# Patient Record
Sex: FEMALE | Race: BLACK OR AFRICAN AMERICAN | NOT HISPANIC OR LATINO | URBAN - METROPOLITAN AREA
[De-identification: names, ages, dates, MRNs, and addresses within clinical notes are randomized per-mention and may not be internally consistent; named-entity substitution may affect disease eponyms.]

---

## 2022-09-22 ENCOUNTER — HOSPITAL ENCOUNTER (INPATIENT)
Facility: HOSPITAL | Age: 37
LOS: 8 days | Discharge: HOME | DRG: 813 | End: 2022-09-30
Attending: EMERGENCY MEDICINE | Admitting: STUDENT IN AN ORGANIZED HEALTH CARE EDUCATION/TRAINING PROGRAM
Payer: MEDICAID

## 2022-09-22 DIAGNOSIS — J18.1 MULTIFOCAL LUNG CONSOLIDATION: ICD-10-CM

## 2022-09-22 DIAGNOSIS — R06.02 SOB (SHORTNESS OF BREATH): ICD-10-CM

## 2022-09-22 DIAGNOSIS — M79.604 LEG PAIN, RIGHT: ICD-10-CM

## 2022-09-22 DIAGNOSIS — M79.604 RIGHT LEG PAIN: ICD-10-CM

## 2022-09-22 DIAGNOSIS — I50.23 ACUTE ON CHRONIC SYSTOLIC CONGESTIVE HEART FAILURE: ICD-10-CM

## 2022-09-22 DIAGNOSIS — R04.2 HEMOPTYSIS: Primary | ICD-10-CM

## 2022-09-22 DIAGNOSIS — Z91.148 NON COMPLIANCE W MEDICATION REGIMEN: ICD-10-CM

## 2022-09-22 DIAGNOSIS — E87.1 HYPONATREMIA: ICD-10-CM

## 2022-09-22 DIAGNOSIS — I50.9 CONGESTIVE HEART FAILURE, UNSPECIFIED HF CHRONICITY, UNSPECIFIED HEART FAILURE TYPE: ICD-10-CM

## 2022-09-22 DIAGNOSIS — F14.10 COCAINE ABUSE: ICD-10-CM

## 2022-09-22 DIAGNOSIS — I50.9 CHF (CONGESTIVE HEART FAILURE): ICD-10-CM

## 2022-09-22 DIAGNOSIS — Z72.0 TOBACCO USE: ICD-10-CM

## 2022-09-22 DIAGNOSIS — D64.9 ANEMIA, UNSPECIFIED TYPE: ICD-10-CM

## 2022-09-22 DIAGNOSIS — R07.9 CHEST PAIN: ICD-10-CM

## 2022-09-22 DIAGNOSIS — I82.4Y1 DEEP VEIN THROMBOSIS (DVT) OF PROXIMAL VEIN OF RIGHT LOWER EXTREMITY, UNSPECIFIED CHRONICITY: ICD-10-CM

## 2022-09-22 PROCEDURE — 99285 EMERGENCY DEPT VISIT HI MDM: CPT

## 2022-09-22 PROCEDURE — 11000001 HC ACUTE MED/SURG PRIVATE ROOM

## 2022-09-22 PROCEDURE — 96375 TX/PRO/DX INJ NEW DRUG ADDON: CPT

## 2022-09-22 PROCEDURE — 96365 THER/PROPH/DIAG IV INF INIT: CPT

## 2022-09-22 PROCEDURE — 99291 CRITICAL CARE FIRST HOUR: CPT

## 2022-09-23 ENCOUNTER — DOCUMENTATION ONLY (OUTPATIENT)
Dept: CARDIOLOGY | Facility: CLINIC | Age: 37
End: 2022-09-23
Payer: MEDICAID

## 2022-09-23 PROBLEM — F14.10 COCAINE ABUSE: Status: ACTIVE | Noted: 2022-09-23

## 2022-09-23 PROBLEM — Z91.148 NON COMPLIANCE W MEDICATION REGIMEN: Status: ACTIVE | Noted: 2022-09-23

## 2022-09-23 PROBLEM — R04.2 HEMOPTYSIS: Status: ACTIVE | Noted: 2022-09-23

## 2022-09-23 PROBLEM — D64.9 ANEMIA: Status: ACTIVE | Noted: 2022-09-23

## 2022-09-23 PROBLEM — Z72.0 TOBACCO USE: Status: ACTIVE | Noted: 2022-09-23

## 2022-09-23 PROBLEM — I82.501 CHRONIC DEEP VEIN THROMBOSIS (DVT) OF RIGHT LOWER EXTREMITY: Status: ACTIVE | Noted: 2022-09-23

## 2022-09-23 PROBLEM — R06.02 SOB (SHORTNESS OF BREATH): Status: ACTIVE | Noted: 2022-09-23

## 2022-09-23 PROBLEM — I50.23 ACUTE ON CHRONIC SYSTOLIC CONGESTIVE HEART FAILURE: Status: ACTIVE | Noted: 2022-09-23

## 2022-09-23 LAB
ABO + RH BLD: NORMAL
ALBUMIN SERPL BCP-MCNC: 2.8 G/DL (ref 3.5–5.2)
ALP SERPL-CCNC: 75 U/L (ref 55–135)
ALT SERPL W/O P-5'-P-CCNC: 17 U/L (ref 10–44)
AMPHET+METHAMPHET UR QL: NEGATIVE
ANION GAP SERPL CALC-SCNC: 13 MMOL/L (ref 8–16)
ANISOCYTOSIS BLD QL SMEAR: SLIGHT
APTT BLDCRRT: 28.3 SEC (ref 21–32)
ASCENDING AORTA: 2.54 CM
AST SERPL-CCNC: 22 U/L (ref 10–40)
AV INDEX (PROSTH): 0.35
AV MEAN GRADIENT: 1 MMHG
AV PEAK GRADIENT: 2 MMHG
AV VALVE AREA: 1.05 CM2
AV VELOCITY RATIO: 0.41
BACTERIA #/AREA URNS HPF: ABNORMAL /HPF
BARBITURATES UR QL SCN>200 NG/ML: NEGATIVE
BASOPHILS # BLD AUTO: 0.04 K/UL (ref 0–0.2)
BASOPHILS NFR BLD: 0.7 % (ref 0–1.9)
BENZODIAZ UR QL SCN>200 NG/ML: NEGATIVE
BILIRUB SERPL-MCNC: 3 MG/DL (ref 0.1–1)
BILIRUB UR QL STRIP: NEGATIVE
BLD GP AB SCN CELLS X3 SERPL QL: NORMAL
BNP SERPL-MCNC: 3610 PG/ML (ref 0–99)
BSA FOR ECHO PROCEDURE: 1.6 M2
BUN SERPL-MCNC: 16 MG/DL (ref 6–20)
BZE UR QL SCN: NEGATIVE
CALCIUM SERPL-MCNC: 8.6 MG/DL (ref 8.7–10.5)
CANNABINOIDS UR QL SCN: NEGATIVE
CHLORIDE SERPL-SCNC: 107 MMOL/L (ref 95–110)
CLARITY UR: CLEAR
CO2 SERPL-SCNC: 18 MMOL/L (ref 23–29)
COLOR UR: YELLOW
CREAT SERPL-MCNC: 0.9 MG/DL (ref 0.5–1.4)
CREAT UR-MCNC: 55.2 MG/DL (ref 15–325)
CV ECHO LV RWT: 0.45 CM
D DIMER PPP IA.FEU-MCNC: 2.65 MG/L FEU
DACRYOCYTES BLD QL SMEAR: ABNORMAL
DIFFERENTIAL METHOD: ABNORMAL
DOP CALC AO PEAK VEL: 0.78 M/S
DOP CALC AO VTI: 10.2 CM
DOP CALC LVOT AREA: 3 CM2
DOP CALC LVOT DIAMETER: 1.95 CM
DOP CALC LVOT PEAK VEL: 0.32 M/S
DOP CALC LVOT STROKE VOLUME: 10.75 CM3
DOP CALCLVOT PEAK VEL VTI: 3.6 CM
E WAVE DECELERATION TIME: 173.52 MSEC
E/A RATIO: 3.52
E/E' RATIO: 18.55 M/S
ECHO LV POSTERIOR WALL: 1.43 CM (ref 0.6–1.1)
EJECTION FRACTION: 15 %
EOSINOPHIL # BLD AUTO: 0 K/UL (ref 0–0.5)
EOSINOPHIL NFR BLD: 0.7 % (ref 0–8)
ERYTHROCYTE [DISTWIDTH] IN BLOOD BY AUTOMATED COUNT: 25.8 % (ref 11.5–14.5)
EST. GFR  (NO RACE VARIABLE): >60 ML/MIN/1.73 M^2
FERRITIN SERPL-MCNC: 95 NG/ML (ref 20–300)
FRACTIONAL SHORTENING: 6 % (ref 28–44)
GLUCOSE SERPL-MCNC: 74 MG/DL (ref 70–110)
GLUCOSE UR QL STRIP: NEGATIVE
HCG INTACT+B SERPL-ACNC: <1.2 MIU/ML
HCT VFR BLD AUTO: 25.8 % (ref 37–48.5)
HGB BLD-MCNC: 7.3 G/DL (ref 12–16)
HGB UR QL STRIP: NEGATIVE
HYALINE CASTS #/AREA URNS LPF: 26 /LPF
HYPOCHROMIA BLD QL SMEAR: ABNORMAL
IMM GRANULOCYTES # BLD AUTO: 0.03 K/UL (ref 0–0.04)
IMM GRANULOCYTES NFR BLD AUTO: 0.5 % (ref 0–0.5)
INR PPP: 1.4 (ref 0.8–1.2)
INTERVENTRICULAR SEPTUM: 1.34 CM (ref 0.6–1.1)
IRON SERPL-MCNC: 34 UG/DL (ref 30–160)
IVC DIAMETER: 2.77 CM
IVRT: 51.38 MSEC
KETONES UR QL STRIP: NEGATIVE
LA MAJOR: 6.36 CM
LA MINOR: 6.44 CM
LA WIDTH: 4.8 CM
LEFT ATRIUM SIZE: 4.46 CM
LEFT ATRIUM VOLUME INDEX: 72.8 ML/M2
LEFT ATRIUM VOLUME: 116.45 CM3
LEFT INTERNAL DIMENSION IN SYSTOLE: 6.05 CM (ref 2.1–4)
LEFT VENTRICLE DIASTOLIC VOLUME INDEX: 131.01 ML/M2
LEFT VENTRICLE DIASTOLIC VOLUME: 209.62 ML
LEFT VENTRICLE MASS INDEX: 266 G/M2
LEFT VENTRICLE SYSTOLIC VOLUME INDEX: 114.6 ML/M2
LEFT VENTRICLE SYSTOLIC VOLUME: 183.41 ML
LEFT VENTRICULAR INTERNAL DIMENSION IN DIASTOLE: 6.41 CM (ref 3.5–6)
LEFT VENTRICULAR MASS: 425.12 G
LEUKOCYTE ESTERASE UR QL STRIP: ABNORMAL
LV LATERAL E/E' RATIO: 14.57 M/S
LV SEPTAL E/E' RATIO: 25.5 M/S
LVOT MG: 0.25 MMHG
LVOT MV: 0.24 CM/S
LYMPHOCYTES # BLD AUTO: 2 K/UL (ref 1–4.8)
LYMPHOCYTES NFR BLD: 34.6 % (ref 18–48)
MAGNESIUM SERPL-MCNC: 1.8 MG/DL (ref 1.6–2.6)
MCH RBC QN AUTO: 23 PG (ref 27–31)
MCHC RBC AUTO-ENTMCNC: 28.3 G/DL (ref 32–36)
MCV RBC AUTO: 81 FL (ref 82–98)
METHADONE UR QL SCN>300 NG/ML: NEGATIVE
MICROSCOPIC COMMENT: ABNORMAL
MONOCYTES # BLD AUTO: 0.4 K/UL (ref 0.3–1)
MONOCYTES NFR BLD: 7.5 % (ref 4–15)
MV PEAK A VEL: 0.29 M/S
MV PEAK E VEL: 1.02 M/S
MV STENOSIS PRESSURE HALF TIME: 50.32 MS
MV VALVE AREA P 1/2 METHOD: 4.37 CM2
NEUTROPHILS # BLD AUTO: 3.3 K/UL (ref 1.8–7.7)
NEUTROPHILS NFR BLD: 56 % (ref 38–73)
NITRITE UR QL STRIP: NEGATIVE
NRBC BLD-RTO: 1 /100 WBC
OPIATES UR QL SCN: NEGATIVE
OVALOCYTES BLD QL SMEAR: ABNORMAL
PCP UR QL SCN>25 NG/ML: NEGATIVE
PH UR STRIP: 6 [PH] (ref 5–8)
PHOSPHATE SERPL-MCNC: 3.5 MG/DL (ref 2.7–4.5)
PISA MRMAX VEL: 4.8 M/S
PISA TR MAX VEL: 4.77 M/S
PLATELET # BLD AUTO: 227 K/UL (ref 150–450)
PLATELET BLD QL SMEAR: ABNORMAL
PMV BLD AUTO: 9.5 FL (ref 9.2–12.9)
POIKILOCYTOSIS BLD QL SMEAR: ABNORMAL
POLYCHROMASIA BLD QL SMEAR: ABNORMAL
POTASSIUM SERPL-SCNC: 4.2 MMOL/L (ref 3.5–5.1)
PROT SERPL-MCNC: 6.6 G/DL (ref 6–8.4)
PROT UR QL STRIP: NEGATIVE
PROTHROMBIN TIME: 15.1 SEC (ref 9–12.5)
RA MAJOR: 6.43 CM
RA PRESSURE: 15 MMHG
RA WIDTH: 5.22 CM
RBC # BLD AUTO: 3.17 M/UL (ref 4–5.4)
RBC #/AREA URNS HPF: 23 /HPF (ref 0–4)
SARS-COV-2 RDRP RESP QL NAA+PROBE: NEGATIVE
SATURATED IRON: 8 % (ref 20–50)
SCHISTOCYTES BLD QL SMEAR: PRESENT
SINUS: 2.66 CM
SODIUM SERPL-SCNC: 138 MMOL/L (ref 136–145)
SP GR UR STRIP: 1.01 (ref 1–1.03)
SQUAMOUS #/AREA URNS HPF: 13 /HPF
STJ: 2.47 CM
TARGETS BLD QL SMEAR: ABNORMAL
TDI LATERAL: 0.07 M/S
TDI SEPTAL: 0.04 M/S
TDI: 0.06 M/S
TOTAL IRON BINDING CAPACITY: 448 UG/DL (ref 250–450)
TOXICOLOGY INFORMATION: NORMAL
TR MAX PG: 91 MMHG
TRANSFERRIN SERPL-MCNC: 303 MG/DL (ref 200–375)
TRICUSPID ANNULAR PLANE SYSTOLIC EXCURSION: 1.1 CM
TROPONIN I SERPL DL<=0.01 NG/ML-MCNC: 0.02 NG/ML (ref 0–0.03)
TV REST PULMONARY ARTERY PRESSURE: 106 MMHG
URN SPEC COLLECT METH UR: ABNORMAL
UROBILINOGEN UR STRIP-ACNC: ABNORMAL EU/DL
WBC # BLD AUTO: 5.87 K/UL (ref 3.9–12.7)
WBC #/AREA URNS HPF: 68 /HPF (ref 0–5)

## 2022-09-23 PROCEDURE — 84702 CHORIONIC GONADOTROPIN TEST: CPT | Performed by: EMERGENCY MEDICINE

## 2022-09-23 PROCEDURE — 85379 FIBRIN DEGRADATION QUANT: CPT | Performed by: EMERGENCY MEDICINE

## 2022-09-23 PROCEDURE — 87088 URINE BACTERIA CULTURE: CPT | Performed by: EMERGENCY MEDICINE

## 2022-09-23 PROCEDURE — 36415 COLL VENOUS BLD VENIPUNCTURE: CPT | Performed by: FAMILY MEDICINE

## 2022-09-23 PROCEDURE — 25000003 PHARM REV CODE 250: Performed by: EMERGENCY MEDICINE

## 2022-09-23 PROCEDURE — 36415 COLL VENOUS BLD VENIPUNCTURE: CPT | Performed by: EMERGENCY MEDICINE

## 2022-09-23 PROCEDURE — 83036 HEMOGLOBIN GLYCOSYLATED A1C: CPT | Performed by: EMERGENCY MEDICINE

## 2022-09-23 PROCEDURE — G0378 HOSPITAL OBSERVATION PER HR: HCPCS

## 2022-09-23 PROCEDURE — 99219 PR INITIAL OBSERVATION CARE,LEVL II: ICD-10-PCS | Mod: ,,, | Performed by: PHYSICIAN ASSISTANT

## 2022-09-23 PROCEDURE — 99219 PR INITIAL OBSERVATION CARE,LEVL II: CPT | Mod: ,,, | Performed by: PHYSICIAN ASSISTANT

## 2022-09-23 PROCEDURE — 63600175 PHARM REV CODE 636 W HCPCS: Performed by: INTERNAL MEDICINE

## 2022-09-23 PROCEDURE — 83735 ASSAY OF MAGNESIUM: CPT | Performed by: EMERGENCY MEDICINE

## 2022-09-23 PROCEDURE — 11000001 HC ACUTE MED/SURG PRIVATE ROOM

## 2022-09-23 PROCEDURE — 93010 ELECTROCARDIOGRAM REPORT: CPT | Mod: ,,, | Performed by: STUDENT IN AN ORGANIZED HEALTH CARE EDUCATION/TRAINING PROGRAM

## 2022-09-23 PROCEDURE — 87086 URINE CULTURE/COLONY COUNT: CPT | Performed by: EMERGENCY MEDICINE

## 2022-09-23 PROCEDURE — 80053 COMPREHEN METABOLIC PANEL: CPT | Performed by: EMERGENCY MEDICINE

## 2022-09-23 PROCEDURE — 85025 COMPLETE CBC W/AUTO DIFF WBC: CPT | Performed by: EMERGENCY MEDICINE

## 2022-09-23 PROCEDURE — 25000003 PHARM REV CODE 250: Performed by: NURSE PRACTITIONER

## 2022-09-23 PROCEDURE — 93010 EKG 12-LEAD: ICD-10-PCS | Mod: ,,, | Performed by: STUDENT IN AN ORGANIZED HEALTH CARE EDUCATION/TRAINING PROGRAM

## 2022-09-23 PROCEDURE — 63600175 PHARM REV CODE 636 W HCPCS: Performed by: EMERGENCY MEDICINE

## 2022-09-23 PROCEDURE — 84484 ASSAY OF TROPONIN QUANT: CPT | Performed by: EMERGENCY MEDICINE

## 2022-09-23 PROCEDURE — 84466 ASSAY OF TRANSFERRIN: CPT | Performed by: FAMILY MEDICINE

## 2022-09-23 PROCEDURE — 85730 THROMBOPLASTIN TIME PARTIAL: CPT | Performed by: EMERGENCY MEDICINE

## 2022-09-23 PROCEDURE — 85610 PROTHROMBIN TIME: CPT | Performed by: EMERGENCY MEDICINE

## 2022-09-23 PROCEDURE — 86850 RBC ANTIBODY SCREEN: CPT | Performed by: EMERGENCY MEDICINE

## 2022-09-23 PROCEDURE — 25500020 PHARM REV CODE 255: Performed by: EMERGENCY MEDICINE

## 2022-09-23 PROCEDURE — U0002 COVID-19 LAB TEST NON-CDC: HCPCS | Performed by: EMERGENCY MEDICINE

## 2022-09-23 PROCEDURE — 80307 DRUG TEST PRSMV CHEM ANLYZR: CPT | Performed by: EMERGENCY MEDICINE

## 2022-09-23 PROCEDURE — 83880 ASSAY OF NATRIURETIC PEPTIDE: CPT | Performed by: EMERGENCY MEDICINE

## 2022-09-23 PROCEDURE — 81000 URINALYSIS NONAUTO W/SCOPE: CPT | Mod: 59 | Performed by: EMERGENCY MEDICINE

## 2022-09-23 PROCEDURE — 82728 ASSAY OF FERRITIN: CPT | Performed by: FAMILY MEDICINE

## 2022-09-23 PROCEDURE — 93005 ELECTROCARDIOGRAM TRACING: CPT

## 2022-09-23 PROCEDURE — 84100 ASSAY OF PHOSPHORUS: CPT | Performed by: EMERGENCY MEDICINE

## 2022-09-23 PROCEDURE — 25000003 PHARM REV CODE 250: Performed by: INTERNAL MEDICINE

## 2022-09-23 RX ORDER — IPRATROPIUM BROMIDE AND ALBUTEROL SULFATE 2.5; .5 MG/3ML; MG/3ML
3 SOLUTION RESPIRATORY (INHALATION) EVERY 4 HOURS PRN
Status: DISCONTINUED | OUTPATIENT
Start: 2022-09-23 | End: 2022-09-30 | Stop reason: HOSPADM

## 2022-09-23 RX ORDER — ASPIRIN 81 MG/1
81 TABLET ORAL DAILY
Status: ON HOLD | COMMUNITY
Start: 2022-08-12 | End: 2022-09-29 | Stop reason: HOSPADM

## 2022-09-23 RX ORDER — BENZONATATE 200 MG/1
CAPSULE ORAL
COMMUNITY
Start: 2022-09-15

## 2022-09-23 RX ORDER — METOPROLOL SUCCINATE 25 MG/1
25 TABLET, EXTENDED RELEASE ORAL DAILY
Status: DISCONTINUED | OUTPATIENT
Start: 2022-09-23 | End: 2022-09-25

## 2022-09-23 RX ORDER — POTASSIUM CHLORIDE 750 MG/1
1 TABLET, EXTENDED RELEASE ORAL DAILY
COMMUNITY
Start: 2022-08-12 | End: 2023-08-12

## 2022-09-23 RX ORDER — METOPROLOL SUCCINATE 25 MG/1
1 TABLET, EXTENDED RELEASE ORAL DAILY
Status: ON HOLD | COMMUNITY
Start: 2022-08-12 | End: 2022-09-29 | Stop reason: HOSPADM

## 2022-09-23 RX ORDER — HYDROCODONE BITARTRATE AND ACETAMINOPHEN 5; 325 MG/1; MG/1
1 TABLET ORAL
Status: COMPLETED | OUTPATIENT
Start: 2022-09-23 | End: 2022-09-23

## 2022-09-23 RX ORDER — ONDANSETRON 2 MG/ML
4 INJECTION INTRAMUSCULAR; INTRAVENOUS EVERY 8 HOURS PRN
Status: DISCONTINUED | OUTPATIENT
Start: 2022-09-23 | End: 2022-09-30 | Stop reason: HOSPADM

## 2022-09-23 RX ORDER — ATORVASTATIN CALCIUM 40 MG/1
40 TABLET, FILM COATED ORAL DAILY
COMMUNITY
Start: 2022-08-02

## 2022-09-23 RX ORDER — GUAIFENESIN 100 MG/5ML
200 SOLUTION ORAL EVERY 4 HOURS PRN
Status: DISCONTINUED | OUTPATIENT
Start: 2022-09-23 | End: 2022-09-30 | Stop reason: HOSPADM

## 2022-09-23 RX ORDER — FUROSEMIDE 10 MG/ML
40 INJECTION INTRAMUSCULAR; INTRAVENOUS
Status: COMPLETED | OUTPATIENT
Start: 2022-09-23 | End: 2022-09-23

## 2022-09-23 RX ORDER — SODIUM CHLORIDE 0.9 % (FLUSH) 0.9 %
10 SYRINGE (ML) INJECTION
Status: DISCONTINUED | OUTPATIENT
Start: 2022-09-23 | End: 2022-09-30 | Stop reason: HOSPADM

## 2022-09-23 RX ORDER — FERROUS SULFATE 324(65)MG
TABLET, DELAYED RELEASE (ENTERIC COATED) ORAL DAILY
Status: ON HOLD | COMMUNITY
Start: 2022-09-15 | End: 2022-09-30 | Stop reason: SDUPTHER

## 2022-09-23 RX ORDER — ACETAMINOPHEN 325 MG/1
650 TABLET ORAL EVERY 6 HOURS PRN
Status: DISCONTINUED | OUTPATIENT
Start: 2022-09-23 | End: 2022-09-30 | Stop reason: HOSPADM

## 2022-09-23 RX ORDER — QUETIAPINE FUMARATE 100 MG/1
100 TABLET, FILM COATED ORAL NIGHTLY
Status: ON HOLD | COMMUNITY
Start: 2022-05-18 | End: 2022-09-29 | Stop reason: SDUPTHER

## 2022-09-23 RX ORDER — MAG HYDROX/ALUMINUM HYD/SIMETH 200-200-20
30 SUSPENSION, ORAL (FINAL DOSE FORM) ORAL EVERY 6 HOURS PRN
Status: DISCONTINUED | OUTPATIENT
Start: 2022-09-23 | End: 2022-09-30 | Stop reason: HOSPADM

## 2022-09-23 RX ORDER — EMPAGLIFLOZIN 10 MG/1
10 TABLET, FILM COATED ORAL DAILY
COMMUNITY
Start: 2022-06-25

## 2022-09-23 RX ORDER — FUROSEMIDE 40 MG/1
40 TABLET ORAL DAILY
COMMUNITY
Start: 2022-08-12

## 2022-09-23 RX ORDER — CARVEDILOL 3.12 MG/1
6.25 TABLET ORAL 2 TIMES DAILY
Status: DISCONTINUED | OUTPATIENT
Start: 2022-09-23 | End: 2022-09-23

## 2022-09-23 RX ORDER — CARVEDILOL 6.25 MG/1
6.25 TABLET ORAL 2 TIMES DAILY
Status: ON HOLD | COMMUNITY
Start: 2022-06-07 | End: 2022-09-29 | Stop reason: HOSPADM

## 2022-09-23 RX ORDER — ATORVASTATIN CALCIUM 40 MG/1
40 TABLET, FILM COATED ORAL DAILY
Status: DISCONTINUED | OUTPATIENT
Start: 2022-09-23 | End: 2022-09-30 | Stop reason: HOSPADM

## 2022-09-23 RX ADMIN — ATORVASTATIN CALCIUM 40 MG: 40 TABLET, FILM COATED ORAL at 08:09

## 2022-09-23 RX ADMIN — FUROSEMIDE 40 MG: 10 INJECTION, SOLUTION INTRAMUSCULAR; INTRAVENOUS at 08:09

## 2022-09-23 RX ADMIN — FUROSEMIDE 40 MG: 10 INJECTION, SOLUTION INTRAMUSCULAR; INTRAVENOUS at 01:09

## 2022-09-23 RX ADMIN — ACETAMINOPHEN 650 MG: 325 TABLET ORAL at 11:09

## 2022-09-23 RX ADMIN — CEFTRIAXONE 1 G: 1 INJECTION, SOLUTION INTRAVENOUS at 05:09

## 2022-09-23 RX ADMIN — METOPROLOL SUCCINATE 25 MG: 25 TABLET, EXTENDED RELEASE ORAL at 11:09

## 2022-09-23 RX ADMIN — ACETAMINOPHEN 650 MG: 325 TABLET ORAL at 10:09

## 2022-09-23 RX ADMIN — HYDROCODONE BITARTRATE AND ACETAMINOPHEN 1 TABLET: 5; 325 TABLET ORAL at 02:09

## 2022-09-23 RX ADMIN — IOHEXOL 100 ML: 350 INJECTION, SOLUTION INTRAVENOUS at 03:09

## 2022-09-23 RX ADMIN — FUROSEMIDE 40 MG: 10 INJECTION, SOLUTION INTRAMUSCULAR; INTRAVENOUS at 10:09

## 2022-09-23 NOTE — HPI
"Ms. Echols is a 37 year old female patient whose current medical conditions include chronic systolic CHF (25% per echo 6/22 in Care Everywhere), history of cocaine abuse, RLE DVT s/p thrombectomy in 6/22, CHALINO, and non-compliance who presented to [facility] ED yesterday with a chief complaint of worsening SOB over the past few days. Associated symptoms included hemoptysis, BLE edema, abdominal bloating, and orthopnea. She denied any associated fever, chills, palpitations, near syncope, or syncope. Initial workup in ED revealed anemia (H/H 7.3/25.8), D-dimer +, and BNP of 3610. CTA negative for PE but did show findings consistent with vascular congestion and right heart strain and patient was subsequently admitted for further evaluation and treatment. Cardiology consulted to assist with management. Patient seen and examined today, resting in bed. Remains SOB. Denies osvaldo chest pain but does admit to some left upper chest "tingling". States she has been having issues with SOB/fluid overload ever since her thrombectomy/hospital admission in June. She claims she has been compliant with her medications, but has been using Lasix prn and taking Eliquis only once daily. Admits to eating 2 bags of potato chips prior to admission. Chart reviewed. Initial troponin negative. Repeat echo pending.    "

## 2022-09-23 NOTE — ASSESSMENT & PLAN NOTE
-Patient with known CHF, EF 25% by last echo in Care Everywhere  -Grossly overloaded on exam  -Continue IV diuresis  -Resume BB if UDS negative  -Will attempt to add ARB vs Entresto pending BP trend  -Repeat Echo pending  -Patient with history of medication non-compliance  -Counseled on dietary and fluid restrictions

## 2022-09-23 NOTE — PLAN OF CARE
Plan of care discussed with pt. Pt verbalized understanding. Free from injury. No s/s of distress noted. Vitals stable. IV SL. Meds as ordered. Pain controlled. Diet tolerated. Tele monitor in place. Q2 hour rounding. No complaints. Will continue to monitor pt. 12 hour chart review.

## 2022-09-23 NOTE — CARE UPDATE
37-year-old female admitted for hemoptysis, upon further investigation patient has blood-tinged sputum.  Iron studies pending.  Since stopping Eliquis this hospitalization she has not had any blood-tinged sputum production.  BB resumed and will further diurese.  Monitor H/ H

## 2022-09-23 NOTE — ASSESSMENT & PLAN NOTE
-s/p thrombectomy few months ago at [facility]  -Has been on Xarelto  -hold due to hemoptysis

## 2022-09-23 NOTE — PROGRESS NOTES
"[facility]) nurse navigator notified of HFTCC candidate in need of education and introduction to 4-6 week program.      PT aao x 3 while lying in bed. Introduced self to pt as HFTCC nurse navigator.     Patient given "Home Care Guide for Heart Failure Patients" , "Heart Failure [facility]" flyer and "Daily weight and symptom tracker".  Encouraged pt to review information.      Reviewed the following key points of HFTCC program with pt and family:   1.) Take your medications as directed.    2.) Weight yourself daily   3.) Follow low salt and limited fluid diet.    4.) Encouraged smoking and drug cessation   5.) Go to your appointments and call your team.      Pt reminded to follow Symptom tracker and to call at the onset of symptoms according to tracker.     Reviewed plan for follow up once discharged to include phone calls, in person and virtual visits to assist pt optimizing their heart failure medication regimen and encouraging healthy lifestyle modifications.  Reminded pt that program will assist them over the next 4-6 weeks and then patient will be transferred to long term care provider .  Reminded pt how to contact HFTCC navigator via phone and or via [employer].     Pt instructed appointment will be printed on hospital discharge paperwork for follow up appt with HENRRY Huff.    Pt also reminded HF nurse will call 48-72 hours after discharge to check on them.      Encouraged pt to read over information often and contact team with any questions or concerns.      "

## 2022-09-23 NOTE — ASSESSMENT & PLAN NOTE
-per Care Everywhere, has been snorting cocaine since age 18  -UDS pending, hold BB until resulted

## 2022-09-23 NOTE — CONSULTS
"[facility]  Cardiology  Consult Note    Patient Name: Teresa Echols  MRN: [id number]  Admission Date: 9/22/2022  Hospital Length of Stay: 0 days  Code Status: Full Code   Attending Provider: Srinivas Beard MD   Consulting Provider: Zoe Kim PA-C  Primary Care Physician: Primary Doctor No  Principal Problem:Hemoptysis    Patient information was obtained from patient, past medical records and ER records.     Inpatient consult to Cardiology  Consult performed by: Zoe Kim PA-C  Consult ordered by: Abel Franklin MD        Subjective:     Chief Complaint:  SOB    HPI:   Ms. Echols is a 37 year old female patient whose current medical conditions include chronic systolic CHF (25% per echo 6/22 in Care Everywhere), history of cocaine abuse, RLE DVT s/p thrombectomy in 6/22, CHALINO, and non-compliance who presented to [facility] ED yesterday with a chief complaint of worsening SOB over the past few days. Associated symptoms included hemoptysis, BLE edema, abdominal bloating, and orthopnea. She denied any associated fever, chills, palpitations, near syncope, or syncope. Initial workup in ED revealed anemia (H/H 7.3/25.8), D-dimer +, and BNP of 3610. CTA negative for PE but did show findings consistent with vascular congestion and right heart strain and patient was subsequently admitted for further evaluation and treatment. Cardiology consulted to assist with management. Patient seen and examined today, resting in bed. Remains SOB. Denies osvaldo chest pain but does admit to some left upper chest "tingling". States she has been having issues with SOB/fluid overload ever since her thrombectomy/hospital admission in June. She claims she has been compliant with her medications, but has been using Lasix prn and taking Eliquis only once daily. Admits to eating 2 bags of potato chips prior to admission. Chart reviewed. Initial troponin negative. Repeat echo pending.        Past Medical History:   Diagnosis Date    Bipolar " disorder, unspecified     CHF (congestive heart failure)     Hypertension        History reviewed. No pertinent surgical history.    Review of patient's allergies indicates:  No Known Allergies    No current facility-administered medications on file prior to encounter.     Current Outpatient Medications on File Prior to Encounter   Medication Sig    metoprolol succinate (TOPROL-XL) 25 MG 24 hr tablet Take 1 tablet by mouth once daily.    potassium chloride SA (K-DUR,KLOR-CON M) 10 MEQ tablet Take 1 tablet by mouth once daily.    apixaban (ELIQUIS) 5 mg Tab Take 5 mg by mouth.    aspirin (ECOTRIN) 81 MG EC tablet Take 81 mg by mouth once daily.    atorvastatin (LIPITOR) 40 MG tablet Take 40 mg by mouth once daily.    benzonatate (TESSALON) 200 MG capsule Take by mouth.    carvediloL (COREG) 6.25 MG tablet Take 6.25 mg by mouth 2 (two) times daily.    ferrous sulfate 324 mg (65 mg iron) TbEC Take by mouth once daily.    furosemide (LASIX) 40 MG tablet Take 40 mg by mouth once daily.    JARDIANCE 10 mg tablet Take 10 mg by mouth once daily.    QUEtiapine (SEROQUEL) 100 MG Tab Take 100 mg by mouth every evening.     Family History       Problem Relation (Age of Onset)    Hypertension Mother, Father          Tobacco Use    Smoking status: Every Day     Types: Cigarettes    Smokeless tobacco: Never   Substance and Sexual Activity    Alcohol use: Yes    Drug use: Yes     Types: Cocaine    Sexual activity: Not on file     Review of Systems   Constitutional: Positive for malaise/fatigue.   HENT: Negative.     Eyes: Negative.    Cardiovascular:  Positive for dyspnea on exertion, leg swelling and orthopnea.   Respiratory:  Positive for shortness of breath.    Endocrine: Negative.    Hematologic/Lymphatic: Negative.    Skin: Negative.    Musculoskeletal: Negative.    Gastrointestinal: Negative.    Genitourinary: Negative.    Neurological: Negative.    Psychiatric/Behavioral: Negative.     Allergic/Immunologic:  Negative.    Objective:     Vital Signs (Most Recent):  Temp: 97.6 °F (36.4 °C) (09/23/22 0748)  Pulse: 107 (09/23/22 0930)  Resp: (!) 22 (09/23/22 0748)  BP: 105/72 (09/23/22 0748)  SpO2: 100 % (09/23/22 0748)   Vital Signs (24h Range):  Temp:  [97.6 °F (36.4 °C)-98.8 °F (37.1 °C)] 97.6 °F (36.4 °C)  Pulse:  [] 107  Resp:  [18-22] 22  SpO2:  [100 %] 100 %  BP: (105-129)/(68-87) 105/72     Weight: 56.7 kg (125 lb)  Body mass index is 21.46 kg/m².    SpO2: 100 %  O2 Device (Oxygen Therapy): nasal cannula      Intake/Output Summary (Last 24 hours) at 9/23/2022 1044  Last data filed at 9/23/2022 0633  Gross per 24 hour   Intake 50 ml   Output --   Net 50 ml       Lines/Drains/Airways       Peripheral Intravenous Line  Duration                  Peripheral IV - Single Lumen 09/23/22 0050 20 G Right Antecubital <1 day                    Physical Exam  Vitals and nursing note reviewed.   Constitutional:       General: She is not in acute distress.     Appearance: Normal appearance. She is well-developed. She is not diaphoretic.   HENT:      Head: Normocephalic and atraumatic.   Eyes:      General:         Right eye: No discharge.         Left eye: No discharge.      Pupils: Pupils are equal, round, and reactive to light.   Neck:      Thyroid: No thyromegaly.      Vascular: No JVD.      Trachea: No tracheal deviation.      Comments: +JVD    Cardiovascular:      Rate and Rhythm: Normal rate and regular rhythm.      Heart sounds: Normal heart sounds, S1 normal and S2 normal. No murmur heard.  Pulmonary:      Effort: Pulmonary effort is normal. No respiratory distress.      Breath sounds: Rales present. No wheezing.   Abdominal:      General: There is distension.      Tenderness: There is no rebound.   Musculoskeletal:      Cervical back: Neck supple.      Right lower leg: Edema present.      Left lower leg: Edema present.   Skin:     General: Skin is warm and dry.      Findings: No erythema.   Neurological:       General: No focal deficit present.      Mental Status: She is alert and oriented to person, place, and time.   Psychiatric:         Mood and Affect: Mood normal.         Behavior: Behavior normal.         Thought Content: Thought content normal.       Significant Labs: CMP   Recent Labs   Lab 09/23/22 0053      K 4.2      CO2 18*   GLU 74   BUN 16   CREATININE 0.9   CALCIUM 8.6*   PROT 6.6   ALBUMIN 2.8*   BILITOT 3.0*   ALKPHOS 75   AST 22   ALT 17   ANIONGAP 13   , CBC   Recent Labs   Lab 09/23/22 0053   WBC 5.87   HGB 7.3*   HCT 25.8*      , INR   Recent Labs   Lab 09/23/22 0053   INR 1.4*   , Troponin   Recent Labs   Lab 09/23/22 0053   TROPONINI 0.024   , and All pertinent lab results from the last 24 hours have been reviewed.    Significant Imaging: Echocardiogram: Transthoracic echo (TTE) complete (Cupid Only):   Results for orders placed or performed during the hospital encounter of 09/22/22   Echo   Result Value Ref Range    BSA 1.6 m2    TDI SEPTAL 0.04 m/s    LV LATERAL E/E' RATIO 14.57 m/s    LV SEPTAL E/E' RATIO 25.50 m/s    LA WIDTH 4.80 cm    IVC diameter 2.77 cm    Left Ventricular Outflow Tract Mean Velocity 0.24 cm/s    Left Ventricular Outflow Tract Mean Gradient 0.25 mmHg    TDI LATERAL 0.07 m/s    LVIDd 6.41 (A) 3.5 - 6.0 cm    IVS 1.34 (A) 0.6 - 1.1 cm    Posterior Wall 1.43 (A) 0.6 - 1.1 cm    LVIDs 6.05 (A) 2.1 - 4.0 cm    FS 6 28 - 44 %    LA volume 116.45 cm3    Sinus 2.66 cm    STJ 2.47 cm    Ascending aorta 2.54 cm    LV mass 425.12 g    LA size 4.46 cm    TAPSE 1.10 cm    Left Ventricle Relative Wall Thickness 0.45 cm    AV mean gradient 1 mmHg    AV valve area 1.05 cm2    AV Velocity Ratio 0.41     AV index (prosthetic) 0.35     MV valve area p 1/2 method 4.37 cm2    E/A ratio 3.52     Mean e' 0.06 m/s    E wave deceleration time 173.52 msec    IVRT 51.38 msec    LVOT diameter 1.95 cm    LVOT area 3.0 cm2    LVOT peak rolly 0.32 m/s    LVOT peak VTI 3.60 cm    Ao  peak marcus 0.78 m/s    Ao VTI 10.2 cm    Mr max marcus 4.80 m/s    LVOT stroke volume 10.75 cm3    AV peak gradient 2 mmHg    E/E' ratio 18.55 m/s    MV Peak E Marcus 1.02 m/s    TR Max Marcus 4.77 m/s    MV stenosis pressure 1/2 time 50.32 ms    MV Peak A Marcus 0.29 m/s    LV Systolic Volume 183.41 mL    LV Systolic Volume Index 114.6 mL/m2    LV Diastolic Volume 209.62 mL    LV Diastolic Volume Index 131.01 mL/m2    LA Volume Index 72.8 mL/m2    LV Mass Index 266 g/m2    RA Major Axis 6.43 cm    Left Atrium Minor Axis 6.44 cm    Left Atrium Major Axis 6.36 cm    Triscuspid Valve Regurgitation Peak Gradient 91 mmHg    RA Width 5.22 cm   , EKG: Reviewed, and X-Ray: CXR: X-Ray Chest 1 View (CXR): No results found for this visit on 09/22/22. and X-Ray Chest PA and Lateral (CXR): No results found for this visit on 09/22/22.    Assessment and Plan:   Patient who presents with decompensated CHF/anemia/hemoptysis. Grossly overloaded on exam, continue IV diuresis. Will optimize medications as tolerated. Repeat echo pending. History of medication non-compliance, she was counseled.     * Hemoptysis  -Likely in setting of fluid overload  -Resume AC as tolerated (patient has Eliquis on home med list, however H and P mentions Xarelto, need clarification)    Anemia  -H/H 7.3/25.8  -Transfuse prn, as per hospital medicine  -Monitor closely when AC resumed    Chronic deep vein thrombosis (DVT) of right lower extremity  -s/p prior thrombectomy  -Resume AC as tolerated    Tobacco use  -Denies recent usage    Non compliance w medication regimen  -Counseled on compliance    Cocaine abuse  -Denies recent usage  -UDS pending    Acute on chronic systolic congestive heart failure  -Patient with known CHF, EF 25% by last echo in Care Everywhere  -Grossly overloaded on exam  -Continue IV diuresis  -Resume BB if UDS negative  -Will attempt to add ARB vs Entresto pending BP trend  -Repeat Echo pending  -Patient with history of medication  non-compliance  -Counseled on dietary and fluid restrictions        VTE Risk Mitigation (From admission, onward)         Ordered     Place sequential compression device  Until discontinued         09/23/22 0539     IP VTE HIGH RISK PATIENT  Once         09/23/22 0539                Thank you for your consult. I will follow-up with patient. Please contact us if you have any additional questions.    Zoe Kim PA-C  Cardiology   [facility]

## 2022-09-23 NOTE — SUBJECTIVE & OBJECTIVE
Past Medical History:   Diagnosis Date    Bipolar disorder, unspecified     CHF (congestive heart failure)     Hypertension        History reviewed. No pertinent surgical history.    Review of patient's allergies indicates:  No Known Allergies    No current facility-administered medications on file prior to encounter.     Current Outpatient Medications on File Prior to Encounter   Medication Sig    metoprolol succinate (TOPROL-XL) 25 MG 24 hr tablet Take 1 tablet by mouth once daily.    potassium chloride SA (K-DUR,KLOR-CON M) 10 MEQ tablet Take 1 tablet by mouth once daily.    apixaban (ELIQUIS) 5 mg Tab Take 5 mg by mouth.    aspirin (ECOTRIN) 81 MG EC tablet Take 81 mg by mouth once daily.    atorvastatin (LIPITOR) 40 MG tablet Take 40 mg by mouth once daily.    benzonatate (TESSALON) 200 MG capsule Take by mouth.    carvediloL (COREG) 6.25 MG tablet Take 6.25 mg by mouth 2 (two) times daily.    ferrous sulfate 324 mg (65 mg iron) TbEC Take by mouth once daily.    furosemide (LASIX) 40 MG tablet Take 40 mg by mouth once daily.    JARDIANCE 10 mg tablet Take 10 mg by mouth once daily.    QUEtiapine (SEROQUEL) 100 MG Tab Take 100 mg by mouth every evening.     Family History       Problem Relation (Age of Onset)    Hypertension Mother, Father          Tobacco Use    Smoking status: Every Day     Types: Cigarettes    Smokeless tobacco: Never   Substance and Sexual Activity    Alcohol use: Yes    Drug use: Yes     Types: Cocaine    Sexual activity: Not on file     Review of Systems   Constitutional: Positive for malaise/fatigue.   HENT: Negative.     Eyes: Negative.    Cardiovascular:  Positive for dyspnea on exertion, leg swelling and orthopnea.   Respiratory:  Positive for shortness of breath.    Endocrine: Negative.    Hematologic/Lymphatic: Negative.    Skin: Negative.    Musculoskeletal: Negative.    Gastrointestinal: Negative.    Genitourinary: Negative.    Neurological: Negative.    Psychiatric/Behavioral:  Negative.     Allergic/Immunologic: Negative.    Objective:     Vital Signs (Most Recent):  Temp: 97.6 °F (36.4 °C) (09/23/22 0748)  Pulse: 107 (09/23/22 0930)  Resp: (!) 22 (09/23/22 0748)  BP: 105/72 (09/23/22 0748)  SpO2: 100 % (09/23/22 0748)   Vital Signs (24h Range):  Temp:  [97.6 °F (36.4 °C)-98.8 °F (37.1 °C)] 97.6 °F (36.4 °C)  Pulse:  [] 107  Resp:  [18-22] 22  SpO2:  [100 %] 100 %  BP: (105-129)/(68-87) 105/72     Weight: 56.7 kg (125 lb)  Body mass index is 21.46 kg/m².    SpO2: 100 %  O2 Device (Oxygen Therapy): nasal cannula      Intake/Output Summary (Last 24 hours) at 9/23/2022 1044  Last data filed at 9/23/2022 0633  Gross per 24 hour   Intake 50 ml   Output --   Net 50 ml       Lines/Drains/Airways       Peripheral Intravenous Line  Duration                  Peripheral IV - Single Lumen 09/23/22 0050 20 G Right Antecubital <1 day                    Physical Exam  Vitals and nursing note reviewed.   Constitutional:       General: She is not in acute distress.     Appearance: Normal appearance. She is well-developed. She is not diaphoretic.   HENT:      Head: Normocephalic and atraumatic.   Eyes:      General:         Right eye: No discharge.         Left eye: No discharge.      Pupils: Pupils are equal, round, and reactive to light.   Neck:      Thyroid: No thyromegaly.      Vascular: No JVD.      Trachea: No tracheal deviation.      Comments: +JVD    Cardiovascular:      Rate and Rhythm: Normal rate and regular rhythm.      Heart sounds: Normal heart sounds, S1 normal and S2 normal. No murmur heard.  Pulmonary:      Effort: Pulmonary effort is normal. No respiratory distress.      Breath sounds: Rales present. No wheezing.   Abdominal:      General: There is distension.      Tenderness: There is no rebound.   Musculoskeletal:      Cervical back: Neck supple.      Right lower leg: Edema present.      Left lower leg: Edema present.   Skin:     General: Skin is warm and dry.      Findings: No  erythema.   Neurological:      General: No focal deficit present.      Mental Status: She is alert and oriented to person, place, and time.   Psychiatric:         Mood and Affect: Mood normal.         Behavior: Behavior normal.         Thought Content: Thought content normal.       Significant Labs: CMP   Recent Labs   Lab 09/23/22 0053      K 4.2      CO2 18*   GLU 74   BUN 16   CREATININE 0.9   CALCIUM 8.6*   PROT 6.6   ALBUMIN 2.8*   BILITOT 3.0*   ALKPHOS 75   AST 22   ALT 17   ANIONGAP 13   , CBC   Recent Labs   Lab 09/23/22  0053   WBC 5.87   HGB 7.3*   HCT 25.8*      , INR   Recent Labs   Lab 09/23/22 0053   INR 1.4*   , Troponin   Recent Labs   Lab 09/23/22 0053   TROPONINI 0.024   , and All pertinent lab results from the last 24 hours have been reviewed.    Significant Imaging: Echocardiogram: Transthoracic echo (TTE) complete (Cupid Only):   Results for orders placed or performed during the hospital encounter of 09/22/22   Echo   Result Value Ref Range    BSA 1.6 m2    TDI SEPTAL 0.04 m/s    LV LATERAL E/E' RATIO 14.57 m/s    LV SEPTAL E/E' RATIO 25.50 m/s    LA WIDTH 4.80 cm    IVC diameter 2.77 cm    Left Ventricular Outflow Tract Mean Velocity 0.24 cm/s    Left Ventricular Outflow Tract Mean Gradient 0.25 mmHg    TDI LATERAL 0.07 m/s    LVIDd 6.41 (A) 3.5 - 6.0 cm    IVS 1.34 (A) 0.6 - 1.1 cm    Posterior Wall 1.43 (A) 0.6 - 1.1 cm    LVIDs 6.05 (A) 2.1 - 4.0 cm    FS 6 28 - 44 %    LA volume 116.45 cm3    Sinus 2.66 cm    STJ 2.47 cm    Ascending aorta 2.54 cm    LV mass 425.12 g    LA size 4.46 cm    TAPSE 1.10 cm    Left Ventricle Relative Wall Thickness 0.45 cm    AV mean gradient 1 mmHg    AV valve area 1.05 cm2    AV Velocity Ratio 0.41     AV index (prosthetic) 0.35     MV valve area p 1/2 method 4.37 cm2    E/A ratio 3.52     Mean e' 0.06 m/s    E wave deceleration time 173.52 msec    IVRT 51.38 msec    LVOT diameter 1.95 cm    LVOT area 3.0 cm2    LVOT peak rolly 0.32 m/s     LVOT peak VTI 3.60 cm    Ao peak marcus 0.78 m/s    Ao VTI 10.2 cm    Mr max marcus 4.80 m/s    LVOT stroke volume 10.75 cm3    AV peak gradient 2 mmHg    E/E' ratio 18.55 m/s    MV Peak E Marcus 1.02 m/s    TR Max Marcus 4.77 m/s    MV stenosis pressure 1/2 time 50.32 ms    MV Peak A Marcus 0.29 m/s    LV Systolic Volume 183.41 mL    LV Systolic Volume Index 114.6 mL/m2    LV Diastolic Volume 209.62 mL    LV Diastolic Volume Index 131.01 mL/m2    LA Volume Index 72.8 mL/m2    LV Mass Index 266 g/m2    RA Major Axis 6.43 cm    Left Atrium Minor Axis 6.44 cm    Left Atrium Major Axis 6.36 cm    Triscuspid Valve Regurgitation Peak Gradient 91 mmHg    RA Width 5.22 cm   , EKG: Reviewed, and X-Ray: CXR: X-Ray Chest 1 View (CXR): No results found for this visit on 09/22/22. and X-Ray Chest PA and Lateral (CXR): No results found for this visit on 09/22/22.

## 2022-09-23 NOTE — ASSESSMENT & PLAN NOTE
-Likely in setting of fluid overload  -Resume AC as tolerated (patient has Eliquis on home med list, however H and P mentions Xarelto, need clarification)

## 2022-09-23 NOTE — HPI
Ms. Echols is a 37-year-old  female with PMH significant for systolic CHF (EF 25% on echo done June 2022), chronic cocaine user, right lower extremity DVT status post thrombectomy on in June), presented to [facility] ED complaining of coughing up blood the past 2-3 months, since the thrombectomy procedure.  Patient on Xarelto.  States that she has been to multiple hospitals for the same issue, was told that there is nothing abnormal and was sent home.  Denies chest pain, but complains of SOB, worsen with exertion.  Denies fever, chills.  /68.  Afebrile.  HR .  SaO2 100% on 2 L O2 N/C (not home oxygen dependent).  Laboratory workup reveals hemoglobin 7.3, MCV 81, INR 1.4.  D-dimer elevated 2.65.  CTA chest negative for PE, but reveals enlarged cardiac silhouette, right heart strain, increased vascular congestion.  BNP elevated 3610.  Troponin 0.024.  UDS pending.  Received Lasix 40 mg IV x1 in the ED.    Admitting diagnosis:  Hemoptysis.  Acute on chronic systolic CHF.  Chronic cocaine user.  History of right leg DVT, status post thrombectomy in June 2022.  On Xarelto.   25-Mar-2020 11:28

## 2022-09-23 NOTE — PLAN OF CARE
[facility] - Med Surg  Initial Discharge Assessment       Primary Care Provider: Primary Doctor No    Admission Diagnosis: CHF (congestive heart failure) [I50.9]  SOB (shortness of breath) [R06.02]  Right leg pain [M79.604]  Leg pain, right [M79.604]  Multifocal lung consolidation [J18.1]  Hemoptysis [R04.2]  Anemia, unspecified type [D64.9]  Congestive heart failure, unspecified HF chronicity, unspecified heart failure type [I50.9]    Admission Date: 9/22/2022  Expected Discharge Date:     Discharge Barriers Identified: None    Payor: MEDICAID / Plan: [place] [place] / Product Type: Managed Medicaid /     Extended Emergency Contact Information  Primary Emergency Contact: Yee Echols   [facility]  Home Phone: [phone]  Mobile Phone: [phone]  Relation: Mother    Discharge Plan A: Home with family       No Pharmacies Listed    Initial Assessment (most recent)       Adult Discharge Assessment - 09/23/22 1223          Discharge Assessment    Assessment Type Discharge Planning Assessment     Confirmed/corrected address, phone number and insurance Yes     Confirmed Demographics Correct on Facesheet     Source of Information patient     Communicated PEPITO with patient/caregiver Date not available/Unable to determine     Reason For Admission Hemoptysis     Lives With other relative(s)     Facility Arrived From: home     Do you expect to return to your current living situation? Yes     Do you have help at home or someone to help you manage your care at home? Yes     Who are your caregiver(s) and their phone number(s)? Yee Echols (Mother)     Prior to hospitilization cognitive status: Alert/Oriented     Current cognitive status: Alert/Oriented     Walking or Climbing Stairs Difficulty none     Dressing/Bathing Difficulty none     Home Accessibility wheelchair accessible     Home Layout Able to live on 1st floor     Equipment Currently Used at Home none     Readmission within 30 days? No      Patient currently being followed by outpatient case management? No     Do you currently have service(s) that help you manage your care at home? No     Do you take prescription medications? Yes     Do you have prescription coverage? Yes     Coverage Medicaid     Do you have any problems affording any of your prescribed medications? No     Is the patient taking medications as prescribed? yes     Who is going to help you get home at discharge? Yee Echols (Mother)     How do you get to doctors appointments? family or friend will provide;health plan transportation     Are you on dialysis? No     Do you take coumadin? No     Discharge Plan A Home with family     DME Needed Upon Discharge  none     Discharge Plan discussed with: Patient     Discharge Barriers Identified None                   Anticipated DC dispo: home with family   Prior Level of Function: independent, lives with Aunt   PCP:  Dr. Juan Contreras MD    Comments:  CM met with patient at bedside to introduce role and discuss discharge planning. Patient lives with her aunt who will also be help at home and can provide transport at time of discharge. CM discharge needs depends on hospital progress. CM will continue following to assist with other needs.

## 2022-09-23 NOTE — CONSULTS
For education on fluid and salt restriction    Time Spent: 5 minutes    Learners: Pt     Nutrition Education provided with handouts:  Low-Sodium Nutrition Therapy and Fluid-Restricted Diet ([email or web address].[email or web address])     Comments:  RD educated patient on low sodium diet and fluid restriction related to hospital diagnosis. Discussed the importance of limiting sodium to 2,000 mg per day and reading food labels to avoid further complications of dx. Discussed using salt free seasonings and other herbs and spices in meals to enhance flavor without additional sodium. Discussed 1500 ml fluid restriction per MD and dietary sources of fluid. RD recommended using a cup with measurements for fluids and to try to consume small sips spread throughout the day rather than a lot at one time.    All questions and concerns answered.    Provided handout with dietitian's contact information.    *Please re-consult as needed.    Thanks!  See [facility]

## 2022-09-23 NOTE — ED PROVIDER NOTES
SCRIBE #1 NOTE: I, Archie Kc, am scribing for, and in the presence of, Natacha Chicas MD. I have scribed the entire note.      History      Chief Complaint   Patient presents with    Right leg pain     Pt C/O of 10/10 right leg pain. No trauma noted, Hx DVT. Pt has chronic SOB, and she has been coughing.        Review of patient's allergies indicates:  No Known Allergies     HPI   HPI    9/23/2022, 12:18 AM   History obtained from the patient      History of Present Illness: Teresa Echols is a 37 y.o. female patient with a PMHx of CHF, DVT who presents to the Emergency Department for SOB, onset 4 months PTA. Symptoms are constant and moderate in severity. Sxs are worse with exertion. Associated sxs include bright red hemoptysis, generalized abdominal pain, and abdominal distention. Pt also reports chronic RLE pain following a RLE thrombectomy on 6/9/22. Patient denies any fever, chills, n/v/d, CP, weakness, numbness, dizziness, headache, and all other sxs at this time. Pt is currently on Eliquis, and states that she is compliant with her home medication regimen. No further complaints or concerns at this time.     Arrival mode: EMS    PCP: Juan Contreras MD       Past Medical History:  Past Medical History:   Diagnosis Date    Bipolar disorder, unspecified     CHF (congestive heart failure)     Hypertension        Past Surgical History:  History reviewed. No pertinent surgical history.      Family History:  Family History   Problem Relation Age of Onset    Hypertension Mother     Hypertension Father        Social History:  Social History     Tobacco Use    Smoking status: Every Day     Types: Cigarettes    Smokeless tobacco: Never   Substance and Sexual Activity    Alcohol use: Yes    Drug use: Yes     Types: Cocaine    Sexual activity: Not on file       ROS   Review of Systems   Constitutional:  Negative for chills and fever.   HENT:  Negative for sore throat.    Respiratory:  Positive for cough (bright red  hemoptysis) and shortness of breath.    Cardiovascular:  Negative for chest pain.   Gastrointestinal:  Positive for abdominal distention and abdominal pain (generalized). Negative for diarrhea, nausea and vomiting.   Genitourinary:  Negative for dysuria.   Musculoskeletal:  Positive for myalgias (RLE, chronic). Negative for back pain.   Skin:  Negative for rash.   Neurological:  Negative for dizziness, weakness, light-headedness, numbness and headaches.   Hematological:  Does not bruise/bleed easily.   All other systems reviewed and are negative.    Physical Exam      Initial Vitals [09/22/22 2340]   BP Pulse Resp Temp SpO2   122/68 76 20 98.8 °F (37.1 °C) 100 %      MAP       --          Physical Exam  Nursing Notes and Vital Signs Reviewed.  Constitutional: Patient is in no acute distress. Well-developed and well-nourished.  Head: Atraumatic. Normocephalic.  Eyes: PERRL. EOM intact. Conjunctivae are not pale. No scleral icterus.  ENT: Mucous membranes are moist. Oropharynx is clear and symmetric.    Neck: Supple. Full ROM. No lymphadenopathy. JVD.  Cardiovascular: Regular rate. Regular rhythm. No murmurs, rubs, or gallops. Distal pulses are 2+ and symmetric.  Pulmonary/Chest: No respiratory distress. Clear to auscultation bilaterally. No wheezing or rales.  Abdominal: Distended. There is diffuse tenderness. Hepatomegaly.   Musculoskeletal: Moves all extremities. No obvious deformities. 2+ bilateral pedal edema.  Skin: Warm and dry.  Neurological:  Alert, awake, and appropriate.  Normal speech.  No acute focal neurological deficits are appreciated.  Psychiatric: Normal affect. Good eye contact. Appropriate in content.    ED Course    Critical Care    Date/Time: 9/23/2022 5:43 AM  Performed by: Natacha Chicas MD  Authorized by: Natacha Chicas MD   Direct patient critical care time: 15 minutes  Additional history critical care time: 5 minutes  Ordering / reviewing critical care time: 10 minutes  Documentation critical  "care time: 5 minutes  Consulting other physicians critical care time: 5 minutes  Total critical care time (exclusive of procedural time) : 40 minutes  Critical care time was exclusive of separately billable procedures and treating other patients and teaching time.  Critical care was necessary to treat or prevent imminent or life-threatening deterioration of the following conditions: CHF, hemoptysis.  Critical care was time spent personally by me on the following activities: blood draw for specimens, development of treatment plan with patient or surrogate, discussions with consultants, interpretation of cardiac output measurements, evaluation of patient's response to treatment, examination of patient, obtaining history from patient or surrogate, ordering and performing treatments and interventions, ordering and review of laboratory studies, ordering and review of radiographic studies, pulse oximetry, re-evaluation of patient's condition and review of old charts.      ED Vital Signs:  Vitals:    09/23/22 0632 09/23/22 0634 09/23/22 0748 09/23/22 0930   BP: 105/80 105/80 105/72    Pulse: 106  104 107   Resp: (!) 22  (!) 22    Temp:   97.6 °F (36.4 °C)    TempSrc:   Oral    SpO2: 100%  100%    Weight:  56.7 kg (125 lb)     Height:  5' 4" (1.626 m)      09/23/22 1100 09/23/22 1120 09/23/22 1323 09/23/22 1644   BP:  98/77  95/66   Pulse: 105 104 90 97   Resp:  20  16   Temp:  97.4 °F (36.3 °C)  97.9 °F (36.6 °C)   TempSrc:  Axillary  Oral   SpO2:  100%  98%   Weight:       Height:        09/23/22 1715 09/23/22 1901 09/24/22 0134 09/24/22 0508   BP:   102/65 99/73   Pulse: 92 94 98 94   Resp:  18 16 16   Temp:   97.2 °F (36.2 °C) 97.6 °F (36.4 °C)   TempSrc:   Oral Oral   SpO2:   100% (!) 92%   Weight:       Height:        09/24/22 0802 09/24/22 1136 09/24/22 1614   BP: 98/63 92/71 103/67   Pulse: 93 92 89   Resp: 18 19 16   Temp: 97.5 °F (36.4 °C) 98.2 °F (36.8 °C) 98.5 °F (36.9 °C)   TempSrc: Oral Oral Oral   SpO2: 99% " 100% 95%   Weight:      Height:          Abnormal Lab Results:  Labs Reviewed   CBC W/ AUTO DIFFERENTIAL - Abnormal; Notable for the following components:       Result Value    RBC 3.17 (*)     Hemoglobin 7.3 (*)     Hematocrit 25.8 (*)     MCV 81 (*)     MCH 23.0 (*)     MCHC 28.3 (*)     RDW 25.8 (*)     nRBC 1 (*)     All other components within normal limits    Narrative:     Release to patient->Immediate   COMPREHENSIVE METABOLIC PANEL - Abnormal; Notable for the following components:    CO2 18 (*)     Calcium 8.6 (*)     Albumin 2.8 (*)     Total Bilirubin 3.0 (*)     All other components within normal limits    Narrative:     Release to patient->Immediate   PROTIME-INR - Abnormal; Notable for the following components:    Prothrombin Time 15.1 (*)     INR 1.4 (*)     All other components within normal limits    Narrative:     Release to patient->Immediate   B-TYPE NATRIURETIC PEPTIDE - Abnormal; Notable for the following components:    BNP 3,610 (*)     All other components within normal limits    Narrative:     Release to patient->Immediate   URINALYSIS, REFLEX TO URINE CULTURE - Abnormal; Notable for the following components:    Urobilinogen, UA 2.0-3.0 (*)     Leukocytes, UA 3+ (*)     All other components within normal limits    Narrative:     Specimen Source->Urine   D DIMER, QUANTITATIVE - Abnormal; Notable for the following components:    D-Dimer 2.65 (*)     All other components within normal limits    Narrative:     Release to patient->Immediate   URINALYSIS MICROSCOPIC - Abnormal; Notable for the following components:    RBC, UA 23 (*)     WBC, UA 68 (*)     Hyaline Casts, UA 26 (*)     All other components within normal limits    Narrative:     Specimen Source->Urine   CULTURE, URINE   APTT    Narrative:     Release to patient->Immediate   HCG, QUANTITATIVE    Narrative:     Release to patient->Immediate   MAGNESIUM    Narrative:     Release to patient->Immediate   PHOSPHORUS    Narrative:      Release to patient->Immediate   SARS-COV-2 RNA AMPLIFICATION, QUAL   TROPONIN I    Narrative:     Release to patient->Immediate   HEMOGLOBIN A1C   DRUG SCREEN PANEL, URINE EMERGENCY   TYPE & SCREEN        All Lab Results:  Results for orders placed or performed during the hospital encounter of 09/22/22   CBC auto differential   Result Value Ref Range    WBC 5.87 3.90 - 12.70 K/uL    RBC 3.17 (L) 4.00 - 5.40 M/uL    Hemoglobin 7.3 (L) 12.0 - 16.0 g/dL    Hematocrit 25.8 (L) 37.0 - 48.5 %    MCV 81 (L) 82 - 98 fL    MCH 23.0 (L) 27.0 - 31.0 pg    MCHC 28.3 (L) 32.0 - 36.0 g/dL    RDW 25.8 (H) 11.5 - 14.5 %    Platelets 227 150 - 450 K/uL    MPV 9.5 9.2 - 12.9 fL    Immature Granulocytes 0.5 0.0 - 0.5 %    Gran # (ANC) 3.3 1.8 - 7.7 K/uL    Immature Grans (Abs) 0.03 0.00 - 0.04 K/uL    Lymph # 2.0 1.0 - 4.8 K/uL    Mono # 0.4 0.3 - 1.0 K/uL    Eos # 0.0 0.0 - 0.5 K/uL    Baso # 0.04 0.00 - 0.20 K/uL    nRBC 1 (A) 0 /100 WBC    Gran % 56.0 38.0 - 73.0 %    Lymph % 34.6 18.0 - 48.0 %    Mono % 7.5 4.0 - 15.0 %    Eosinophil % 0.7 0.0 - 8.0 %    Basophil % 0.7 0.0 - 1.9 %    Platelet Estimate Appears normal     Aniso Slight     Poik Moderate     Poly Occasional     Hypo Occasional     Ovalocytes Occasional     Target Cells Occasional     Tear Drop Cells Moderate     Schistocytes Present     Differential Method Automated    Comprehensive metabolic panel   Result Value Ref Range    Sodium 138 136 - 145 mmol/L    Potassium 4.2 3.5 - 5.1 mmol/L    Chloride 107 95 - 110 mmol/L    CO2 18 (L) 23 - 29 mmol/L    Glucose 74 70 - 110 mg/dL    BUN 16 6 - 20 mg/dL    Creatinine 0.9 0.5 - 1.4 mg/dL    Calcium 8.6 (L) 8.7 - 10.5 mg/dL    Total Protein 6.6 6.0 - 8.4 g/dL    Albumin 2.8 (L) 3.5 - 5.2 g/dL    Total Bilirubin 3.0 (H) 0.1 - 1.0 mg/dL    Alkaline Phosphatase 75 55 - 135 U/L    AST 22 10 - 40 U/L    ALT 17 10 - 44 U/L    Anion Gap 13 8 - 16 mmol/L    eGFR >60 >60 mL/min/1.73 m^2   Protime-INR   Result Value Ref Range     Prothrombin Time 15.1 (H) 9.0 - 12.5 sec    INR 1.4 (H) 0.8 - 1.2   APTT   Result Value Ref Range    aPTT 28.3 21.0 - 32.0 sec   Brain natriuretic peptide   Result Value Ref Range    BNP 3,610 (H) 0 - 99 pg/mL   hCG, quantitative, pregnancy   Result Value Ref Range    HCG Quant <1.2 See Text mIU/mL   Magnesium   Result Value Ref Range    Magnesium 1.8 1.6 - 2.6 mg/dL   Phosphorus   Result Value Ref Range    Phosphorus 3.5 2.7 - 4.5 mg/dL   COVID-19 Rapid Screening   Result Value Ref Range    SARS-CoV-2 RNA, Amplification, Qual Negative Negative   Troponin I   Result Value Ref Range    Troponin I 0.024 0.000 - 0.026 ng/mL   Urinalysis, Reflex to Urine Culture Urine, Clean Catch    Specimen: Urine   Result Value Ref Range    Specimen UA Urine, Clean Catch     Color, UA Yellow Yellow, Straw, Ninfa    Appearance, UA Clear Clear    pH, UA 6.0 5.0 - 8.0    Specific Gravity, UA 1.010 1.005 - 1.030    Protein, UA Negative Negative    Glucose, UA Negative Negative    Ketones, UA Negative Negative    Bilirubin (UA) Negative Negative    Occult Blood UA Negative Negative    Nitrite, UA Negative Negative    Urobilinogen, UA 2.0-3.0 (A) <2.0 EU/dL    Leukocytes, UA 3+ (A) Negative   D dimer, quantitative   Result Value Ref Range    D-Dimer 2.65 (H) <0.50 mg/L FEU   Urinalysis Microscopic   Result Value Ref Range    RBC, UA 23 (H) 0 - 4 /hpf    WBC, UA 68 (H) 0 - 5 /hpf    Bacteria Rare None-Occ /hpf    Squam Epithel, UA 13 /hpf    Hyaline Casts, UA 26 (A) 0-1/lpf /lpf    Microscopic Comment SEE COMMENT    Drug screen panel, in-house   Result Value Ref Range    Benzodiazepines Negative Negative    Methadone metabolites Negative Negative    Cocaine (Metab.) Negative Negative    Opiate Scrn, Ur Negative Negative    Barbiturate Screen, Ur Negative Negative    Amphetamine Screen, Ur Negative Negative    THC Negative Negative    Phencyclidine Negative Negative    Creatinine, Urine 55.2 15.0 - 325.0 mg/dL    Toxicology Information SEE  COMMENT    Hemoglobin A1C   Result Value Ref Range    Hemoglobin A1C 5.0 4.0 - 5.6 %    Estimated Avg Glucose 97 68 - 131 mg/dL   Iron and TIBC   Result Value Ref Range    Iron 34 30 - 160 ug/dL    Transferrin 303 200 - 375 mg/dL    TIBC 448 250 - 450 ug/dL    Saturated Iron 8 (L) 20 - 50 %   Ferritin   Result Value Ref Range    Ferritin 95 20.0 - 300.0 ng/mL   CBC Auto Differential   Result Value Ref Range    WBC 5.50 3.90 - 12.70 K/uL    RBC 3.35 (L) 4.00 - 5.40 M/uL    Hemoglobin 7.8 (L) 12.0 - 16.0 g/dL    Hematocrit 26.8 (L) 37.0 - 48.5 %    MCV 80 (L) 82 - 98 fL    MCH 23.3 (L) 27.0 - 31.0 pg    MCHC 29.1 (L) 32.0 - 36.0 g/dL    RDW 25.2 (H) 11.5 - 14.5 %    Platelets 237 150 - 450 K/uL    MPV 9.8 9.2 - 12.9 fL    Immature Granulocytes 0.4 0.0 - 0.5 %    Gran # (ANC) 2.6 1.8 - 7.7 K/uL    Immature Grans (Abs) 0.02 0.00 - 0.04 K/uL    Lymph # 2.3 1.0 - 4.8 K/uL    Mono # 0.4 0.3 - 1.0 K/uL    Eos # 0.1 0.0 - 0.5 K/uL    Baso # 0.04 0.00 - 0.20 K/uL    nRBC 0 0 /100 WBC    Gran % 47.6 38.0 - 73.0 %    Lymph % 42.2 18.0 - 48.0 %    Mono % 6.7 4.0 - 15.0 %    Eosinophil % 2.4 0.0 - 8.0 %    Basophil % 0.7 0.0 - 1.9 %    Platelet Estimate Appears normal     Aniso Moderate     Poik Moderate     Poly Occasional     Hypo Occasional     Ovalocytes Occasional     Target Cells Occasional     Tear Drop Cells Occasional     [place] Cells Occasional     Schistocytes Present     Differential Method Automated    Magnesium   Result Value Ref Range    Magnesium 1.7 1.6 - 2.6 mg/dL   Comprehensive Metabolic Panel   Result Value Ref Range    Sodium 134 (L) 136 - 145 mmol/L    Potassium 3.9 3.5 - 5.1 mmol/L    Chloride 102 95 - 110 mmol/L    CO2 18 (L) 23 - 29 mmol/L    Glucose 91 70 - 110 mg/dL    BUN 22 (H) 6 - 20 mg/dL    Creatinine 1.0 0.5 - 1.4 mg/dL    Calcium 8.4 (L) 8.7 - 10.5 mg/dL    Total Protein 5.7 (L) 6.0 - 8.4 g/dL    Albumin 2.5 (L) 3.5 - 5.2 g/dL    Total Bilirubin 2.7 (H) 0.1 - 1.0 mg/dL    Alkaline Phosphatase 79  55 - 135 U/L    AST 19 10 - 40 U/L    ALT 17 10 - 44 U/L    Anion Gap 14 8 - 16 mmol/L    eGFR >60 >60 mL/min/1.73 m^2   Echo   Result Value Ref Range    BSA 1.6 m2    TDI SEPTAL 0.04 m/s    LV LATERAL E/E' RATIO 14.57 m/s    LV SEPTAL E/E' RATIO 25.50 m/s    LA WIDTH 4.80 cm    IVC diameter 2.77 cm    Left Ventricular Outflow Tract Mean Velocity 0.24 cm/s    Left Ventricular Outflow Tract Mean Gradient 0.25 mmHg    TDI LATERAL 0.07 m/s    LVIDd 6.41 (A) 3.5 - 6.0 cm    IVS 1.34 (A) 0.6 - 1.1 cm    Posterior Wall 1.43 (A) 0.6 - 1.1 cm    LVIDs 6.05 (A) 2.1 - 4.0 cm    FS 6 28 - 44 %    LA volume 116.45 cm3    Sinus 2.66 cm    STJ 2.47 cm    Ascending aorta 2.54 cm    LV mass 425.12 g    LA size 4.46 cm    TAPSE 1.10 cm    Left Ventricle Relative Wall Thickness 0.45 cm    AV mean gradient 1 mmHg    AV valve area 1.05 cm2    AV Velocity Ratio 0.41     AV index (prosthetic) 0.35     MV valve area p 1/2 method 4.37 cm2    E/A ratio 3.52     Mean e' 0.06 m/s    E wave deceleration time 173.52 msec    IVRT 51.38 msec    LVOT diameter 1.95 cm    LVOT area 3.0 cm2    LVOT peak marcus 0.32 m/s    LVOT peak VTI 3.60 cm    Ao peak marcus 0.78 m/s    Ao VTI 10.2 cm    Mr max marcus 4.80 m/s    LVOT stroke volume 10.75 cm3    AV peak gradient 2 mmHg    E/E' ratio 18.55 m/s    MV Peak E Marcus 1.02 m/s    TR Max Marcus 4.77 m/s    MV stenosis pressure 1/2 time 50.32 ms    MV Peak A Marcus 0.29 m/s    LV Systolic Volume 183.41 mL    LV Systolic Volume Index 114.6 mL/m2    LV Diastolic Volume 209.62 mL    LV Diastolic Volume Index 131.01 mL/m2    LA Volume Index 72.8 mL/m2    LV Mass Index 266 g/m2    RA Major Axis 6.43 cm    Left Atrium Minor Axis 6.44 cm    Left Atrium Major Axis 6.36 cm    Triscuspid Valve Regurgitation Peak Gradient 91 mmHg    RA Width 5.22 cm    Right Atrial Pressure (from IVC) 15 mmHg    EF 15 %    TV rest pulmonary artery pressure 106 mmHg   Type & Screen   Result Value Ref Range    Group & Rh O POS     Indirect Marlene NEG       Imaging Results:  Imaging Results              US Lower Extremity Veins Right (Final result)  Result time 09/23/22 07:33:26      Final result by Harsh Ng Jr., MD (09/23/22 07:33:26)                   Impression:      No evidence of deep venous thrombosis within the right lower extremity.      Electronically signed by: Harsh Ng Jr., MD  Date:    09/23/2022  Time:    07:33               Narrative:    EXAMINATION:  US LOWER EXTREMITY VEINS RIGHT    CLINICAL HISTORY:  Pain in right leg    TECHNIQUE:  Duplex and color flow Doppler evaluation and graded compression of the right lower extremity veins was performed.    COMPARISON:  None    FINDINGS:  Right thigh veins: The common femoral, femoral, popliteal, upper greater saphenous, and deep femoral veins are patent and free of thrombus. The veins are normally compressible and have normal phasic flow and augmentation response.    Right calf veins: The visualized calf veins are patent.    Contralateral CFV: The contralateral (left) common femoral vein is patent and free of thrombus.    Miscellaneous: High pulsatility waveforms throughout the lower extremity as may occur with cardiac dysfunction.                                       CTA Chest Non-Coronary (PE Studies) (Final result)  Result time 09/23/22 06:53:15      Final result by Clarence Alicea MD (09/23/22 06:53:15)                   Impression:      No evidence of pulmonary embolism.  NO EVIDENCE OF RIGHT HEART STRAIN.  Reflux into the IVC and dilated on the right atrium suggest right heart dysfunction.  Recommend cardiac echo follow-up.    Cardiomegaly.  Bilateral lower lobe consolidative processes concerning for edema versus multifocal airspace disease or aspiration. Septal thickening seen within the lungs bilaterally reflecting interstitial edema or interstitial pneumonia.    See additional findings above    All CT scans at this facility use dose modulation, iterative reconstruction and/or weight  based dosing when appropriate to reduce radiation dose to as low as reasonably achievable.      Electronically signed by: Clarence Alicea MD  Date:    09/23/2022  Time:    06:53               Narrative:    EXAMINATION:  CTA CHEST NON CORONARY (PE STUDIES)    CLINICAL HISTORY:  Chest pain and shortness of breath    TECHNIQUE:  After the intravenous administration of 100 cc of Omni 350 nonionic contrast using CT pulmonary angio technique, 1.25  Mm axial images were acquired using helical CT technique from the lung apices through costophrenic sulci.  Sagittal coronal and oblique MIPS were also submitted for interpretation.    COMPARISON:  Chest x-ray dated 09/23/2022    FINDINGS:  -Pulmonary arteries: Pulmonary arteries are well opacified.  No evidence of pulmonary embolism.  No evidence of pulmonary hypertension.  No right heart strain is identified with RV/LV ratio < 0.9.    -Lungs: Bilateral lower lobe consolidative processes concerning for edema versus multifocal airspace disease or aspiration.  Septal thickening seen within the lungs bilaterally reflecting interstitial edema or interstitial pneumonia.    -Pleura: Trace right pleural effusion.    -Mediastinum/Rosalinda:Shotty adenopathy    -Axilla: No adenopathy.    -Thyroid: Normal lower gland.    -Heart/Aorta: Heart size is enlarged.  Reflux of contrast into the IVC and hepatic veins suggest right heart dysfunction.  No significant coronary artery disease.  Trace pericardial effusion. Aorta normal caliber.   Aorta demonstrates no significant atherosclerotic disease.    -Bones/Chest Wall: Intact    -Upper Abdomen: Hepatomegaly.                                       X-Ray Chest AP Portable (Final result)  Result time 09/23/22 07:36:25      Final result by Clarence Alicea MD (09/23/22 07:36:25)                   Impression:      Cardiomegaly and basilar interstitial edema suspected.      Electronically signed by: Clarence Alicea MD  Date:    09/23/2022  Time:    07:36                Narrative:    EXAMINATION:  XR CHEST AP PORTABLE    CLINICAL HISTORY:  Shortness of breath    FINDINGS:  Single view of the chest.    CT same date of service.    Cardiomegaly and basilar interstitial edema or infiltrates suspected.  Trace bilateral pleural effusions.  No pneumothorax.  Bones appear intact.                                       RADIOLOGY REPORT (Final result)  Result time 09/24/22 14:09:01      Final result by Unknown User (09/24/22 14:09:01)                                       4:43 AM: Per STAT radiology, pt's CTA Chest results:     No definitive evidence of PE.  Moderate cardiomegaly with evidence of right heart strain. Small pericardial effusion, suboptimally evaluated.  Mild pulmonary interstitial edema.  Consolidations involving both lower lobes. Small right pleural effusion.    The EKG was ordered, reviewed, and independently interpreted by the ED provider.  Interpretation time: 00:22  Rate: 113 BPM  Rhythm: sinus tachycardia  Interpretation: LVH with QRS widening and repolarization abnormality. Possible lateral infarct, age undetermined. No STEMI.           The Emergency Provider reviewed the vital signs and test results, which are outlined above.    ED Discussion     5:12 AM: Discussed case with Elda Olivo NP ([facility]). Dr. Beard agrees with current care and management of pt and accepts admission.   Admitting Service: Hospital Medicine  Admitting Physician: Dr. Beard  Admit to: Obs Tele    5:13 AM: Re-evaluated pt. I have discussed test results, shared treatment plan, and the need for admission with patient and family at bedside. Pt and family express understanding at this time and agree with all information. All questions answered. Pt and family have no further questions or concerns at this time. Pt is ready for admit.         ED Medication(s):  Medications   atorvastatin tablet 40 mg (40 mg Oral Given 9/24/22 0922)   acetaminophen tablet 650 mg (650 mg Oral Given 9/23/22  2212)   ondansetron injection 4 mg (has no administration in time range)   guaiFENesin 100 mg/5 ml syrup 200 mg (has no administration in time range)   aluminum-magnesium hydroxide-simethicone 200-200-20 mg/5 mL suspension 30 mL (has no administration in time range)   albuterol-ipratropium 2.5 mg-0.5 mg/3 mL nebulizer solution 3 mL (has no administration in time range)   sodium chloride 0.9% flush 10 mL (has no administration in time range)   cefTRIAXone (ROCEPHIN) 1 g/50 mL D5W IVPB (0 g Intravenous Stopped 9/24/22 0552)   metoprolol succinate (TOPROL-XL) 24 hr tablet 25 mg (0 mg Oral Hold 9/24/22 0900)   furosemide injection 40 mg (40 mg Intravenous Given 9/23/22 0153)   HYDROcodone-acetaminophen 5-325 mg per tablet 1 tablet (1 tablet Oral Given 9/23/22 0209)   iohexoL (OMNIPAQUE 350) injection 100 mL (100 mLs Intravenous Given 9/23/22 0312)   cefTRIAXone (ROCEPHIN) 1 g/50 mL D5W IVPB (0 g Intravenous Stopped 9/23/22 0633)   furosemide injection 40 mg (40 mg Intravenous Given 9/23/22 2208)   furosemide injection 20 mg (20 mg Intravenous Given 9/24/22 1220)         Current Discharge Medication List            Medical Decision Making    Medical Decision Making:   Clinical Tests:   Lab Tests: Ordered and Reviewed  Radiological Study: Ordered and Reviewed  Medical Tests: Ordered and Reviewed         Scribe Attestation:   Scribe #1: I performed the above scribed service and the documentation accurately describes the services I performed. I attest to the accuracy of the note.    Attending:   Physician Attestation Statement for Scribe #1: I, Natacha Chicas MD, personally performed the services described in this documentation, as scribed by Archie Kc, in my presence, and it is both accurate and complete.          Clinical Impression       ICD-10-CM ICD-9-CM   1. Hemoptysis  R04.2 786.30   2. SOB (shortness of breath)  R06.02 786.05   3. CHF (congestive heart failure)  I50.9 428.0   4. Congestive heart failure, unspecified  HF chronicity, unspecified heart failure type  I50.9 428.0   5. Multifocal lung consolidation  J18.1 481   6. Anemia, unspecified type  D64.9 285.9   7. Right leg pain  M79.604 729.5   8. Leg pain, right  M79.604 729.5   9. Acute on chronic systolic congestive heart failure  I50.23 428.23     428.0   10. Cocaine abuse  F14.10 305.60   11. Non compliance w medication regimen  Z91.14 V15.81   12. Tobacco use  Z72.0 305.1       Disposition:   Disposition: Placed in Observation  Condition: Fair       Natacha Chicas MD  09/24/22 2819

## 2022-09-23 NOTE — ASSESSMENT & PLAN NOTE
-EF 25% on echo done in June 2022 at [facility]  -repeat echo here  -Lasix 40 mg IV BID x 2 doses, re-evaluate  -CHF pathway

## 2022-09-23 NOTE — SUBJECTIVE & OBJECTIVE
Past Medical History:   Diagnosis Date    Bipolar disorder, unspecified     CHF (congestive heart failure)     Hypertension        History reviewed. No pertinent surgical history.    Review of patient's allergies indicates:  No Known Allergies    No current facility-administered medications on file prior to encounter.     Current Outpatient Medications on File Prior to Encounter   Medication Sig    apixaban (ELIQUIS) 5 mg Tab Take 5 mg by mouth.    metoprolol succinate (TOPROL-XL) 25 MG 24 hr tablet Take 1 tablet by mouth once daily.    potassium chloride SA (K-DUR,KLOR-CON M) 10 MEQ tablet Take 1 tablet by mouth once daily.    aspirin (ECOTRIN) 81 MG EC tablet Take 81 mg by mouth once daily.    atorvastatin (LIPITOR) 40 MG tablet Take 40 mg by mouth once daily.    benzonatate (TESSALON) 200 MG capsule Take by mouth.    carvediloL (COREG) 6.25 MG tablet Take 6.25 mg by mouth 2 (two) times daily.    ferrous sulfate 324 mg (65 mg iron) TbEC Take by mouth once daily.    furosemide (LASIX) 40 MG tablet Take 40 mg by mouth once daily.    JARDIANCE 10 mg tablet Take 10 mg by mouth once daily.    QUEtiapine (SEROQUEL) 100 MG Tab Take 100 mg by mouth every evening.     Family History       Problem Relation (Age of Onset)    Hypertension Mother, Father          Tobacco Use    Smoking status: Every Day     Types: Cigarettes    Smokeless tobacco: Never   Substance and Sexual Activity    Alcohol use: Yes    Drug use: Yes     Types: Cocaine    Sexual activity: Not on file     Review of Systems   Constitutional:  Positive for fatigue. Negative for chills and fever.   HENT: Negative.  Negative for congestion, rhinorrhea, sore throat and trouble swallowing.    Eyes: Negative.    Respiratory:  Positive for cough (blood) and shortness of breath. Negative for wheezing.    Cardiovascular:  Positive for leg swelling. Negative for chest pain and palpitations.   Gastrointestinal: Negative.  Negative for abdominal pain, diarrhea, nausea  and vomiting.   Endocrine: Negative.    Genitourinary: Negative.  Negative for dysuria and flank pain.   Musculoskeletal: Negative.  Negative for back pain.   Skin: Negative.  Negative for rash.   Allergic/Immunologic: Negative.    Neurological: Negative.  Negative for speech difficulty, weakness, numbness and headaches.   Hematological: Negative.    Psychiatric/Behavioral: Negative.  Negative for hallucinations. The patient is not nervous/anxious.    All other systems reviewed and are negative.  Objective:     Vital Signs (Most Recent):  Temp: 98.8 °F (37.1 °C) (09/22/22 2340)  Pulse: (!) 115 (09/22/22 2355)  Resp: 18 (09/23/22 0209)  BP: 129/87 (09/22/22 2355)  SpO2: 100 % (09/22/22 2355)   Vital Signs (24h Range):  Temp:  [98.8 °F (37.1 °C)] 98.8 °F (37.1 °C)  Pulse:  [] 115  Resp:  [18-20] 18  SpO2:  [100 %] 100 %  BP: (122-129)/(68-87) 129/87        There is no height or weight on file to calculate BMI.    Physical Exam  Vitals and nursing note reviewed.   Constitutional:       General: She is awake. She is not in acute distress.     Appearance: She is not ill-appearing.      Interventions: Nasal cannula in place.      Comments: Currently on 2 L O2 N/C   HENT:      Head: Normocephalic and atraumatic.      Mouth/Throat:      Mouth: Mucous membranes are moist.   Eyes:      General: No scleral icterus.     Conjunctiva/sclera: Conjunctivae normal.   Cardiovascular:      Rate and Rhythm: Normal rate and regular rhythm.      Heart sounds: No murmur heard.  Pulmonary:      Effort: Pulmonary effort is normal. No respiratory distress.      Breath sounds: Rales present. No wheezing.   Abdominal:      Palpations: Abdomen is soft.      Tenderness: There is no abdominal tenderness.   Musculoskeletal:         General: Swelling (Trace edema bilateral ankles) present. Normal range of motion.      Cervical back: Normal range of motion and neck supple.   Skin:     General: Skin is warm.   Neurological:      General: No  focal deficit present.      Mental Status: She is alert and oriented to person, place, and time. Mental status is at baseline.   Psychiatric:         Attention and Perception: Attention normal.         Mood and Affect: Affect is flat.         Speech: Speech normal.         Behavior: Behavior is cooperative.           Significant Labs: All pertinent labs within the past 24 hours have been reviewed.  BMP:   Recent Labs   Lab 09/23/22 0053   GLU 74      K 4.2      CO2 18*   BUN 16   CREATININE 0.9   CALCIUM 8.6*   MG 1.8     CBC:   Recent Labs   Lab 09/23/22 0053   WBC 5.87   HGB 7.3*   HCT 25.8*        CMP:   Recent Labs   Lab 09/23/22 0053      K 4.2      CO2 18*   GLU 74   BUN 16   CREATININE 0.9   CALCIUM 8.6*   PROT 6.6   ALBUMIN 2.8*   BILITOT 3.0*   ALKPHOS 75   AST 22   ALT 17   ANIONGAP 13     Troponin:   Recent Labs   Lab 09/23/22 0053   TROPONINI 0.024       Significant Imaging: I have reviewed all pertinent imaging results/findings within the past 24 hours.  I have reviewed and interpreted all pertinent imaging results/findings within the past 24 hours.    Imaging Results              CTA Chest Non-Coronary (PE Studies) (In process)                      X-Ray Chest AP Portable (In process)                   Per STAT radiology, pt's CTA Chest results:      No definitive evidence of PE.  Moderate cardiomegaly with evidence of right heart strain. Small pericardial effusion, suboptimally evaluated.  Mild pulmonary interstitial edema.  Consolidations involving both lower lobes. Small right pleural effusion    I have independently reviewed and interpreted the EKG.     I have independently reviewed all pertinent labs within the past 24 hours.    I have independently reviewed, visualized and interpreted all pertinent imaging results within the past 24 hours and discussed the findings with the ED physician, Dr. Zee

## 2022-09-24 PROBLEM — R31.9 URINARY TRACT INFECTION WITH HEMATURIA: Status: ACTIVE | Noted: 2022-09-24

## 2022-09-24 PROBLEM — N39.0 URINARY TRACT INFECTION WITH HEMATURIA: Status: ACTIVE | Noted: 2022-09-24

## 2022-09-24 LAB
ALBUMIN SERPL BCP-MCNC: 2.5 G/DL (ref 3.5–5.2)
ALP SERPL-CCNC: 79 U/L (ref 55–135)
ALT SERPL W/O P-5'-P-CCNC: 17 U/L (ref 10–44)
ANION GAP SERPL CALC-SCNC: 14 MMOL/L (ref 8–16)
ANISOCYTOSIS BLD QL SMEAR: ABNORMAL
AST SERPL-CCNC: 19 U/L (ref 10–40)
BASOPHILS # BLD AUTO: 0.04 K/UL (ref 0–0.2)
BASOPHILS NFR BLD: 0.7 % (ref 0–1.9)
BILIRUB SERPL-MCNC: 2.7 MG/DL (ref 0.1–1)
BUN SERPL-MCNC: 22 MG/DL (ref 6–20)
BURR CELLS BLD QL SMEAR: ABNORMAL
CALCIUM SERPL-MCNC: 8.4 MG/DL (ref 8.7–10.5)
CHLORIDE SERPL-SCNC: 102 MMOL/L (ref 95–110)
CO2 SERPL-SCNC: 18 MMOL/L (ref 23–29)
CREAT SERPL-MCNC: 1 MG/DL (ref 0.5–1.4)
DACRYOCYTES BLD QL SMEAR: ABNORMAL
DIFFERENTIAL METHOD: ABNORMAL
EOSINOPHIL # BLD AUTO: 0.1 K/UL (ref 0–0.5)
EOSINOPHIL NFR BLD: 2.4 % (ref 0–8)
ERYTHROCYTE [DISTWIDTH] IN BLOOD BY AUTOMATED COUNT: 25.2 % (ref 11.5–14.5)
EST. GFR  (NO RACE VARIABLE): >60 ML/MIN/1.73 M^2
ESTIMATED AVG GLUCOSE: 97 MG/DL (ref 68–131)
GLUCOSE SERPL-MCNC: 91 MG/DL (ref 70–110)
HBA1C MFR BLD: 5 % (ref 4–5.6)
HCT VFR BLD AUTO: 26.8 % (ref 37–48.5)
HGB BLD-MCNC: 7.8 G/DL (ref 12–16)
HYPOCHROMIA BLD QL SMEAR: ABNORMAL
IMM GRANULOCYTES # BLD AUTO: 0.02 K/UL (ref 0–0.04)
IMM GRANULOCYTES NFR BLD AUTO: 0.4 % (ref 0–0.5)
LYMPHOCYTES # BLD AUTO: 2.3 K/UL (ref 1–4.8)
LYMPHOCYTES NFR BLD: 42.2 % (ref 18–48)
MAGNESIUM SERPL-MCNC: 1.7 MG/DL (ref 1.6–2.6)
MCH RBC QN AUTO: 23.3 PG (ref 27–31)
MCHC RBC AUTO-ENTMCNC: 29.1 G/DL (ref 32–36)
MCV RBC AUTO: 80 FL (ref 82–98)
MONOCYTES # BLD AUTO: 0.4 K/UL (ref 0.3–1)
MONOCYTES NFR BLD: 6.7 % (ref 4–15)
NEUTROPHILS # BLD AUTO: 2.6 K/UL (ref 1.8–7.7)
NEUTROPHILS NFR BLD: 47.6 % (ref 38–73)
NRBC BLD-RTO: 0 /100 WBC
OVALOCYTES BLD QL SMEAR: ABNORMAL
PLATELET # BLD AUTO: 237 K/UL (ref 150–450)
PLATELET BLD QL SMEAR: ABNORMAL
PMV BLD AUTO: 9.8 FL (ref 9.2–12.9)
POIKILOCYTOSIS BLD QL SMEAR: ABNORMAL
POLYCHROMASIA BLD QL SMEAR: ABNORMAL
POTASSIUM SERPL-SCNC: 3.9 MMOL/L (ref 3.5–5.1)
PROT SERPL-MCNC: 5.7 G/DL (ref 6–8.4)
RBC # BLD AUTO: 3.35 M/UL (ref 4–5.4)
SCHISTOCYTES BLD QL SMEAR: PRESENT
SODIUM SERPL-SCNC: 134 MMOL/L (ref 136–145)
TARGETS BLD QL SMEAR: ABNORMAL
WBC # BLD AUTO: 5.5 K/UL (ref 3.9–12.7)

## 2022-09-24 PROCEDURE — 63600175 PHARM REV CODE 636 W HCPCS: Performed by: STUDENT IN AN ORGANIZED HEALTH CARE EDUCATION/TRAINING PROGRAM

## 2022-09-24 PROCEDURE — 99233 PR SUBSEQUENT HOSPITAL CARE,LEVL III: ICD-10-PCS | Mod: ,,, | Performed by: STUDENT IN AN ORGANIZED HEALTH CARE EDUCATION/TRAINING PROGRAM

## 2022-09-24 PROCEDURE — 36415 COLL VENOUS BLD VENIPUNCTURE: CPT | Performed by: INTERNAL MEDICINE

## 2022-09-24 PROCEDURE — 83735 ASSAY OF MAGNESIUM: CPT | Performed by: INTERNAL MEDICINE

## 2022-09-24 PROCEDURE — 99233 SBSQ HOSP IP/OBS HIGH 50: CPT | Mod: ,,, | Performed by: STUDENT IN AN ORGANIZED HEALTH CARE EDUCATION/TRAINING PROGRAM

## 2022-09-24 PROCEDURE — 25000003 PHARM REV CODE 250: Performed by: NURSE PRACTITIONER

## 2022-09-24 PROCEDURE — 85025 COMPLETE CBC W/AUTO DIFF WBC: CPT | Performed by: INTERNAL MEDICINE

## 2022-09-24 PROCEDURE — 63600175 PHARM REV CODE 636 W HCPCS: Performed by: NURSE PRACTITIONER

## 2022-09-24 PROCEDURE — G0378 HOSPITAL OBSERVATION PER HR: HCPCS

## 2022-09-24 PROCEDURE — 80053 COMPREHEN METABOLIC PANEL: CPT | Performed by: INTERNAL MEDICINE

## 2022-09-24 PROCEDURE — 25000003 PHARM REV CODE 250: Performed by: INTERNAL MEDICINE

## 2022-09-24 PROCEDURE — 11000001 HC ACUTE MED/SURG PRIVATE ROOM

## 2022-09-24 RX ORDER — FUROSEMIDE 10 MG/ML
20 INJECTION INTRAMUSCULAR; INTRAVENOUS ONCE
Status: COMPLETED | OUTPATIENT
Start: 2022-09-24 | End: 2022-09-24

## 2022-09-24 RX ORDER — FUROSEMIDE 10 MG/ML
40 INJECTION INTRAMUSCULAR; INTRAVENOUS
Status: DISCONTINUED | OUTPATIENT
Start: 2022-09-24 | End: 2022-09-30 | Stop reason: HOSPADM

## 2022-09-24 RX ORDER — FUROSEMIDE 10 MG/ML
40 INJECTION INTRAMUSCULAR; INTRAVENOUS ONCE
Status: DISCONTINUED | OUTPATIENT
Start: 2022-09-24 | End: 2022-09-24

## 2022-09-24 RX ORDER — FUROSEMIDE 10 MG/ML
40 INJECTION INTRAMUSCULAR; INTRAVENOUS
Status: DISCONTINUED | OUTPATIENT
Start: 2022-09-24 | End: 2022-09-24

## 2022-09-24 RX ORDER — BUTALBITAL, ACETAMINOPHEN AND CAFFEINE 50; 325; 40 MG/1; MG/1; MG/1
1 TABLET ORAL ONCE
Status: COMPLETED | OUTPATIENT
Start: 2022-09-24 | End: 2022-09-24

## 2022-09-24 RX ADMIN — BUTALBITAL, ACETAMINOPHEN AND CAFFEINE 1 TABLET: 50; 325; 40 TABLET ORAL at 08:09

## 2022-09-24 RX ADMIN — ATORVASTATIN CALCIUM 40 MG: 40 TABLET, FILM COATED ORAL at 09:09

## 2022-09-24 RX ADMIN — FUROSEMIDE 40 MG: 10 INJECTION, SOLUTION INTRAVENOUS at 09:09

## 2022-09-24 RX ADMIN — FUROSEMIDE 20 MG: 10 INJECTION, SOLUTION INTRAMUSCULAR; INTRAVENOUS at 12:09

## 2022-09-24 RX ADMIN — ACETAMINOPHEN 650 MG: 325 TABLET ORAL at 07:09

## 2022-09-24 RX ADMIN — CEFTRIAXONE 1 G: 1 INJECTION, SOLUTION INTRAVENOUS at 05:09

## 2022-09-24 NOTE — HOSPITAL COURSE
9/23: 37-year-old female admitted for hemoptysis, upon further investigation patient has blood-tinged sputum not vomitus.  Iron studies pending.  Since stopping Eliquis this hospitalization she has not had any blood-tinged sputum production.  BB resumed and will further diurese.  Monitor H/ H    9/24: Iron studies c/w with anemia, although Hgb improved. Continues to cough up blood tinged sputum which is likely related to CHF and pulm edema. R/LHC Monday per Cards. Diurese and monitor strict I&Os. CT chest showed concern for PNA vs edema but no signs of lesion. No leukocytosis, afebrile. Cont treating UTI.    9/25: Episode of chest pain this am. Trops negative. Cont IV diuresis with Lasix. Plan for R/LHC tomorrow. Cards following. Hgb stable and improved to 7.9 despite patient coughing up bloody sputum and as stated above likely from right heart failure and pulm edema.    9/26: Cont IV diuresis and once volume status improved plan for MPI stress test per Cards. Hgb improving. Procal negative but CRP elevated treating for PNA empirically. Cont holding Eliquis and send sputum to micro.     9/27: Cont IV diuresis and optimize for MPI stress test. Will add midodrine to augment BP because patient definitely needs IV diuresis. Cont abx. Hemoptysis slowly improving.     9/28: Will need an IVC filter because unable to tolerate PO AC. Scheduled to be placed today per IR. Hgb remains stable. CXR this am showed no significant change--per pulm possible Bronch? Cards following.     9/29: Initiated on low dose Eliquis per Pulm recs. IVC filter in place. Needs Lasix low dose per Cards with close follow up. Discharge on PO Levaquinx5 days.  Pulm and cards referrals sent. If stable d/c in the am     9/30 Patient given Iv venofer for low iron and dose of sq Vit B12. Patient's overall condition remained stable and improved and she was discharged to home once Life vest obtained.

## 2022-09-24 NOTE — PROGRESS NOTES
[facility] Medicine  Progress Note    Patient Name: Teresa Echols  MRN: [id number]  Patient Class: OP- Observation   Admission Date: 9/22/2022  Length of Stay: 0 days  Attending Physician: Irvin Escalante MD  Primary Care Provider: Juan Contreras MD        Subjective:     Principal Problem:Hemoptysis        HPI:  Ms. Echols is a 37-year-old  female with PMH significant for systolic CHF (EF 25% on echo done June 2022), chronic cocaine user, right lower extremity DVT status post thrombectomy on in June), presented to [facility] ED complaining of coughing up blood the past 2-3 months, since the thrombectomy procedure.  Patient on Xarelto.  States that she has been to multiple hospitals for the same issue, was told that there is nothing abnormal and was sent home.  Denies chest pain, but complains of SOB, worsen with exertion.  Denies fever, chills.  /68.  Afebrile.  HR .  SaO2 100% on 2 L O2 N/C (not home oxygen dependent).  Laboratory workup reveals hemoglobin 7.3, MCV 81, INR 1.4.  D-dimer elevated 2.65.  CTA chest negative for PE, but reveals enlarged cardiac silhouette, right heart strain, increased vascular congestion.  BNP elevated 3610.  Troponin 0.024.  UDS pending.  Received Lasix 40 mg IV x1 in the ED.    Admitting diagnosis:  Hemoptysis.  Acute on chronic systolic CHF.  Chronic cocaine user.  History of right leg DVT, status post thrombectomy in June 2022.  On Xarelto.      Overview/Hospital Course:  9/23: 37-year-old female admitted for hemoptysis, upon further investigation patient has blood-tinged sputum not vomitus.  Iron studies pending.  Since stopping Eliquis this hospitalization she has not had any blood-tinged sputum production.  BB resumed and will further diurese.  Monitor H/ H    9/24: Iron studies c/w with anemia, although Hgb improved. Continues to cough up blood tinged sputum which is likely related to CHF and pulm edema. R/C  Monday per Cards. Diurese and monitor strict I&Os. CT chest showed concern for PNA vs edema but no signs of lesion. No leukocytosis, afebrile. Cont treating UTI.      Interval History: violet studies c/w with anemia, although Hgb improved. Continues to cough up blood tinged sputum which is likely related to CHF and pulm edema. R/LHC Monday per Cards. Diurese and monitor strict I&Os. CT chest showed concern for PNA vs edema but no signs of lesion. No leukocytosis, afebrile. Cont treating UTI.    Review of Systems   Constitutional:  Positive for fatigue. Negative for chills and fever.   HENT: Negative.  Negative for congestion, rhinorrhea, sore throat and trouble swallowing.    Eyes: Negative.    Respiratory:  Positive for cough (blood) and shortness of breath. Negative for wheezing.    Cardiovascular:  Negative for chest pain, palpitations and leg swelling.   Gastrointestinal: Negative.  Negative for abdominal pain, diarrhea, nausea and vomiting.   Endocrine: Negative.    Genitourinary: Negative.  Negative for dysuria and flank pain.   Musculoskeletal: Negative.  Negative for back pain.   Skin: Negative.  Negative for rash.   Allergic/Immunologic: Negative.    Neurological: Negative.  Negative for speech difficulty, weakness, numbness and headaches.   Hematological: Negative.    Psychiatric/Behavioral: Negative.  Negative for hallucinations. The patient is not nervous/anxious.    All other systems reviewed and are negative.  Objective:     Vital Signs (Most Recent):  Temp: 98.2 °F (36.8 °C) (09/24/22 1136)  Pulse: 92 (09/24/22 1136)  Resp: 19 (09/24/22 1136)  BP: 92/71 (09/24/22 1136)  SpO2: 100 % (09/24/22 1136) Vital Signs (24h Range):  Temp:  [97.2 °F (36.2 °C)-98.2 °F (36.8 °C)] 98.2 °F (36.8 °C)  Pulse:  [92-98] 92  Resp:  [16-19] 19  SpO2:  [92 %-100 %] 100 %  BP: ()/(63-73) 92/71     Weight: 56.7 kg (125 lb)  Body mass index is 21.46 kg/m².    Intake/Output Summary (Last 24 hours) at 9/24/2022 1332  Last  data filed at 9/24/2022 1200  Gross per 24 hour   Intake 300 ml   Output 200 ml   Net 100 ml      Physical Exam  Vitals and nursing note reviewed.   Constitutional:       General: She is awake. She is not in acute distress.     Appearance: She is not ill-appearing.      Interventions: Nasal cannula in place.      Comments: Currently on 2 L O2 N/C   HENT:      Head: Normocephalic and atraumatic.      Mouth/Throat:      Mouth: Mucous membranes are moist.   Eyes:      General: No scleral icterus.     Conjunctiva/sclera: Conjunctivae normal.   Cardiovascular:      Rate and Rhythm: Normal rate and regular rhythm.      Heart sounds: No murmur heard.  Pulmonary:      Effort: Pulmonary effort is normal. No respiratory distress.      Breath sounds: No wheezing, rhonchi or rales.      Comments: Blood tinged sputum  Abdominal:      Palpations: Abdomen is soft.      Tenderness: There is no abdominal tenderness.   Musculoskeletal:         General: No swelling (Trace edema bilateral ankles). Normal range of motion.      Cervical back: Normal range of motion and neck supple.   Skin:     General: Skin is warm.   Neurological:      General: No focal deficit present.      Mental Status: She is alert and oriented to person, place, and time. Mental status is at baseline.   Psychiatric:         Attention and Perception: Attention normal.         Mood and Affect: Affect is flat.         Speech: Speech normal.         Behavior: Behavior is cooperative.       Significant Labs: All pertinent labs within the past 24 hours have been reviewed.    Significant Imaging: I have reviewed all pertinent imaging results/findings within the past 24 hours.      Assessment/Plan:      * Hemoptysis  -holding Eliquis  -BP stable  -CTA chest negative for PE or masses    9/24:  Cont holding Eliquis  Differentials include CHF-Pulm edema vs GI issue. CTA chest negative for masses  R/LHC on Monday per Cards  Hgb remains stable    Urinary tract infection with  hematuria  UA c/w UTI  IV Rocephin   Urine Cx pending    SOB (shortness of breath)  Resolved       Anemia  Iron studies c/w anemia  Does not warrant transfusion currently      Chronic deep vein thrombosis (DVT) of right lower extremity  -s/p thrombectomy few months ago at [facility]  -Has been on Xarelto  -hold due to hemoptysis    9/24:  Eliquis on hold  CTA negative for PE  US RLE negative for DVT        Tobacco use    Assistance with smoking cessation was offered, including:  []  Medications  [x]  Counseling  []  Printed Information on Smoking Cessation  []  Referral to a Smoking Cessation Program    Patient was counseled regarding smoking for 3-10 minutes.        Non compliance w medication regimen         Cocaine abuse  -per Care Everywhere, has been snorting cocaine since age 18  -UDS pending, hold BB until resulted    9/24:  UDS negative  BB resumed     Acute on chronic systolic congestive heart failure  -EF 25% on echo done in June 2022 at [facility]  -repeat echo here  -Lasix 40 mg IV BID x 2 doses, re-evaluate  -CHF pathway    9/24:  Patient is identified as having Combined Systolic and Diastolic heart failure that is Acute on chronic. CHF is currently uncontrolled due to Rales/crackles on pulmonary exam and Pulmonary edema/pleural effusion on CXR. Latest ECHO performed and demonstrates- Results for orders placed during the hospital encounter of 09/22/22    Echo    Interpretation Summary  · The left ventricle is severely enlarged with concentric hypertrophy and severely decreased systolic function.  · The estimated ejection fraction is 15%.  · Severe left atrial enlargement.  · Severe right atrial enlargement.  · Grade III left ventricular diastolic dysfunction.  · The estimated PA systolic pressure is 106 mmHg.  · Normal right ventricular size with mildly reduced right ventricular systolic function.  · There is pulmonary hypertension.  · Elevated central venous pressure (15 mmHg).  · Moderate to severe tricuspid  regurgitation.  · There is severe left ventricular global hypokinesis.  · Moderate pulmonic regurgitation.  · Moderate mitral regurgitation.  · There is a small right pleural effusion.  . Continue Beta Blocker and Furosemide and monitor clinical status closely. Monitor on telemetry. Patient is on CHF pathway.  Monitor strict Is&Os and daily weights.  Place on fluid restriction of 1.5 L. Continue to stress to patient importance of self efficacy and  on diet for CHF. Last BNP reviewed- and noted below   Recent Labs   Lab 09/23/22  0053   BNP 3,610*     Lasix IV  Strict I&Os  Daily weights  1500ml fluid restriction  2gm Na diet  Cards consult   Plan for R/LHC on Monday          VTE Risk Mitigation (From admission, onward)         Ordered     Place sequential compression device  Until discontinued         09/23/22 0539     IP VTE HIGH RISK PATIENT  Once         09/23/22 0539                Discharge Planning   PEPITO:      Code Status: Full Code   Is the patient medically ready for discharge?:     Reason for patient still in hospital (select all that apply): Patient unstable, Treatment and Consult recommendations  Discharge Plan A: Home with family                  Luna Escalante NP  Department of Hospital Medicine   [facility] - Med Surg

## 2022-09-24 NOTE — ASSESSMENT & PLAN NOTE
-per Care Everywhere, has been snorting cocaine since age 18  -UDS pending, hold BB until resulted    9/24:  UDS negative  BB resumed

## 2022-09-24 NOTE — ASSESSMENT & PLAN NOTE
-holding Eliquis  -BP stable  -CTA chest negative for PE or masses    9/24:  Cont holding Eliquis  Differentials include CHF-Pulm edema vs GI issue. CTA chest negative for masses  R/LHC on Monday per Cards  Hgb remains stable

## 2022-09-24 NOTE — ASSESSMENT & PLAN NOTE
Assistance with smoking cessation was offered, including:  []  Medications  [x]  Counseling  []  Printed Information on Smoking Cessation  []  Referral to a Smoking Cessation Program    Patient was counseled regarding smoking for 3-10 minutes.

## 2022-09-24 NOTE — ASSESSMENT & PLAN NOTE
-s/p thrombectomy few months ago at [facility]  -Has been on Xarelto  -hold due to hemoptysis    9/24:  Eliquis on hold  CTA negative for PE  US RLE negative for DVT

## 2022-09-24 NOTE — ASSESSMENT & PLAN NOTE
-EF 25% on [facility] in [facility]  -repeat echo here  -Lasix 40 mg IV BID x 2 doses, re-evaluate  -CHF pathway    9/24:  Patient is identified as having Combined Systolic and Diastolic heart failure that is Acute on chronic. CHF is currently uncontrolled due to Rales/crackles on pulmonary exam and Pulmonary edema/pleural effusion on CXR. Latest ECHO performed and demonstrates- Results for orders placed during the hospital encounter of 09/22/22    Echo    Interpretation Summary  · The left ventricle is severely enlarged with concentric hypertrophy and severely decreased systolic function.  · The estimated ejection fraction is 15%.  · Severe left atrial enlargement.  · Severe right atrial enlargement.  · Grade III left ventricular diastolic dysfunction.  · The estimated PA systolic pressure is 106 mmHg.  · Normal right ventricular size with mildly reduced right ventricular systolic function.  · There is pulmonary hypertension.  · Elevated central venous pressure (15 mmHg).  · Moderate to severe tricuspid regurgitation.  · There is severe left ventricular global hypokinesis.  · Moderate pulmonic regurgitation.  · Moderate mitral regurgitation.  · There is a small right pleural effusion.  . Continue Beta Blocker and Furosemide and monitor clinical status closely. Monitor on telemetry. Patient is on CHF pathway.  Monitor strict Is&Os and daily weights.  Place on fluid restriction of 1.5 L. Continue to stress to patient importance of self efficacy and  on diet for CHF. Last BNP reviewed- and noted below   Recent Labs   Lab 09/23/22  0053   BNP 3,610*     Lasix IV  Strict I&Os  Daily weights  1500ml fluid restriction  2gm Na diet  Cards consult   Plan for R/LHC on Monday

## 2022-09-24 NOTE — PLAN OF CARE
BP soft otherwise VSS. Fall precautions maintained.   No pain complaints.  NSR on cardiac monitor.  Still coughing out bloody mucus.  Lasix given 1x -voided.  Ambulates to BR.  1.5fluid restriction.  All needs met. Will continue to monitor.

## 2022-09-24 NOTE — SUBJECTIVE & OBJECTIVE
Interval History: violet studies c/w with anemia, although Hgb improved. Continues to cough up blood tinged sputum which is likely related to CHF and pulm edema. R/LHC Monday per Cards. Diurese and monitor strict I&Os. CT chest showed concern for PNA vs edema but no signs of lesion. No leukocytosis, afebrile. Cont treating UTI.    Review of Systems   Constitutional:  Positive for fatigue. Negative for chills and fever.   HENT: Negative.  Negative for congestion, rhinorrhea, sore throat and trouble swallowing.    Eyes: Negative.    Respiratory:  Positive for cough (blood) and shortness of breath. Negative for wheezing.    Cardiovascular:  Negative for chest pain, palpitations and leg swelling.   Gastrointestinal: Negative.  Negative for abdominal pain, diarrhea, nausea and vomiting.   Endocrine: Negative.    Genitourinary: Negative.  Negative for dysuria and flank pain.   Musculoskeletal: Negative.  Negative for back pain.   Skin: Negative.  Negative for rash.   Allergic/Immunologic: Negative.    Neurological: Negative.  Negative for speech difficulty, weakness, numbness and headaches.   Hematological: Negative.    Psychiatric/Behavioral: Negative.  Negative for hallucinations. The patient is not nervous/anxious.    All other systems reviewed and are negative.  Objective:     Vital Signs (Most Recent):  Temp: 98.2 °F (36.8 °C) (09/24/22 1136)  Pulse: 92 (09/24/22 1136)  Resp: 19 (09/24/22 1136)  BP: 92/71 (09/24/22 1136)  SpO2: 100 % (09/24/22 1136) Vital Signs (24h Range):  Temp:  [97.2 °F (36.2 °C)-98.2 °F (36.8 °C)] 98.2 °F (36.8 °C)  Pulse:  [92-98] 92  Resp:  [16-19] 19  SpO2:  [92 %-100 %] 100 %  BP: ()/(63-73) 92/71     Weight: 56.7 kg (125 lb)  Body mass index is 21.46 kg/m².    Intake/Output Summary (Last 24 hours) at 9/24/2022 1332  Last data filed at 9/24/2022 1200  Gross per 24 hour   Intake 300 ml   Output 200 ml   Net 100 ml      Physical Exam  Vitals and nursing note reviewed.   Constitutional:        General: She is awake. She is not in acute distress.     Appearance: She is not ill-appearing.      Interventions: Nasal cannula in place.      Comments: Currently on 2 L O2 N/C   HENT:      Head: Normocephalic and atraumatic.      Mouth/Throat:      Mouth: Mucous membranes are moist.   Eyes:      General: No scleral icterus.     Conjunctiva/sclera: Conjunctivae normal.   Cardiovascular:      Rate and Rhythm: Normal rate and regular rhythm.      Heart sounds: No murmur heard.  Pulmonary:      Effort: Pulmonary effort is normal. No respiratory distress.      Breath sounds: No wheezing, rhonchi or rales.      Comments: Blood tinged sputum  Abdominal:      Palpations: Abdomen is soft.      Tenderness: There is no abdominal tenderness.   Musculoskeletal:         General: No swelling (Trace edema bilateral ankles). Normal range of motion.      Cervical back: Normal range of motion and neck supple.   Skin:     General: Skin is warm.   Neurological:      General: No focal deficit present.      Mental Status: She is alert and oriented to person, place, and time. Mental status is at baseline.   Psychiatric:         Attention and Perception: Attention normal.         Mood and Affect: Affect is flat.         Speech: Speech normal.         Behavior: Behavior is cooperative.       Significant Labs: All pertinent labs within the past 24 hours have been reviewed.    Significant Imaging: I have reviewed all pertinent imaging results/findings within the past 24 hours.

## 2022-09-25 PROBLEM — I74.3 ARTERIAL EMBOLISM AND THROMBOSIS OF LOWER EXTREMITY: Status: ACTIVE | Noted: 2022-09-25

## 2022-09-25 PROBLEM — I82.501 CHRONIC DEEP VEIN THROMBOSIS (DVT) OF RIGHT LOWER EXTREMITY: Status: RESOLVED | Noted: 2022-09-23 | Resolved: 2022-09-25

## 2022-09-25 LAB
ACANTHOCYTES BLD QL SMEAR: PRESENT
ALBUMIN SERPL BCP-MCNC: 2.4 G/DL (ref 3.5–5.2)
ALP SERPL-CCNC: 117 U/L (ref 55–135)
ALT SERPL W/O P-5'-P-CCNC: 13 U/L (ref 10–44)
ANION GAP SERPL CALC-SCNC: 10 MMOL/L (ref 8–16)
ANISOCYTOSIS BLD QL SMEAR: ABNORMAL
AST SERPL-CCNC: 18 U/L (ref 10–40)
BACTERIA UR CULT: ABNORMAL
BASOPHILS # BLD AUTO: 0.04 K/UL (ref 0–0.2)
BASOPHILS NFR BLD: 0.7 % (ref 0–1.9)
BILIRUB SERPL-MCNC: 1.9 MG/DL (ref 0.1–1)
BUN SERPL-MCNC: 16 MG/DL (ref 6–20)
BURR CELLS BLD QL SMEAR: ABNORMAL
CALCIUM SERPL-MCNC: 8.1 MG/DL (ref 8.7–10.5)
CHLORIDE SERPL-SCNC: 105 MMOL/L (ref 95–110)
CO2 SERPL-SCNC: 21 MMOL/L (ref 23–29)
CREAT SERPL-MCNC: 0.8 MG/DL (ref 0.5–1.4)
CRP SERPL-MCNC: 25 MG/L (ref 0–8.2)
DACRYOCYTES BLD QL SMEAR: ABNORMAL
DIFFERENTIAL METHOD: ABNORMAL
EOSINOPHIL # BLD AUTO: 0.2 K/UL (ref 0–0.5)
EOSINOPHIL NFR BLD: 2.8 % (ref 0–8)
ERYTHROCYTE [DISTWIDTH] IN BLOOD BY AUTOMATED COUNT: 25.5 % (ref 11.5–14.5)
EST. GFR  (NO RACE VARIABLE): >60 ML/MIN/1.73 M^2
GIANT PLATELETS BLD QL SMEAR: PRESENT
GLUCOSE SERPL-MCNC: 115 MG/DL (ref 70–110)
HCT VFR BLD AUTO: 27.7 % (ref 37–48.5)
HGB BLD-MCNC: 7.9 G/DL (ref 12–16)
HYPOCHROMIA BLD QL SMEAR: ABNORMAL
IMM GRANULOCYTES # BLD AUTO: 0.03 K/UL (ref 0–0.04)
IMM GRANULOCYTES NFR BLD AUTO: 0.5 % (ref 0–0.5)
LACTATE SERPL-SCNC: 2.2 MMOL/L (ref 0.5–2.2)
LYMPHOCYTES # BLD AUTO: 1.5 K/UL (ref 1–4.8)
LYMPHOCYTES NFR BLD: 26.2 % (ref 18–48)
MCH RBC QN AUTO: 22.4 PG (ref 27–31)
MCHC RBC AUTO-ENTMCNC: 28.5 G/DL (ref 32–36)
MCV RBC AUTO: 79 FL (ref 82–98)
MONOCYTES # BLD AUTO: 0.4 K/UL (ref 0.3–1)
MONOCYTES NFR BLD: 6.6 % (ref 4–15)
NEUTROPHILS # BLD AUTO: 3.6 K/UL (ref 1.8–7.7)
NEUTROPHILS NFR BLD: 63.2 % (ref 38–73)
NRBC BLD-RTO: 0 /100 WBC
OVALOCYTES BLD QL SMEAR: ABNORMAL
PLATELET # BLD AUTO: 226 K/UL (ref 150–450)
PLATELET BLD QL SMEAR: ABNORMAL
PMV BLD AUTO: 9.8 FL (ref 9.2–12.9)
POIKILOCYTOSIS BLD QL SMEAR: ABNORMAL
POLYCHROMASIA BLD QL SMEAR: ABNORMAL
POTASSIUM SERPL-SCNC: 3.5 MMOL/L (ref 3.5–5.1)
PROCALCITONIN SERPL IA-MCNC: 0.16 NG/ML
PROT SERPL-MCNC: 5.7 G/DL (ref 6–8.4)
RBC # BLD AUTO: 3.52 M/UL (ref 4–5.4)
SCHISTOCYTES BLD QL SMEAR: PRESENT
SODIUM SERPL-SCNC: 136 MMOL/L (ref 136–145)
TARGETS BLD QL SMEAR: ABNORMAL
TROPONIN I SERPL DL<=0.01 NG/ML-MCNC: 0.02 NG/ML (ref 0–0.03)
WBC # BLD AUTO: 5.62 K/UL (ref 3.9–12.7)

## 2022-09-25 PROCEDURE — 25000003 PHARM REV CODE 250: Performed by: NURSE PRACTITIONER

## 2022-09-25 PROCEDURE — 93010 EKG 12-LEAD: ICD-10-PCS | Mod: ,,, | Performed by: STUDENT IN AN ORGANIZED HEALTH CARE EDUCATION/TRAINING PROGRAM

## 2022-09-25 PROCEDURE — 25000003 PHARM REV CODE 250: Performed by: STUDENT IN AN ORGANIZED HEALTH CARE EDUCATION/TRAINING PROGRAM

## 2022-09-25 PROCEDURE — 63600175 PHARM REV CODE 636 W HCPCS: Performed by: NURSE PRACTITIONER

## 2022-09-25 PROCEDURE — 99223 1ST HOSP IP/OBS HIGH 75: CPT | Mod: ,,, | Performed by: INTERNAL MEDICINE

## 2022-09-25 PROCEDURE — 93010 ELECTROCARDIOGRAM REPORT: CPT | Mod: ,,, | Performed by: STUDENT IN AN ORGANIZED HEALTH CARE EDUCATION/TRAINING PROGRAM

## 2022-09-25 PROCEDURE — 87040 BLOOD CULTURE FOR BACTERIA: CPT | Mod: 59 | Performed by: INTERNAL MEDICINE

## 2022-09-25 PROCEDURE — 11000001 HC ACUTE MED/SURG PRIVATE ROOM

## 2022-09-25 PROCEDURE — 99233 SBSQ HOSP IP/OBS HIGH 50: CPT | Mod: 25,,, | Performed by: STUDENT IN AN ORGANIZED HEALTH CARE EDUCATION/TRAINING PROGRAM

## 2022-09-25 PROCEDURE — 86140 C-REACTIVE PROTEIN: CPT | Performed by: NURSE PRACTITIONER

## 2022-09-25 PROCEDURE — 83605 ASSAY OF LACTIC ACID: CPT | Performed by: STUDENT IN AN ORGANIZED HEALTH CARE EDUCATION/TRAINING PROGRAM

## 2022-09-25 PROCEDURE — 84145 PROCALCITONIN (PCT): CPT | Performed by: NURSE PRACTITIONER

## 2022-09-25 PROCEDURE — 63600175 PHARM REV CODE 636 W HCPCS: Performed by: STUDENT IN AN ORGANIZED HEALTH CARE EDUCATION/TRAINING PROGRAM

## 2022-09-25 PROCEDURE — 36415 COLL VENOUS BLD VENIPUNCTURE: CPT | Performed by: STUDENT IN AN ORGANIZED HEALTH CARE EDUCATION/TRAINING PROGRAM

## 2022-09-25 PROCEDURE — 87070 CULTURE OTHR SPECIMN AEROBIC: CPT | Performed by: NURSE PRACTITIONER

## 2022-09-25 PROCEDURE — 85025 COMPLETE CBC W/AUTO DIFF WBC: CPT | Performed by: NURSE PRACTITIONER

## 2022-09-25 PROCEDURE — 36415 COLL VENOUS BLD VENIPUNCTURE: CPT | Performed by: NURSE PRACTITIONER

## 2022-09-25 PROCEDURE — 99233 PR SUBSEQUENT HOSPITAL CARE,LEVL III: ICD-10-PCS | Mod: 25,,, | Performed by: STUDENT IN AN ORGANIZED HEALTH CARE EDUCATION/TRAINING PROGRAM

## 2022-09-25 PROCEDURE — 25000003 PHARM REV CODE 250: Performed by: INTERNAL MEDICINE

## 2022-09-25 PROCEDURE — 87205 SMEAR GRAM STAIN: CPT | Performed by: NURSE PRACTITIONER

## 2022-09-25 PROCEDURE — 80053 COMPREHEN METABOLIC PANEL: CPT | Performed by: STUDENT IN AN ORGANIZED HEALTH CARE EDUCATION/TRAINING PROGRAM

## 2022-09-25 PROCEDURE — 93005 ELECTROCARDIOGRAM TRACING: CPT

## 2022-09-25 PROCEDURE — 84484 ASSAY OF TROPONIN QUANT: CPT | Mod: 91 | Performed by: NURSE PRACTITIONER

## 2022-09-25 PROCEDURE — 99223 PR INITIAL HOSPITAL CARE,LEVL III: ICD-10-PCS | Mod: ,,, | Performed by: INTERNAL MEDICINE

## 2022-09-25 PROCEDURE — 21400001 HC TELEMETRY ROOM

## 2022-09-25 RX ORDER — AZITHROMYCIN 250 MG/1
500 TABLET, FILM COATED ORAL DAILY
Status: DISCONTINUED | OUTPATIENT
Start: 2022-09-25 | End: 2022-09-25

## 2022-09-25 RX ORDER — DOXYCYCLINE HYCLATE 100 MG
100 TABLET ORAL EVERY 12 HOURS
Status: DISCONTINUED | OUTPATIENT
Start: 2022-09-25 | End: 2022-09-30 | Stop reason: HOSPADM

## 2022-09-25 RX ORDER — NITROGLYCERIN 0.4 MG/1
0.4 TABLET SUBLINGUAL EVERY 5 MIN PRN
Status: DISCONTINUED | OUTPATIENT
Start: 2022-09-25 | End: 2022-09-30 | Stop reason: HOSPADM

## 2022-09-25 RX ORDER — MORPHINE SULFATE 4 MG/ML
1 INJECTION, SOLUTION INTRAMUSCULAR; INTRAVENOUS EVERY 6 HOURS PRN
Status: DISCONTINUED | OUTPATIENT
Start: 2022-09-25 | End: 2022-09-25

## 2022-09-25 RX ORDER — HYDROCODONE BITARTRATE AND ACETAMINOPHEN 5; 325 MG/1; MG/1
1 TABLET ORAL EVERY 6 HOURS PRN
Status: DISCONTINUED | OUTPATIENT
Start: 2022-09-25 | End: 2022-09-30 | Stop reason: HOSPADM

## 2022-09-25 RX ORDER — METOLAZONE 2.5 MG/1
2.5 TABLET ORAL ONCE
Status: COMPLETED | OUTPATIENT
Start: 2022-09-25 | End: 2022-09-25

## 2022-09-25 RX ORDER — HEPARIN SODIUM,PORCINE/D5W 25000/250
0-40 INTRAVENOUS SOLUTION INTRAVENOUS CONTINUOUS
Status: CANCELLED | OUTPATIENT
Start: 2022-09-25

## 2022-09-25 RX ORDER — POTASSIUM CHLORIDE 20 MEQ/1
40 TABLET, EXTENDED RELEASE ORAL ONCE
Status: COMPLETED | OUTPATIENT
Start: 2022-09-25 | End: 2022-09-25

## 2022-09-25 RX ADMIN — POTASSIUM CHLORIDE 40 MEQ: 1500 TABLET, EXTENDED RELEASE ORAL at 02:09

## 2022-09-25 RX ADMIN — DOXYCYCLINE HYCLATE 100 MG: 100 TABLET, COATED ORAL at 04:09

## 2022-09-25 RX ADMIN — HYDROCODONE BITARTRATE AND ACETAMINOPHEN 1 TABLET: 5; 325 TABLET ORAL at 02:09

## 2022-09-25 RX ADMIN — METOLAZONE 2.5 MG: 2.5 TABLET ORAL at 09:09

## 2022-09-25 RX ADMIN — MORPHINE SULFATE 1 MG: 4 INJECTION INTRAVENOUS at 09:09

## 2022-09-25 RX ADMIN — FUROSEMIDE 40 MG: 10 INJECTION, SOLUTION INTRAVENOUS at 09:09

## 2022-09-25 RX ADMIN — ATORVASTATIN CALCIUM 40 MG: 40 TABLET, FILM COATED ORAL at 09:09

## 2022-09-25 RX ADMIN — DOXYCYCLINE HYCLATE 100 MG: 100 TABLET, COATED ORAL at 09:09

## 2022-09-25 RX ADMIN — CEFTRIAXONE 1 G: 1 INJECTION, SOLUTION INTRAVENOUS at 05:09

## 2022-09-25 NOTE — HPI
37-year-old female patient with past medical history of systolic heart failure due to cocaine abuse, right lower extremity arterial thrombosis status post embolectomy June 2022 [facility], admitted to the hospital for hemoptysis worsening over the last 2 3 months.

## 2022-09-25 NOTE — H&P (VIEW-ONLY)
[facility] S[facility]  Pulmonology  Consult Note    Patient Name: Teresa Echols  MRN: [id number]  Admission Date: 9/22/2022  Hospital Length of Stay: 0 days  Code Status: Full Code  Attending Physician: Srinivas Beard MD  Primary Care Provider: Juan Contreras MD   Principal Problem: Hemoptysis    [unfilled]  Subjective:     HPI:  37-year-old female patient with past medical history of systolic heart failure due to cocaine abuse, right lower extremity arterial thrombosis status post embolectomy June 2022 [facility], admitted to the hospital for hemoptysis worsening over the last 2 3 months.      Past Medical History:   Diagnosis Date    Bipolar disorder, unspecified     CHF (congestive heart failure)     Hypertension        History reviewed. No pertinent surgical history.    Review of patient's allergies indicates:  No Known Allergies    Family History       Problem Relation (Age of Onset)    Hypertension Mother, Father          Tobacco Use    Smoking status: Every Day     Types: Cigarettes    Smokeless tobacco: Never   Substance and Sexual Activity    Alcohol use: Yes    Drug use: Yes     Types: Cocaine    Sexual activity: Not on file         Review of Systems   Constitutional:  Positive for activity change. Negative for chills and fever.   HENT:  Negative for drooling, ear discharge and nosebleeds.    Eyes:  Negative for pain, discharge and itching.   Respiratory:  Positive for cough and shortness of breath. Negative for choking.         Hemoptysis   Cardiovascular:  Positive for leg swelling. Negative for chest pain.   Gastrointestinal:  Negative for anal bleeding.   Endocrine: Negative for cold intolerance.   Genitourinary:  Negative for hematuria.   Musculoskeletal:  Positive for arthralgias and back pain. Negative for neck stiffness.   Skin:  Positive for pallor. Negative for rash.   Allergic/Immunologic: Negative for immunocompromised state.   Neurological:  Positive for weakness. Negative for seizures and  facial asymmetry.   Hematological:  Negative for adenopathy.   Psychiatric/Behavioral:  Negative for behavioral problems, self-injury and suicidal ideas.    Objective:     Vital Signs (Most Recent):  Temp: 97.8 °F (36.6 °C) (09/25/22 1553)  Pulse: 97 (09/25/22 1553)  Resp: 18 (09/25/22 1553)  BP: 103/76 (09/25/22 1553)  SpO2: 100 % (09/25/22 1553) Vital Signs (24h Range):  Temp:  [97.7 °F (36.5 °C)-98.3 °F (36.8 °C)] 97.8 °F (36.6 °C)  Pulse:  [] 97  Resp:  [16-20] 18  SpO2:  [97 %-100 %] 100 %  BP: ()/(65-80) 103/76     Weight: 56.7 kg (125 lb)  Body mass index is 21.46 kg/m².      Intake/Output Summary (Last 24 hours) at 9/25/2022 1622  Last data filed at 9/25/2022 0900  Gross per 24 hour   Intake 692.09 ml   Output 650 ml   Net 42.09 ml       Physical Exam  Vitals and nursing note reviewed.   Constitutional:       General: She is not in acute distress.     Appearance: She is well-developed. She is ill-appearing.   HENT:      Head: Normocephalic and atraumatic.      Nose: Nose normal.   Eyes:      Extraocular Movements: Extraocular movements intact.   Cardiovascular:      Rate and Rhythm: Regular rhythm. Tachycardia present.   Pulmonary:      Effort: Pulmonary effort is normal.      Breath sounds: No stridor.   Abdominal:      General: There is no distension.   Musculoskeletal:         General: No signs of injury.      Cervical back: Normal range of motion and neck supple.   Skin:     General: Skin is dry.   Neurological:      General: No focal deficit present.      Mental Status: She is alert and oriented to person, place, and time.   Psychiatric:         Behavior: Behavior normal.       Vents:       Lines/Drains/Airways       Peripheral Intravenous Line  Duration                  Peripheral IV - Single Lumen 09/24/22 2153 22 G Anterior;Distal;Right Forearm <1 day                    Significant Labs:    CBC/Anemia Profile:  Recent Labs   Lab 09/24/22  0510 09/25/22  0915   WBC 5.50 5.62   HGB 7.8*  7.9*   HCT 26.8* 27.7*    226   MCV 80* 79*   RDW 25.2* 25.5*        Chemistries:  Recent Labs   Lab 09/24/22  0510 09/25/22  0911   * 136   K 3.9 3.5    105   CO2 18* 21*   BUN 22* 16   CREATININE 1.0 0.8   CALCIUM 8.4* 8.1*   ALBUMIN 2.5* 2.4*   PROT 5.7* 5.7*   BILITOT 2.7* 1.9*   ALKPHOS 79 117   ALT 17 13   AST 19 18   MG 1.7  --       Latest Reference Range & Units Most Recent   BNP 0 - 99 pg/mL 3,610 (H)  9/23/22 00:53   Troponin I 0.000 - 0.026 ng/mL 0.021  9/25/22 12:17   (H): Data is abnormally high  EKG 09/25/2022    SinusTachycardia       Vent. Rate : 107 BPM     Atrial Rate : 107 BPM      P-R Int : 138 ms          QRS Dur : 148 ms       QT Int : 400 ms       P-R-T Axes : 068 104 -50 degrees      QTc Int : 534 ms     Sinus tachycardia with occasional Premature ventricular complexes   Possible Left atrial enlargement   Nonspecific intraventricular block   Possible Lateral infarct (cited on or before 23-SEP-2022)   Abnormal ECG   When compared with ECG of 23-SEP-2022 00:22,     Echo 09/23/2022       The left ventricle is severely enlarged with concentric hypertrophy and severely decreased systolic function.  The estimated ejection fraction is 15%.  Severe left atrial enlargement.  Severe right atrial enlargement.  Grade III left ventricular diastolic dysfunction.  The estimated PA systolic pressure is 106 mmHg.  Normal right ventricular size with mildly reduced right ventricular systolic function.  There is pulmonary hypertension.  Elevated central venous pressure (15 mmHg).  Moderate to severe tricuspid regurgitation.  There is severe left ventricular global hypokinesis.  Moderate pulmonic regurgitation.  Moderate mitral regurgitation.  There is a small right pleural effusion.       Significant Imaging:       CT angio of the chest 09/23/2022    COMPARISON:  Chest x-ray dated 09/23/2022     FINDINGS:  -Pulmonary arteries: Pulmonary arteries are well opacified.  No evidence of pulmonary  embolism.  No evidence of pulmonary hypertension.  No right heart strain is identified with RV/LV ratio < 0.9.     -Lungs: Bilateral lower lobe consolidative processes concerning for edema versus multifocal airspace disease or aspiration.  Septal thickening seen within the lungs bilaterally reflecting interstitial edema or interstitial pneumonia.     -Pleura: Trace right pleural effusion.     -Mediastinum/Rosalinda:Shotty adenopathy     -Axilla: No adenopathy.     -Thyroid: Normal lower gland.     -Heart/Aorta: Heart size is enlarged.  Reflux of contrast into the IVC and hepatic veins suggest right heart dysfunction.  No significant coronary artery disease.  Trace pericardial effusion. Aorta normal caliber.   Aorta demonstrates no significant atherosclerotic disease.     -Bones/Chest Wall: Intact     -Upper Abdomen: Hepatomegaly.     Impression:     No evidence of pulmonary embolism.  NO EVIDENCE OF RIGHT HEART STRAIN.  Reflux into the IVC and dilated on the right atrium suggest right heart dysfunction.  Recommend cardiac echo follow-up.     Cardiomegaly.  Bilateral lower lobe consolidative processes concerning for edema versus multifocal airspace disease or aspiration. Septal thickening seen within the lungs bilaterally reflecting interstitial edema or interstitial pneumonia      ABG  No results for input(s): PH, PO2, PCO2, HCO3, BE in the last 168 hours.  Assessment/Plan:     * Hemoptysis  Hold Apixaban .  Collect sputum to quantize hemoptysis.  Send sputum microbiology     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  Off anticoagulation due to hemoptysis.  Treat CHF.  Vascular evaluation.    SOB (shortness of breath)  Target O2 sat 92-94%.  Multifactorial CHF plus suspected bilateral pneumonia.  IV Rocephin doxycycline.  Check procalcitonin CRP.  Send respiratory culture.  Nasal MRSA.  Blood culture.    Cocaine abuse  Cocaine abuse complicated by systolic heart failure and arterial lower extremity thrombosis  status post embolectomy June 2022.    Acute on chronic systolic congestive heart failure  Patient is identified as having Combined Systolic and Diastolic heart failure that is Acute on chronic. CHF is currently uncontrolled due to Rales/crackles on pulmonary exam and Pulmonary edema/pleural effusion on CXR. Latest ECHO performed and demonstrates- Results for orders placed during the hospital encounter of 09/22/22    Echo    Interpretation Summary  · The left ventricle is severely enlarged with concentric hypertrophy and severely decreased systolic function.  · The estimated ejection fraction is 15%.  · Severe left atrial enlargement.  · Severe right atrial enlargement.  · Grade III left ventricular diastolic dysfunction.  · The estimated PA systolic pressure is 106 mmHg.  · Normal right ventricular size with mildly reduced right ventricular systolic function.  · There is pulmonary hypertension.  · Elevated central venous pressure (15 mmHg).  · Moderate to severe tricuspid regurgitation.  · There is severe left ventricular global hypokinesis.  · Moderate pulmonic regurgitation.  · Moderate mitral regurgitation.  · There is a small right pleural effusion.  . Continue Furosemide and monitor clinical status closely. Monitor on telemetry. Patient is on CHF pathway.  Monitor strict Is&Os and daily weights.  Place on fluid restriction of 1.5 L. Continue to stress to patient importance of self efficacy and  on diet for CHF. Last BNP reviewed- and noted below   Recent Labs   Lab 09/23/22  0053   BNP 3,610*   .  Strict I&Os.  IV Lasix.  Check chest x-ray in a.m..  Check nasal MRSA.  Repeat BNP.    Discussed with hospital medicine nurse practitioner and attending       Thank you for your consult. I will follow-up with patient. Please contact us if you have any additional questions.     Alex Lundberg MD  Pulmonology  [facility]

## 2022-09-25 NOTE — CONSULTS
[facility] Surg  Pulmonology  Consult Note    Patient Name: Teresa Echols  MRN: [id number]  Admission Date: 9/22/2022  Hospital Length of Stay: 0 days  Code Status: Full Code  Attending Physician: Srinivas Beard MD  Primary Care Provider: Juan Contreras MD   Principal Problem: Hemoptysis    [unfilled]  Subjective:     HPI:  37-year-old female patient with past medical history of systolic heart failure due to cocaine abuse, right lower extremity arterial thrombosis status post embolectomy June 2022 [facility], admitted to the hospital for hemoptysis worsening over the last 2 3 months.      Past Medical History:   Diagnosis Date    Bipolar disorder, unspecified     CHF (congestive heart failure)     Hypertension        History reviewed. No pertinent surgical history.    Review of patient's allergies indicates:  No Known Allergies    Family History       Problem Relation (Age of Onset)    Hypertension Mother, Father          Tobacco Use    Smoking status: Every Day     Types: Cigarettes    Smokeless tobacco: Never   Substance and Sexual Activity    Alcohol use: Yes    Drug use: Yes     Types: Cocaine    Sexual activity: Not on file         Review of Systems   Constitutional:  Positive for activity change. Negative for chills and fever.   HENT:  Negative for drooling, ear discharge and nosebleeds.    Eyes:  Negative for pain, discharge and itching.   Respiratory:  Positive for cough and shortness of breath. Negative for choking.         Hemoptysis   Cardiovascular:  Positive for leg swelling. Negative for chest pain.   Gastrointestinal:  Negative for anal bleeding.   Endocrine: Negative for cold intolerance.   Genitourinary:  Negative for hematuria.   Musculoskeletal:  Positive for arthralgias and back pain. Negative for neck stiffness.   Skin:  Positive for pallor. Negative for rash.   Allergic/Immunologic: Negative for immunocompromised state.   Neurological:  Positive for weakness. Negative for seizures and  facial asymmetry.   Hematological:  Negative for adenopathy.   Psychiatric/Behavioral:  Negative for behavioral problems, self-injury and suicidal ideas.    Objective:     Vital Signs (Most Recent):  Temp: 97.8 °F (36.6 °C) (09/25/22 1553)  Pulse: 97 (09/25/22 1553)  Resp: 18 (09/25/22 1553)  BP: 103/76 (09/25/22 1553)  SpO2: 100 % (09/25/22 1553) Vital Signs (24h Range):  Temp:  [97.7 °F (36.5 °C)-98.3 °F (36.8 °C)] 97.8 °F (36.6 °C)  Pulse:  [] 97  Resp:  [16-20] 18  SpO2:  [97 %-100 %] 100 %  BP: ()/(65-80) 103/76     Weight: 56.7 kg (125 lb)  Body mass index is 21.46 kg/m².      Intake/Output Summary (Last 24 hours) at 9/25/2022 1622  Last data filed at 9/25/2022 0900  Gross per 24 hour   Intake 692.09 ml   Output 650 ml   Net 42.09 ml       Physical Exam  Vitals and nursing note reviewed.   Constitutional:       General: She is not in acute distress.     Appearance: She is well-developed. She is ill-appearing.   HENT:      Head: Normocephalic and atraumatic.      Nose: Nose normal.   Eyes:      Extraocular Movements: Extraocular movements intact.   Cardiovascular:      Rate and Rhythm: Regular rhythm. Tachycardia present.   Pulmonary:      Effort: Pulmonary effort is normal.      Breath sounds: No stridor.   Abdominal:      General: There is no distension.   Musculoskeletal:         General: No signs of injury.      Cervical back: Normal range of motion and neck supple.   Skin:     General: Skin is dry.   Neurological:      General: No focal deficit present.      Mental Status: She is alert and oriented to person, place, and time.   Psychiatric:         Behavior: Behavior normal.       Vents:       Lines/Drains/Airways       Peripheral Intravenous Line  Duration                  Peripheral IV - Single Lumen 09/24/22 2153 22 G Anterior;Distal;Right Forearm <1 day                    Significant Labs:    CBC/Anemia Profile:  Recent Labs   Lab 09/24/22  0510 09/25/22  0915   WBC 5.50 5.62   HGB 7.8*  7.9*   HCT 26.8* 27.7*    226   MCV 80* 79*   RDW 25.2* 25.5*        Chemistries:  Recent Labs   Lab 09/24/22  0510 09/25/22  0911   * 136   K 3.9 3.5    105   CO2 18* 21*   BUN 22* 16   CREATININE 1.0 0.8   CALCIUM 8.4* 8.1*   ALBUMIN 2.5* 2.4*   PROT 5.7* 5.7*   BILITOT 2.7* 1.9*   ALKPHOS 79 117   ALT 17 13   AST 19 18   MG 1.7  --       Latest Reference Range & Units Most Recent   BNP 0 - 99 pg/mL 3,610 (H)  9/23/22 00:53   Troponin I 0.000 - 0.026 ng/mL 0.021  9/25/22 12:17   (H): Data is abnormally high  EKG 09/25/2022    SinusTachycardia       Vent. Rate : 107 BPM     Atrial Rate : 107 BPM      P-R Int : 138 ms          QRS Dur : 148 ms       QT Int : 400 ms       P-R-T Axes : 068 104 -50 degrees      QTc Int : 534 ms     Sinus tachycardia with occasional Premature ventricular complexes   Possible Left atrial enlargement   Nonspecific intraventricular block   Possible Lateral infarct (cited on or before 23-SEP-2022)   Abnormal ECG   When compared with ECG of 23-SEP-2022 00:22,     Echo 09/23/2022       The left ventricle is severely enlarged with concentric hypertrophy and severely decreased systolic function.  The estimated ejection fraction is 15%.  Severe left atrial enlargement.  Severe right atrial enlargement.  Grade III left ventricular diastolic dysfunction.  The estimated PA systolic pressure is 106 mmHg.  Normal right ventricular size with mildly reduced right ventricular systolic function.  There is pulmonary hypertension.  Elevated central venous pressure (15 mmHg).  Moderate to severe tricuspid regurgitation.  There is severe left ventricular global hypokinesis.  Moderate pulmonic regurgitation.  Moderate mitral regurgitation.  There is a small right pleural effusion.       Significant Imaging:       CT angio of the chest 09/23/2022    COMPARISON:  Chest x-ray dated 09/23/2022     FINDINGS:  -Pulmonary arteries: Pulmonary arteries are well opacified.  No evidence of pulmonary  embolism.  No evidence of pulmonary hypertension.  No right heart strain is identified with RV/LV ratio < 0.9.     -Lungs: Bilateral lower lobe consolidative processes concerning for edema versus multifocal airspace disease or aspiration.  Septal thickening seen within the lungs bilaterally reflecting interstitial edema or interstitial pneumonia.     -Pleura: Trace right pleural effusion.     -Mediastinum/Rosalinda:Shotty adenopathy     -Axilla: No adenopathy.     -Thyroid: Normal lower gland.     -Heart/Aorta: Heart size is enlarged.  Reflux of contrast into the IVC and hepatic veins suggest right heart dysfunction.  No significant coronary artery disease.  Trace pericardial effusion. Aorta normal caliber.   Aorta demonstrates no significant atherosclerotic disease.     -Bones/Chest Wall: Intact     -Upper Abdomen: Hepatomegaly.     Impression:     No evidence of pulmonary embolism.  NO EVIDENCE OF RIGHT HEART STRAIN.  Reflux into the IVC and dilated on the right atrium suggest right heart dysfunction.  Recommend cardiac echo follow-up.     Cardiomegaly.  Bilateral lower lobe consolidative processes concerning for edema versus multifocal airspace disease or aspiration. Septal thickening seen within the lungs bilaterally reflecting interstitial edema or interstitial pneumonia      ABG  No results for input(s): PH, PO2, PCO2, HCO3, BE in the last 168 hours.  Assessment/Plan:     * Hemoptysis  Hold Apixaban .  Collect sputum to quantize hemoptysis.  Send sputum microbiology     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  Off anticoagulation due to hemoptysis.  Treat CHF.  Vascular evaluation.    SOB (shortness of breath)  Target O2 sat 92-94%.  Multifactorial CHF plus suspected bilateral pneumonia.  IV Rocephin doxycycline.  Check procalcitonin CRP.  Send respiratory culture.  Nasal MRSA.  Blood culture.    Cocaine abuse  Cocaine abuse complicated by systolic heart failure and arterial lower extremity thrombosis  status post embolectomy June 2022.    Acute on chronic systolic congestive heart failure  Patient is identified as having Combined Systolic and Diastolic heart failure that is Acute on chronic. CHF is currently uncontrolled due to Rales/crackles on pulmonary exam and Pulmonary edema/pleural effusion on CXR. Latest ECHO performed and demonstrates- Results for orders placed during the hospital encounter of 09/22/22    Echo    Interpretation Summary  · The left ventricle is severely enlarged with concentric hypertrophy and severely decreased systolic function.  · The estimated ejection fraction is 15%.  · Severe left atrial enlargement.  · Severe right atrial enlargement.  · Grade III left ventricular diastolic dysfunction.  · The estimated PA systolic pressure is 106 mmHg.  · Normal right ventricular size with mildly reduced right ventricular systolic function.  · There is pulmonary hypertension.  · Elevated central venous pressure (15 mmHg).  · Moderate to severe tricuspid regurgitation.  · There is severe left ventricular global hypokinesis.  · Moderate pulmonic regurgitation.  · Moderate mitral regurgitation.  · There is a small right pleural effusion.  . Continue Furosemide and monitor clinical status closely. Monitor on telemetry. Patient is on CHF pathway.  Monitor strict Is&Os and daily weights.  Place on fluid restriction of 1.5 L. Continue to stress to patient importance of self efficacy and  on diet for CHF. Last BNP reviewed- and noted below   Recent Labs   Lab 09/23/22  0053   BNP 3,610*   .  Strict I&Os.  IV Lasix.  Check chest x-ray in a.m..  Check nasal MRSA.  Repeat BNP.    Discussed with hospital medicine nurse practitioner and attending       Thank you for your consult. I will follow-up with patient. Please contact us if you have any additional questions.     Alex Lundberg MD  Pulmonology  [facility]

## 2022-09-25 NOTE — ASSESSMENT & PLAN NOTE
-s/p thrombectomy few months ago at [facility]  -Has been on Xarelto  -hold due to hemoptysis    9/24:  Eliquis on hold  CTA negative for PE  US RLE negative for DVT

## 2022-09-25 NOTE — PROGRESS NOTES
[facility]  Progress Note    Patient Name: Teresa Echols  MRN: [id number]  Patient Class: OP- Observation   Admission Date: 9/22/2022  Length of Stay: 0 days  Attending Physician: Irvin Escalante MD  Primary Care Provider: Juan Contreras MD        Subjective:     Principal Problem:Hemoptysis        HPI:  Ms. Echols is a 37-year-old  female with PMH significant for systolic CHF (EF 25% on echo done June 2022), chronic cocaine user, right lower extremity DVT status post thrombectomy on in June), presented to [facility] ED complaining of coughing up blood the past 2-3 months, since the thrombectomy procedure.  Patient on Xarelto.  States that she has been to multiple hospitals for the same issue, was told that there is nothing abnormal and was sent home.  Denies chest pain, but complains of SOB, worsen with exertion.  Denies fever, chills.  /68.  Afebrile.  HR .  SaO2 100% on 2 L O2 N/C (not home oxygen dependent).  Laboratory workup reveals hemoglobin 7.3, MCV 81, INR 1.4.  D-dimer elevated 2.65.  CTA chest negative for PE, but reveals enlarged cardiac silhouette, right heart strain, increased vascular congestion.  BNP elevated 3610.  Troponin 0.024.  UDS pending.  Received Lasix 40 mg IV x1 in the ED.    Admitting diagnosis:  Hemoptysis.  Acute on chronic systolic CHF.  Chronic cocaine user.  History of right leg DVT, status post thrombectomy in June 2022.  On Xarelto.      Overview/Hospital Course:  9/23: 37-year-old female admitted for hemoptysis, upon further investigation patient has blood-tinged sputum not vomitus.  Iron studies pending.  Since stopping Eliquis this hospitalization she has not had any blood-tinged sputum production.  BB resumed and will further diurese.  Monitor H/ H    9/24: Iron studies c/w with anemia, although Hgb improved. Continues to cough up blood tinged sputum which is likely related to CHF and pulm edema. R/C  Monday per Cards. Diurese and monitor strict I&Os. CT chest showed concern for PNA vs edema but no signs of lesion. No leukocytosis, afebrile. Cont treating UTI.    9/25: Episode of chest pain this am. Trops negative. Cont IV diuresis with Lasix. Plan for R/LHC tomorrow. Cards following. Hgb stable and improved to 7.9 despite patient coughing up bloody sputum and as stated above likely from right heart failure and pulm edema..       Interval History: Episode of chest pain this am. Trops negative. Cont IV diuresis with Lasix. Plan for R/LHC tomorrow. Cards following. Hgb stable and improved to 7.9 despite patient coughing up bloody sputum and as stated above likely from right heart failure and pulm edema..     Review of Systems   Constitutional:  Positive for fatigue. Negative for chills and fever.   HENT: Negative.  Negative for congestion, rhinorrhea, sore throat and trouble swallowing.    Eyes: Negative.    Respiratory:  Positive for cough (blood) and shortness of breath. Negative for wheezing.    Cardiovascular:  Negative for chest pain, palpitations and leg swelling.   Gastrointestinal: Negative.  Negative for abdominal pain, diarrhea, nausea and vomiting.   Endocrine: Negative.    Genitourinary: Negative.  Negative for dysuria and flank pain.   Musculoskeletal: Negative.  Negative for back pain.   Skin: Negative.  Negative for rash.   Allergic/Immunologic: Negative.    Neurological: Negative.  Negative for speech difficulty, weakness, numbness and headaches.   Hematological: Negative.    Psychiatric/Behavioral: Negative.  Negative for hallucinations. The patient is not nervous/anxious.    All other systems reviewed and are negative.  Objective:     Vital Signs (Most Recent):  Temp: 97.7 °F (36.5 °C) (09/25/22 1125)  Pulse: 104 (09/25/22 1125)  Resp: 18 (09/25/22 1125)  BP: 103/67 (09/25/22 1125)  SpO2: 100 % (09/25/22 1125) Vital Signs (24h Range):  Temp:  [97.7 °F (36.5 °C)-98.5 °F (36.9 °C)] 97.7 °F (36.5  °C)  Pulse:  [] 104  Resp:  [16-20] 18  SpO2:  [95 %-100 %] 100 %  BP: ()/(65-80) 103/67     Weight: 56.7 kg (125 lb)  Body mass index is 21.46 kg/m².    Intake/Output Summary (Last 24 hours) at 9/25/2022 1140  Last data filed at 9/25/2022 0900  Gross per 24 hour   Intake 872.09 ml   Output 900 ml   Net -27.91 ml      Physical Exam  Vitals and nursing note reviewed.   Constitutional:       General: She is awake. She is not in acute distress.     Appearance: She is ill-appearing.      Interventions: Nasal cannula in place.      Comments: Currently on 2 L O2 N/C   HENT:      Head: Normocephalic and atraumatic.      Mouth/Throat:      Mouth: Mucous membranes are moist.   Eyes:      General: No scleral icterus.     Conjunctiva/sclera: Conjunctivae normal.   Cardiovascular:      Rate and Rhythm: Normal rate and regular rhythm.      Heart sounds: No murmur heard.  Pulmonary:      Effort: Pulmonary effort is normal. No respiratory distress.      Breath sounds: No wheezing, rhonchi or rales.      Comments: Blood tinged sputum  Abdominal:      Palpations: Abdomen is soft.      Tenderness: There is no abdominal tenderness.   Musculoskeletal:         General: No swelling (Trace edema bilateral ankles). Normal range of motion.      Cervical back: Normal range of motion and neck supple.   Skin:     General: Skin is warm.   Neurological:      General: No focal deficit present.      Mental Status: She is alert and oriented to person, place, and time. Mental status is at baseline.   Psychiatric:         Attention and Perception: Attention normal.         Mood and Affect: Affect is flat.         Speech: Speech normal.         Behavior: Behavior is cooperative.       Significant Labs: All pertinent labs within the past 24 hours have been reviewed.    Significant Imaging: I have reviewed all pertinent imaging results/findings within the past 24 hours.      Assessment/Plan:      * Hemoptysis  -holding Eliquis  -BP  stable  -CTA chest negative for PE or masses    9/24:  Cont holding Eliquis  Differentials include CHF-Pulm edema vs GI issue. CTA chest negative for masses  R/LHC on Monday per Cards  Hgb remains stable    9/25:  Hgb stable and improved to 7.9 despite patient coughing up bloody sputum and as stated above likely from right heart failure and pulm edema..     Urinary tract infection with hematuria  UA c/w UTI  IV Rocephin       SOB (shortness of breath)  Resolved       Anemia  Iron studies c/w anemia  Does not warrant transfusion currently      Chronic deep vein thrombosis (DVT) of right lower extremity  -s/p thrombectomy few months ago at [facility]  -Has been on Xarelto  -hold due to hemoptysis    9/24:  Eliquis on hold  CTA negative for PE  US RLE negative for DVT        Tobacco use    Assistance with smoking cessation was offered, including:  []  Medications  [x]  Counseling  []  Printed Information on Smoking Cessation  []  Referral to a Smoking Cessation Program    Patient was counseled regarding smoking for 3-10 minutes.        Non compliance w medication regimen         Cocaine abuse  -per Care Everywhere, has been snorting cocaine since age 18  -UDS pending, hold BB until resulted    9/24:  UDS negative  BB resumed     Acute on chronic systolic congestive heart failure  -EF 25% on echo done in June 2022 at [facility]  -repeat echo here  -Lasix 40 mg IV BID x 2 doses, re-evaluate  -CHF pathway    9/24:  Patient is identified as having Combined Systolic and Diastolic heart failure that is Acute on chronic. CHF is currently uncontrolled due to Rales/crackles on pulmonary exam and Pulmonary edema/pleural effusion on CXR. Latest ECHO performed and demonstrates- Results for orders placed during the hospital encounter of 09/22/22    Echo    Interpretation Summary  · The left ventricle is severely enlarged with concentric hypertrophy and severely decreased systolic function.  · The estimated ejection fraction is 15%.  · Severe  left atrial enlargement.  · Severe right atrial enlargement.  · Grade III left ventricular diastolic dysfunction.  · The estimated PA systolic pressure is 106 mmHg.  · Normal right ventricular size with mildly reduced right ventricular systolic function.  · There is pulmonary hypertension.  · Elevated central venous pressure (15 mmHg).  · Moderate to severe tricuspid regurgitation.  · There is severe left ventricular global hypokinesis.  · Moderate pulmonic regurgitation.  · Moderate mitral regurgitation.  · There is a small right pleural effusion.  . Continue Beta Blocker and Furosemide and monitor clinical status closely. Monitor on telemetry. Patient is on CHF pathway.  Monitor strict Is&Os and daily weights.  Place on fluid restriction of 1.5 L. Continue to stress to patient importance of self efficacy and  on diet for CHF. Last BNP reviewed- and noted below   Recent Labs   Lab 09/23/22 0053   BNP 3,610*     Lasix IV  Strict I&Os  Daily weights  1500ml fluid restriction  2gm Na diet  Cards consult   Plan for R/LHC on Monday 9/25:  Episode of chest pain this am  Trops negative  Cont IV diuresis with Lasix.  Plan for R/LHC tomorrow  Cards following  Hgb stable and improved to 7.9 despite patient coughing up bloody sputum and as stated above likely from right heart failure and pulm edema..           VTE Risk Mitigation (From admission, onward)         Ordered     Place sequential compression device  Until discontinued         09/23/22 0539     IP VTE HIGH RISK PATIENT  Once         09/23/22 0539                Discharge Planning   PEPITO:      Code Status: Full Code   Is the patient medically ready for discharge?:     Reason for patient still in hospital (select all that apply): Patient trending condition, Laboratory test, Treatment and Consult recommendations  Discharge Plan A: Home with family                  Luna Escalante NP  Department of Hospital Medicine   [facility] - Med Surg

## 2022-09-25 NOTE — SUBJECTIVE & OBJECTIVE
Interval History: Episode of chest pain this am. Trops negative. Cont IV diuresis with Lasix. Plan for R/LHC tomorrow. Cards following. Hgb stable and improved to 7.9 despite patient coughing up bloody sputum and as stated above likely from right heart failure and pulm edema..     Review of Systems   Constitutional:  Positive for fatigue. Negative for chills and fever.   HENT: Negative.  Negative for congestion, rhinorrhea, sore throat and trouble swallowing.    Eyes: Negative.    Respiratory:  Positive for cough (blood) and shortness of breath. Negative for wheezing.    Cardiovascular:  Negative for chest pain, palpitations and leg swelling.   Gastrointestinal: Negative.  Negative for abdominal pain, diarrhea, nausea and vomiting.   Endocrine: Negative.    Genitourinary: Negative.  Negative for dysuria and flank pain.   Musculoskeletal: Negative.  Negative for back pain.   Skin: Negative.  Negative for rash.   Allergic/Immunologic: Negative.    Neurological: Negative.  Negative for speech difficulty, weakness, numbness and headaches.   Hematological: Negative.    Psychiatric/Behavioral: Negative.  Negative for hallucinations. The patient is not nervous/anxious.    All other systems reviewed and are negative.  Objective:     Vital Signs (Most Recent):  Temp: 97.7 °F (36.5 °C) (09/25/22 1125)  Pulse: 104 (09/25/22 1125)  Resp: 18 (09/25/22 1125)  BP: 103/67 (09/25/22 1125)  SpO2: 100 % (09/25/22 1125) Vital Signs (24h Range):  Temp:  [97.7 °F (36.5 °C)-98.5 °F (36.9 °C)] 97.7 °F (36.5 °C)  Pulse:  [] 104  Resp:  [16-20] 18  SpO2:  [95 %-100 %] 100 %  BP: ()/(65-80) 103/67     Weight: 56.7 kg (125 lb)  Body mass index is 21.46 kg/m².    Intake/Output Summary (Last 24 hours) at 9/25/2022 1140  Last data filed at 9/25/2022 0900  Gross per 24 hour   Intake 872.09 ml   Output 900 ml   Net -27.91 ml      Physical Exam  Vitals and nursing note reviewed.   Constitutional:       General: She is awake. She is not  in acute distress.     Appearance: She is ill-appearing.      Interventions: Nasal cannula in place.      Comments: Currently on 2 L O2 N/C   HENT:      Head: Normocephalic and atraumatic.      Mouth/Throat:      Mouth: Mucous membranes are moist.   Eyes:      General: No scleral icterus.     Conjunctiva/sclera: Conjunctivae normal.   Cardiovascular:      Rate and Rhythm: Normal rate and regular rhythm.      Heart sounds: No murmur heard.  Pulmonary:      Effort: Pulmonary effort is normal. No respiratory distress.      Breath sounds: No wheezing, rhonchi or rales.      Comments: Blood tinged sputum  Abdominal:      Palpations: Abdomen is soft.      Tenderness: There is no abdominal tenderness.   Musculoskeletal:         General: No swelling (Trace edema bilateral ankles). Normal range of motion.      Cervical back: Normal range of motion and neck supple.   Skin:     General: Skin is warm.   Neurological:      General: No focal deficit present.      Mental Status: She is alert and oriented to person, place, and time. Mental status is at baseline.   Psychiatric:         Attention and Perception: Attention normal.         Mood and Affect: Affect is flat.         Speech: Speech normal.         Behavior: Behavior is cooperative.       Significant Labs: All pertinent labs within the past 24 hours have been reviewed.    Significant Imaging: I have reviewed all pertinent imaging results/findings within the past 24 hours.

## 2022-09-25 NOTE — HOSPITAL COURSE
"Ms. Echols is a 37 year old female patient whose current medical conditions include chronic systolic CHF (25% per echo 6/22 in Care Everywhere), history of cocaine abuse, RLE DVT s/p thrombectomy in 6/22, [place]AL[place], and non-compliance who presented to [facility] ED yesterday with a chief complaint of worsening SOB over the past few days. Associated symptoms included hemoptysis, BLE edema, abdominal bloating, and orthopnea. She denied any associated fever, chills, palpitations, near syncope, or syncope. Initial workup in ED revealed anemia (H/H 7.3/25.8), D-dimer +, and BNP of 3610. CTA negative for PE but did show findings consistent with vascular congestion and right heart strain and patient was subsequently admitted for further evaluation and treatment. Cardiology consulted to assist with management. Patient seen and examined today, resting in bed. Remains SOB. Denies osvaldo chest pain but does admit to some left upper chest "tingling". States she has been having issues with SOB/fluid overload ever since her thrombectomy/hospital admission in June. She claims she has been compliant with her medications, but has been using Lasix prn and taking Eliquis only once daily. Admits to eating 2 bags of potato chips prior to admission. Chart reviewed. Initial troponin negative. Repeat echo pending.      9/24/22- doing well.  Abdomen still.  Reports good urine output.  Lasix has been held.  Patient hypotensive.  Blood thinner stopped due to hemoptysis.  Repeat echo with EF 15%, mod-severe TR, RVSP 106 mmhg    9/25/22- continues to have abdominal distension.  Continues to have hemoptysis, stable Hgb.  Was not started on OAC during admission.  Lactate 2.2, upper limit of normal.  C/o left sided pain. K borderline. BB stopped due to hypotension. Does not feel it she urinating a lot.    9/26/22-Patient seen and examined today, sitting up in bed. Feels ok. SOB slowly improving. Still with abdominal bloating as well as hemoptysis. Pulmonary " on board. Labs reviewed. H/H stable. BNP remains elevated.     9/27/22-Patient seen and examined today, resting in bed. SOB and hemoptysis slowly improving. CXR yesterday concerning for infiltrate, remains on abx. Pulmonary following. Labs reviewed. May need IVC filter.    9/28/22-Patient seen and examined today, sitting up in bed. Still with hemoptysis, although reports improvement. Diuresed well overnight with addition of midodrine. IVC filter planned today. Labs relatively stable.    09/29  IVC filter placed and resumed elquis 2.5 mg bid per pulm rec.     9/30/22-Patient seen and examined today, sitting up in bed. Feels ok. States she feels winded at times and complains of abdominal bloating. Still with hemoptysis. Labs reviewed. H/H 7.2/26.6. BMP, Mg pending. LifeVest ordered for SCD prevention.

## 2022-09-25 NOTE — SUBJECTIVE & OBJECTIVE
Past Medical History:   Diagnosis Date    Bipolar disorder, unspecified     CHF (congestive heart failure)     Hypertension        History reviewed. No pertinent surgical history.    Review of patient's allergies indicates:  No Known Allergies    Family History       Problem Relation (Age of Onset)    Hypertension Mother, Father          Tobacco Use    Smoking status: Every Day     Types: Cigarettes    Smokeless tobacco: Never   Substance and Sexual Activity    Alcohol use: Yes    Drug use: Yes     Types: Cocaine    Sexual activity: Not on file         Review of Systems   Constitutional:  Positive for activity change. Negative for chills and fever.   HENT:  Negative for drooling, ear discharge and nosebleeds.    Eyes:  Negative for pain, discharge and itching.   Respiratory:  Positive for cough and shortness of breath. Negative for choking.         Hemoptysis   Cardiovascular:  Positive for leg swelling. Negative for chest pain.   Gastrointestinal:  Negative for anal bleeding.   Endocrine: Negative for cold intolerance.   Genitourinary:  Negative for hematuria.   Musculoskeletal:  Positive for arthralgias and back pain. Negative for neck stiffness.   Skin:  Positive for pallor. Negative for rash.   Allergic/Immunologic: Negative for immunocompromised state.   Neurological:  Positive for weakness. Negative for seizures and facial asymmetry.   Hematological:  Negative for adenopathy.   Psychiatric/Behavioral:  Negative for behavioral problems, self-injury and suicidal ideas.    Objective:     Vital Signs (Most Recent):  Temp: 97.8 °F (36.6 °C) (09/25/22 1553)  Pulse: 97 (09/25/22 1553)  Resp: 18 (09/25/22 1553)  BP: 103/76 (09/25/22 1553)  SpO2: 100 % (09/25/22 1553) Vital Signs (24h Range):  Temp:  [97.7 °F (36.5 °C)-98.3 °F (36.8 °C)] 97.8 °F (36.6 °C)  Pulse:  [] 97  Resp:  [16-20] 18  SpO2:  [97 %-100 %] 100 %  BP: ()/(65-80) 103/76     Weight: 56.7 kg (125 lb)  Body mass index is 21.46  kg/m².      Intake/Output Summary (Last 24 hours) at 9/25/2022 1622  Last data filed at 9/25/2022 0900  Gross per 24 hour   Intake 692.09 ml   Output 650 ml   Net 42.09 ml       Physical Exam  Vitals and nursing note reviewed.   Constitutional:       General: She is not in acute distress.     Appearance: She is well-developed. She is ill-appearing.   HENT:      Head: Normocephalic and atraumatic.      Nose: Nose normal.   Eyes:      Extraocular Movements: Extraocular movements intact.   Cardiovascular:      Rate and Rhythm: Regular rhythm. Tachycardia present.   Pulmonary:      Effort: Pulmonary effort is normal.      Breath sounds: No stridor.   Abdominal:      General: There is no distension.   Musculoskeletal:         General: No signs of injury.      Cervical back: Normal range of motion and neck supple.   Skin:     General: Skin is dry.   Neurological:      General: No focal deficit present.      Mental Status: She is alert and oriented to person, place, and time.   Psychiatric:         Behavior: Behavior normal.       Vents:       Lines/Drains/Airways       Peripheral Intravenous Line  Duration                  Peripheral IV - Single Lumen 09/24/22 2153 22 G Anterior;Distal;Right Forearm <1 day                    Significant Labs:    CBC/Anemia Profile:  Recent Labs   Lab 09/24/22  0510 09/25/22  0915   WBC 5.50 5.62   HGB 7.8* 7.9*   HCT 26.8* 27.7*    226   MCV 80* 79*   RDW 25.2* 25.5*        Chemistries:  Recent Labs   Lab 09/24/22  0510 09/25/22  0911   * 136   K 3.9 3.5    105   CO2 18* 21*   BUN 22* 16   CREATININE 1.0 0.8   CALCIUM 8.4* 8.1*   ALBUMIN 2.5* 2.4*   PROT 5.7* 5.7*   BILITOT 2.7* 1.9*   ALKPHOS 79 117   ALT 17 13   AST 19 18   MG 1.7  --       Latest Reference Range & Units Most Recent   BNP 0 - 99 pg/mL 3,610 (H)  9/23/22 00:53   Troponin I 0.000 - 0.026 ng/mL 0.021  9/25/22 12:17   (H): Data is abnormally high  EKG 09/25/2022    SinusTachycardia       Vent. Rate : 107  BPM     Atrial Rate : 107 BPM      P-R Int : 138 ms          QRS Dur : 148 ms       QT Int : 400 ms       P-R-T Axes : 068 104 -50 degrees      QTc Int : 534 ms     Sinus tachycardia with occasional Premature ventricular complexes   Possible Left atrial enlargement   Nonspecific intraventricular block   Possible Lateral infarct (cited on or before 23-SEP-2022)   Abnormal ECG   When compared with ECG of 23-SEP-2022 00:22,     Echo 09/23/2022       The left ventricle is severely enlarged with concentric hypertrophy and severely decreased systolic function.  The estimated ejection fraction is 15%.  Severe left atrial enlargement.  Severe right atrial enlargement.  Grade III left ventricular diastolic dysfunction.  The estimated PA systolic pressure is 106 mmHg.  Normal right ventricular size with mildly reduced right ventricular systolic function.  There is pulmonary hypertension.  Elevated central venous pressure (15 mmHg).  Moderate to severe tricuspid regurgitation.  There is severe left ventricular global hypokinesis.  Moderate pulmonic regurgitation.  Moderate mitral regurgitation.  There is a small right pleural effusion.       Significant Imaging:       CT angio of the chest 09/23/2022    COMPARISON:  Chest x-ray dated 09/23/2022     FINDINGS:  -Pulmonary arteries: Pulmonary arteries are well opacified.  No evidence of pulmonary embolism.  No evidence of pulmonary hypertension.  No right heart strain is identified with RV/LV ratio < 0.9.     -Lungs: Bilateral lower lobe consolidative processes concerning for edema versus multifocal airspace disease or aspiration.  Septal thickening seen within the lungs bilaterally reflecting interstitial edema or interstitial pneumonia.     -Pleura: Trace right pleural effusion.     -Mediastinum/Rosalinda:Shotty adenopathy     -Axilla: No adenopathy.     -Thyroid: Normal lower gland.     -Heart/Aorta: Heart size is enlarged.  Reflux of contrast into the IVC and hepatic veins suggest  right heart dysfunction.  No significant coronary artery disease.  Trace pericardial effusion. Aorta normal caliber.   Aorta demonstrates no significant atherosclerotic disease.     -Bones/Chest Wall: Intact     -Upper Abdomen: Hepatomegaly.     Impression:     No evidence of pulmonary embolism.  NO EVIDENCE OF RIGHT HEART STRAIN.  Reflux into the IVC and dilated on the right atrium suggest right heart dysfunction.  Recommend cardiac echo follow-up.     Cardiomegaly.  Bilateral lower lobe consolidative processes concerning for edema versus multifocal airspace disease or aspiration. Septal thickening seen within the lungs bilaterally reflecting interstitial edema or interstitial pneumonia

## 2022-09-25 NOTE — ASSESSMENT & PLAN NOTE
-Likely in setting of fluid overload  -Resume AC as tolerated (patient has Eliquis on home med list, but says it's prescribed as only once a day)

## 2022-09-25 NOTE — SUBJECTIVE & OBJECTIVE
Past Medical History:   Diagnosis Date    Bipolar disorder, unspecified     CHF (congestive heart failure)     Hypertension        History reviewed. No pertinent surgical history.    Review of patient's allergies indicates:  No Known Allergies    No current facility-administered medications on file prior to encounter.     Current Outpatient Medications on File Prior to Encounter   Medication Sig    metoprolol succinate (TOPROL-XL) 25 MG 24 hr tablet Take 1 tablet by mouth once daily.    potassium chloride SA (K-DUR,KLOR-CON M) 10 MEQ tablet Take 1 tablet by mouth once daily.    apixaban (ELIQUIS) 5 mg Tab Take 5 mg by mouth.    aspirin (ECOTRIN) 81 MG EC tablet Take 81 mg by mouth once daily.    atorvastatin (LIPITOR) 40 MG tablet Take 40 mg by mouth once daily.    benzonatate (TESSALON) 200 MG capsule Take by mouth.    carvediloL (COREG) 6.25 MG tablet Take 6.25 mg by mouth 2 (two) times daily.    ferrous sulfate 324 mg (65 mg iron) TbEC Take by mouth once daily.    furosemide (LASIX) 40 MG tablet Take 40 mg by mouth once daily.    JARDIANCE 10 mg tablet Take 10 mg by mouth once daily.    QUEtiapine (SEROQUEL) 100 MG Tab Take 100 mg by mouth every evening.     Family History       Problem Relation (Age of Onset)    Hypertension Mother, Father          Tobacco Use    Smoking status: Every Day     Types: Cigarettes    Smokeless tobacco: Never   Substance and Sexual Activity    Alcohol use: Yes    Drug use: Yes     Types: Cocaine    Sexual activity: Not on file     Review of Systems   Constitutional: Negative for diaphoresis, malaise/fatigue, weight gain and weight loss.   HENT:  Negative for congestion and nosebleeds.    Cardiovascular:  Negative for chest pain, claudication, cyanosis, dyspnea on exertion, irregular heartbeat, leg swelling, near-syncope, orthopnea, palpitations, paroxysmal nocturnal dyspnea and syncope.   Respiratory:  Positive for hemoptysis. Negative for cough, shortness of breath, sleep  disturbances due to breathing, snoring, sputum production and wheezing.    Hematologic/Lymphatic: Negative for bleeding problem. Does not bruise/bleed easily.   Skin:  Negative for rash.   Musculoskeletal:  Negative for arthritis, back pain, falls, joint pain, muscle cramps and muscle weakness.   Gastrointestinal:  Positive for bloating. Negative for abdominal pain, constipation, diarrhea, heartburn, hematemesis, hematochezia, melena, nausea and vomiting.   Genitourinary:  Negative for dysuria, hematuria and nocturia.   Neurological:  Negative for excessive daytime sleepiness, dizziness, headaches, light-headedness, loss of balance, numbness, vertigo and weakness.   Objective:     Vital Signs (Most Recent):  Temp: 98.3 °F (36.8 °C) (09/24/22 1937)  Pulse: 100 (09/24/22 1937)  Resp: 16 (09/24/22 1937)  BP: 101/80 (09/24/22 1937)  SpO2: 100 % (09/24/22 1937) Vital Signs (24h Range):  Temp:  [97.2 °F (36.2 °C)-98.5 °F (36.9 °C)] 98.3 °F (36.8 °C)  Pulse:  [] 100  Resp:  [16-19] 16  SpO2:  [92 %-100 %] 100 %  BP: ()/(63-80) 101/80     Weight: 56.7 kg (125 lb)  Body mass index is 21.46 kg/m².    SpO2: 100 %  O2 Device (Oxygen Therapy): room air      Intake/Output Summary (Last 24 hours) at 9/24/2022 2020  Last data filed at 9/24/2022 1400  Gross per 24 hour   Intake 300 ml   Output 450 ml   Net -150 ml       Lines/Drains/Airways       Peripheral Intravenous Line  Duration                  Peripheral IV - Single Lumen 09/23/22 0050 20 G Right Antecubital 1 day                    Physical Exam  Vitals and nursing note reviewed.   Constitutional:       Appearance: Normal appearance. She is well-developed.   HENT:      Head: Normocephalic.      Mouth/Throat:      Mouth: Mucous membranes are moist.   Neck:      Vascular: No carotid bruit or JVD.   Cardiovascular:      Rate and Rhythm: Normal rate and regular rhythm.      Pulses: Normal pulses.      Heart sounds: Normal heart sounds. No murmur heard.    No friction  rub.      Comments: +JVD  Pulmonary:      Effort: Pulmonary effort is normal. No respiratory distress.      Breath sounds: Normal breath sounds. No wheezing or rales.   Abdominal:      General: Bowel sounds are normal. There is distension.      Palpations: Abdomen is soft.      Tenderness: There is no abdominal tenderness. There is no guarding.   Musculoskeletal:         General: No swelling or tenderness.      Cervical back: Neck supple. No tenderness.      Right lower leg: Edema present.      Left lower leg: No edema.   Skin:     General: Skin is warm and dry.      Capillary Refill: Capillary refill takes less than 2 seconds.      Findings: No rash.   Neurological:      General: No focal deficit present.      Mental Status: She is alert and oriented to person, place, and time.   Psychiatric:         Mood and Affect: Mood normal.         Behavior: Behavior normal.         Thought Content: Thought content normal.       Significant Labs: BMP:   Recent Labs   Lab 09/23/22 0053 09/24/22  0510   GLU 74 91    134*   K 4.2 3.9    102   CO2 18* 18*   BUN 16 22*   CREATININE 0.9 1.0   CALCIUM 8.6* 8.4*   MG 1.8 1.7   , CMP   Recent Labs   Lab 09/23/22 0053 09/24/22  0510    134*   K 4.2 3.9    102   CO2 18* 18*   GLU 74 91   BUN 16 22*   CREATININE 0.9 1.0   CALCIUM 8.6* 8.4*   PROT 6.6 5.7*   ALBUMIN 2.8* 2.5*   BILITOT 3.0* 2.7*   ALKPHOS 75 79   AST 22 19   ALT 17 17   ANIONGAP 13 14   , CBC   Recent Labs   Lab 09/23/22 0053 09/24/22  0510   WBC 5.87 5.50   HGB 7.3* 7.8*   HCT 25.8* 26.8*    237   , Lipid Panel No results for input(s): CHOL, HDL, LDLCALC, TRIG, CHOLHDL in the last 48 hours., and Troponin   Recent Labs   Lab 09/23/22 0053   TROPONINI 0.024       Significant Imaging: Echocardiogram: 2D echo with color flow doppler: No results found for this or any previous visit. and Transthoracic echo (TTE) complete (Cupid Only):   Results for orders placed or performed during the  hospital encounter of 09/22/22   Echo   Result Value Ref Range    BSA 1.6 m2    TDI SEPTAL 0.04 m/s    LV LATERAL E/E' RATIO 14.57 m/s    LV SEPTAL E/E' RATIO 25.50 m/s    LA WIDTH 4.80 cm    IVC diameter 2.77 cm    Left Ventricular Outflow Tract Mean Velocity 0.24 cm/s    Left Ventricular Outflow Tract Mean Gradient 0.25 mmHg    TDI LATERAL 0.07 m/s    LVIDd 6.41 (A) 3.5 - 6.0 cm    IVS 1.34 (A) 0.6 - 1.1 cm    Posterior Wall 1.43 (A) 0.6 - 1.1 cm    LVIDs 6.05 (A) 2.1 - 4.0 cm    FS 6 28 - 44 %    LA volume 116.45 cm3    Sinus 2.66 cm    STJ 2.47 cm    Ascending aorta 2.54 cm    LV mass 425.12 g    LA size 4.46 cm    TAPSE 1.10 cm    Left Ventricle Relative Wall Thickness 0.45 cm    AV mean gradient 1 mmHg    AV valve area 1.05 cm2    AV Velocity Ratio 0.41     AV index (prosthetic) 0.35     MV valve area p 1/2 method 4.37 cm2    E/A ratio 3.52     Mean e' 0.06 m/s    E wave deceleration time 173.52 msec    IVRT 51.38 msec    LVOT diameter 1.95 cm    LVOT area 3.0 cm2    LVOT peak marcus 0.32 m/s    LVOT peak VTI 3.60 cm    Ao peak marcus 0.78 m/s    Ao VTI 10.2 cm    Mr max marcus 4.80 m/s    LVOT stroke volume 10.75 cm3    AV peak gradient 2 mmHg    E/E' ratio 18.55 m/s    MV Peak E Marcus 1.02 m/s    TR Max Marcus 4.77 m/s    MV stenosis pressure 1/2 time 50.32 ms    MV Peak A Marcus 0.29 m/s    LV Systolic Volume 183.41 mL    LV Systolic Volume Index 114.6 mL/m2    LV Diastolic Volume 209.62 mL    LV Diastolic Volume Index 131.01 mL/m2    LA Volume Index 72.8 mL/m2    LV Mass Index 266 g/m2    RA Major Axis 6.43 cm    Left Atrium Minor Axis 6.44 cm    Left Atrium Major Axis 6.36 cm    Triscuspid Valve Regurgitation Peak Gradient 91 mmHg    RA Width 5.22 cm    Right Atrial Pressure (from IVC) 15 mmHg    EF 15 %    TV rest pulmonary artery pressure 106 mmHg    Narrative    · The left ventricle is severely enlarged with concentric hypertrophy and   severely decreased systolic function.  · The estimated ejection fraction is 15%.  ·  Severe left atrial enlargement.  · Severe right atrial enlargement.  · Grade III left ventricular diastolic dysfunction.  · The estimated PA systolic pressure is 106 mmHg.  · Normal right ventricular size with mildly reduced right ventricular   systolic function.  · There is pulmonary hypertension.  · Elevated central venous pressure (15 mmHg).  · Moderate to severe tricuspid regurgitation.  · There is severe left ventricular global hypokinesis.  · Moderate pulmonic regurgitation.  · Moderate mitral regurgitation.  · There is a small right pleural effusion.

## 2022-09-25 NOTE — ASSESSMENT & PLAN NOTE
-EF 25% on echo done in June 2022 at [facility]  -repeat echo here  -Lasix 40 mg IV BID x 2 doses, re-evaluate  -CHF pathway    9/24:  Patient is identified as having Combined Systolic and Diastolic heart failure that is Acute on chronic. CHF is currently uncontrolled due to Rales/crackles on pulmonary exam and Pulmonary edema/pleural effusion on CXR. Latest ECHO performed and demonstrates- Results for orders placed during the hospital encounter of 09/22/22    Echo    Interpretation Summary  · The left ventricle is severely enlarged with concentric hypertrophy and severely decreased systolic function.  · The estimated ejection fraction is 15%.  · Severe left atrial enlargement.  · Severe right atrial enlargement.  · Grade III left ventricular diastolic dysfunction.  · The estimated PA systolic pressure is 106 mmHg.  · Normal right ventricular size with mildly reduced right ventricular systolic function.  · There is pulmonary hypertension.  · Elevated central venous pressure (15 mmHg).  · Moderate to severe tricuspid regurgitation.  · There is severe left ventricular global hypokinesis.  · Moderate pulmonic regurgitation.  · Moderate mitral regurgitation.  · There is a small right pleural effusion.  . Continue Beta Blocker and Furosemide and monitor clinical status closely. Monitor on telemetry. Patient is on CHF pathway.  Monitor strict Is&Os and daily weights.  Place on fluid restriction of 1.5 L. Continue to stress to patient importance of self efficacy and  on diet for CHF. Last BNP reviewed- and noted below   Recent Labs   Lab 09/23/22  0053   BNP 3,610*     Lasix IV  Strict I&Os  Daily weights  1500ml fluid restriction  2gm Na diet  Cards consult   Plan for R/LHC on Monday 9/25:  Episode of chest pain this am  Trops negative  Cont IV diuresis with Lasix.  Plan for R/LHC tomorrow  Cards following  Hgb stable and improved to 7.9 despite patient coughing up bloody sputum and as stated above likely from right  heart failure and pulm edema..

## 2022-09-25 NOTE — ASSESSMENT & PLAN NOTE
Target O2 sat 92-94%.  Multifactorial CHF plus suspected bilateral pneumonia.  IV Rocephin doxycycline.  Check procalcitonin CRP.  Send respiratory culture.  Nasal MRSA.  Blood culture.

## 2022-09-25 NOTE — PROGRESS NOTES
"[facility]  Cardiology  Progress Note    Patient Name: Teresa Echols  MRN: [id number]  Admission Date: 9/22/2022  Hospital Length of Stay: 0 days  Code Status: Full Code   Attending Physician: Irvin Escalante MD   Primary Care Physician: Juan Contreras MD  Expected Discharge Date:   Principal Problem:Hemoptysis    Subjective:     Hospital Course:   Ms. Echols is a 37 year old female patient whose current medical conditions include chronic systolic CHF (25% per echo 6/22 in Care Everywhere), history of cocaine abuse, RLE DVT s/p thrombectomy in 6/22, CHALINO, and non-compliance who presented to [facility] ED yesterday with a chief complaint of worsening SOB over the past few days. Associated symptoms included hemoptysis, BLE edema, abdominal bloating, and orthopnea. She denied any associated fever, chills, palpitations, near syncope, or syncope. Initial workup in ED revealed anemia (H/H 7.3/25.8), D-dimer +, and BNP of 3610. CTA negative for PE but did show findings consistent with vascular congestion and right heart strain and patient was subsequently admitted for further evaluation and treatment. Cardiology consulted to assist with management. Patient seen and examined today, resting in bed. Remains SOB. Denies osvaldo chest pain but does admit to some left upper chest "tingling". States she has been having issues with SOB/fluid overload ever since her thrombectomy/hospital admission in June. She claims she has been compliant with her medications, but has been using Lasix prn and taking Eliquis only once daily. Admits to eating 2 bags of potato chips prior to admission. Chart reviewed. Initial troponin negative. Repeat echo pending.      9/24/22- doing well.  Abdomen still.  Reports good urine output.  Lasix has been held.  Patient hypotensive.  Blood thinner stopped due to hemoptysis.  Repeat echo with EF 15%, mod-severe TR, RVSP 106 mmhg      Past Medical History:   Diagnosis Date    Bipolar disorder, unspecified "     CHF (congestive heart failure)     Hypertension        History reviewed. No pertinent surgical history.    Review of patient's allergies indicates:  No Known Allergies    No current facility-administered medications on file prior to encounter.     Current Outpatient Medications on File Prior to Encounter   Medication Sig    metoprolol succinate (TOPROL-XL) 25 MG 24 hr tablet Take 1 tablet by mouth once daily.    potassium chloride SA (K-DUR,KLOR-CON M) 10 MEQ tablet Take 1 tablet by mouth once daily.    apixaban (ELIQUIS) 5 mg Tab Take 5 mg by mouth.    aspirin (ECOTRIN) 81 MG EC tablet Take 81 mg by mouth once daily.    atorvastatin (LIPITOR) 40 MG tablet Take 40 mg by mouth once daily.    benzonatate (TESSALON) 200 MG capsule Take by mouth.    carvediloL (COREG) 6.25 MG tablet Take 6.25 mg by mouth 2 (two) times daily.    ferrous sulfate 324 mg (65 mg iron) TbEC Take by mouth once daily.    furosemide (LASIX) 40 MG tablet Take 40 mg by mouth once daily.    JARDIANCE 10 mg tablet Take 10 mg by mouth once daily.    QUEtiapine (SEROQUEL) 100 MG Tab Take 100 mg by mouth every evening.     Family History       Problem Relation (Age of Onset)    Hypertension Mother, Father          Tobacco Use    Smoking status: Every Day     Types: Cigarettes    Smokeless tobacco: Never   Substance and Sexual Activity    Alcohol use: Yes    Drug use: Yes     Types: Cocaine    Sexual activity: Not on file     Review of Systems   Constitutional: Negative for diaphoresis, malaise/fatigue, weight gain and weight loss.   HENT:  Negative for congestion and nosebleeds.    Cardiovascular:  Negative for chest pain, claudication, cyanosis, dyspnea on exertion, irregular heartbeat, leg swelling, near-syncope, orthopnea, palpitations, paroxysmal nocturnal dyspnea and syncope.   Respiratory:  Positive for hemoptysis. Negative for cough, shortness of breath, sleep disturbances due to breathing, snoring, sputum production and  wheezing.    Hematologic/Lymphatic: Negative for bleeding problem. Does not bruise/bleed easily.   Skin:  Negative for rash.   Musculoskeletal:  Negative for arthritis, back pain, falls, joint pain, muscle cramps and muscle weakness.   Gastrointestinal:  Positive for bloating. Negative for abdominal pain, constipation, diarrhea, heartburn, hematemesis, hematochezia, melena, nausea and vomiting.   Genitourinary:  Negative for dysuria, hematuria and nocturia.   Neurological:  Negative for excessive daytime sleepiness, dizziness, headaches, light-headedness, loss of balance, numbness, vertigo and weakness.   Objective:     Vital Signs (Most Recent):  Temp: 98.3 °F (36.8 °C) (09/24/22 1937)  Pulse: 100 (09/24/22 1937)  Resp: 16 (09/24/22 1937)  BP: 101/80 (09/24/22 1937)  SpO2: 100 % (09/24/22 1937) Vital Signs (24h Range):  Temp:  [97.2 °F (36.2 °C)-98.5 °F (36.9 °C)] 98.3 °F (36.8 °C)  Pulse:  [] 100  Resp:  [16-19] 16  SpO2:  [92 %-100 %] 100 %  BP: ()/(63-80) 101/80     Weight: 56.7 kg (125 lb)  Body mass index is 21.46 kg/m².    SpO2: 100 %  O2 Device (Oxygen Therapy): room air      Intake/Output Summary (Last 24 hours) at 9/24/2022 2020  Last data filed at 9/24/2022 1400  Gross per 24 hour   Intake 300 ml   Output 450 ml   Net -150 ml       Lines/Drains/Airways       Peripheral Intravenous Line  Duration                  Peripheral IV - Single Lumen 09/23/22 0050 20 G Right Antecubital 1 day                    Physical Exam  Vitals and nursing note reviewed.   Constitutional:       Appearance: Normal appearance. She is well-developed.   HENT:      Head: Normocephalic.      Mouth/Throat:      Mouth: Mucous membranes are moist.   Neck:      Vascular: No carotid bruit or JVD.   Cardiovascular:      Rate and Rhythm: Normal rate and regular rhythm.      Pulses: Normal pulses.      Heart sounds: Normal heart sounds. No murmur heard.    No friction rub.      Comments: +JVD  Pulmonary:      Effort: Pulmonary  effort is normal. No respiratory distress.      Breath sounds: Normal breath sounds. No wheezing or rales.   Abdominal:      General: Bowel sounds are normal. There is distension.      Palpations: Abdomen is soft.      Tenderness: There is no abdominal tenderness. There is no guarding.   Musculoskeletal:         General: No swelling or tenderness.      Cervical back: Neck supple. No tenderness.      Right lower leg: Edema present.      Left lower leg: No edema.   Skin:     General: Skin is warm and dry.      Capillary Refill: Capillary refill takes less than 2 seconds.      Findings: No rash.   Neurological:      General: No focal deficit present.      Mental Status: She is alert and oriented to person, place, and time.   Psychiatric:         Mood and Affect: Mood normal.         Behavior: Behavior normal.         Thought Content: Thought content normal.       Significant Labs: BMP:   Recent Labs   Lab 09/23/22 0053 09/24/22  0510   GLU 74 91    134*   K 4.2 3.9    102   CO2 18* 18*   BUN 16 22*   CREATININE 0.9 1.0   CALCIUM 8.6* 8.4*   MG 1.8 1.7   , CMP   Recent Labs   Lab 09/23/22 0053 09/24/22  0510    134*   K 4.2 3.9    102   CO2 18* 18*   GLU 74 91   BUN 16 22*   CREATININE 0.9 1.0   CALCIUM 8.6* 8.4*   PROT 6.6 5.7*   ALBUMIN 2.8* 2.5*   BILITOT 3.0* 2.7*   ALKPHOS 75 79   AST 22 19   ALT 17 17   ANIONGAP 13 14   , CBC   Recent Labs   Lab 09/23/22 0053 09/24/22  0510   WBC 5.87 5.50   HGB 7.3* 7.8*   HCT 25.8* 26.8*    237   , Lipid Panel No results for input(s): CHOL, HDL, LDLCALC, TRIG, CHOLHDL in the last 48 hours., and Troponin   Recent Labs   Lab 09/23/22 0053   TROPONINI 0.024       Significant Imaging: Echocardiogram: 2D echo with color flow doppler: No results found for this or any previous visit. and Transthoracic echo (TTE) complete (Cupid Only):   Results for orders placed or performed during the hospital encounter of 09/22/22   Echo   Result Value Ref Range     BSA 1.6 m2    TDI SEPTAL 0.04 m/s    LV LATERAL E/E' RATIO 14.57 m/s    LV SEPTAL E/E' RATIO 25.50 m/s    LA WIDTH 4.80 cm    IVC diameter 2.77 cm    Left Ventricular Outflow Tract Mean Velocity 0.24 cm/s    Left Ventricular Outflow Tract Mean Gradient 0.25 mmHg    TDI LATERAL 0.07 m/s    LVIDd 6.41 (A) 3.5 - 6.0 cm    IVS 1.34 (A) 0.6 - 1.1 cm    Posterior Wall 1.43 (A) 0.6 - 1.1 cm    LVIDs 6.05 (A) 2.1 - 4.0 cm    FS 6 28 - 44 %    LA volume 116.45 cm3    Sinus 2.66 cm    STJ 2.47 cm    Ascending aorta 2.54 cm    LV mass 425.12 g    LA size 4.46 cm    TAPSE 1.10 cm    Left Ventricle Relative Wall Thickness 0.45 cm    AV mean gradient 1 mmHg    AV valve area 1.05 cm2    AV Velocity Ratio 0.41     AV index (prosthetic) 0.35     MV valve area p 1/2 method 4.37 cm2    E/A ratio 3.52     Mean e' 0.06 m/s    E wave deceleration time 173.52 msec    IVRT 51.38 msec    LVOT diameter 1.95 cm    LVOT area 3.0 cm2    LVOT peak marcus 0.32 m/s    LVOT peak VTI 3.60 cm    Ao peak marcus 0.78 m/s    Ao VTI 10.2 cm    Mr max marcus 4.80 m/s    LVOT stroke volume 10.75 cm3    AV peak gradient 2 mmHg    E/E' ratio 18.55 m/s    MV Peak E Marcus 1.02 m/s    TR Max Marcus 4.77 m/s    MV stenosis pressure 1/2 time 50.32 ms    MV Peak A Marcus 0.29 m/s    LV Systolic Volume 183.41 mL    LV Systolic Volume Index 114.6 mL/m2    LV Diastolic Volume 209.62 mL    LV Diastolic Volume Index 131.01 mL/m2    LA Volume Index 72.8 mL/m2    LV Mass Index 266 g/m2    RA Major Axis 6.43 cm    Left Atrium Minor Axis 6.44 cm    Left Atrium Major Axis 6.36 cm    Triscuspid Valve Regurgitation Peak Gradient 91 mmHg    RA Width 5.22 cm    Right Atrial Pressure (from IVC) 15 mmHg    EF 15 %    TV rest pulmonary artery pressure 106 mmHg    Narrative    · The left ventricle is severely enlarged with concentric hypertrophy and   severely decreased systolic function.  · The estimated ejection fraction is 15%.  · Severe left atrial enlargement.  · Severe right atrial  enlargement.  · Grade III left ventricular diastolic dysfunction.  · The estimated PA systolic pressure is 106 mmHg.  · Normal right ventricular size with mildly reduced right ventricular   systolic function.  · There is pulmonary hypertension.  · Elevated central venous pressure (15 mmHg).  · Moderate to severe tricuspid regurgitation.  · There is severe left ventricular global hypokinesis.  · Moderate pulmonic regurgitation.  · Moderate mitral regurgitation.  · There is a small right pleural effusion.        Assessment and Plan:       * Hemoptysis  -Likely in setting of fluid overload  -Resume AC as tolerated (patient has Eliquis on home med list, but says it's prescribed as only once a day)    Anemia  -H/H 7.3/25.8  -Transfuse prn, as per hospital medicine  -Monitor closely when AC resumed    Chronic deep vein thrombosis (DVT) of right lower extremity  -s/p prior thrombectomy  -Resume AC as tolerated    Tobacco use  -Denies recent usage    Non compliance w medication regimen  -Counseled on compliance    Cocaine abuse  UDS negative  Denies recent use    Acute on chronic systolic congestive heart failure  -Patient with known CHF, EF 25% by last echo in Care Everywhere  -Grossly overloaded on exam  -Continue IV diuresis  -Resume BB if UDS negative  -Will attempt to add ARB vs Entresto pending BP trend  -Repeat Echo pending  -Patient with history of medication non-compliance  -Counseled on dietary and fluid restrictions    9/24/22  ED is negative for cocaine, metoprolol started  Repeat echo with EF 15%, moderate to severe TR, RVSP 106 mmhg-> consistent with volume overload   Continue IV diuresis   Monitor urine output  Blood pressure labile  Will need ischemia evaluation with R/LHC during this admission         VTE Risk Mitigation (From admission, onward)         Ordered     Place sequential compression device  Until discontinued         09/23/22 0539     IP VTE HIGH RISK PATIENT  Once         09/23/22 0570                 Eli Villagomez MD  Cardiology  [facility]

## 2022-09-25 NOTE — ASSESSMENT & PLAN NOTE
-holding Eliquis  -BP stable  -CTA chest negative for PE or masses    9/24:  Cont holding Eliquis  Differentials include CHF-Pulm edema vs GI issue. CTA chest negative for masses  R/LHC on Monday per Cards  Hgb remains stable    9/25:  Hgb stable and improved to 7.9 despite patient coughing up bloody sputum and as stated above likely from right heart failure and pulm edema..

## 2022-09-25 NOTE — ASSESSMENT & PLAN NOTE
Patient is identified as having Combined Systolic and Diastolic heart failure that is Acute on chronic. CHF is currently uncontrolled due to Rales/crackles on pulmonary exam and Pulmonary edema/pleural effusion on CXR. Latest ECHO performed and demonstrates- Results for orders placed during the hospital encounter of 09/22/22    Echo    Interpretation Summary  · The left ventricle is severely enlarged with concentric hypertrophy and severely decreased systolic function.  · The estimated ejection fraction is 15%.  · Severe left atrial enlargement.  · Severe right atrial enlargement.  · Grade III left ventricular diastolic dysfunction.  · The estimated PA systolic pressure is 106 mmHg.  · Normal right ventricular size with mildly reduced right ventricular systolic function.  · There is pulmonary hypertension.  · Elevated central venous pressure (15 mmHg).  · Moderate to severe tricuspid regurgitation.  · There is severe left ventricular global hypokinesis.  · Moderate pulmonic regurgitation.  · Moderate mitral regurgitation.  · There is a small right pleural effusion.  . Continue Furosemide and monitor clinical status closely. Monitor on telemetry. Patient is on CHF pathway.  Monitor strict Is&Os and daily weights.  Place on fluid restriction of 1.5 L. Continue to stress to patient importance of self efficacy and  on diet for CHF. Last BNP reviewed- and noted below   Recent Labs   Lab 09/23/22  0053   BNP 3,610*   .  Strict I&Os.  IV Lasix.

## 2022-09-25 NOTE — ASSESSMENT & PLAN NOTE
-Patient with known CHF, EF 25% by last echo in Care Everywhere  -Grossly overloaded on exam  -Continue IV diuresis  -Resume BB if UDS negative  -Will attempt to add ARB vs Entresto pending BP trend  -Repeat Echo pending  -Patient with history of medication non-compliance  -Counseled on dietary and fluid restrictions    9/24/22  ED is negative for cocaine, metoprolol started  Repeat echo with EF 15%, moderate to severe TR, RVSP 106 mmhg-> consistent with volume overload   Continue IV diuresis   Monitor urine output  Blood pressure labile  Will need ischemia evaluation with R/LHC during this admission

## 2022-09-25 NOTE — ASSESSMENT & PLAN NOTE
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

## 2022-09-25 NOTE — HOSPITAL COURSE
Afebrile.  Chest x-ray CT angio of the chest venous ultrasound of lower extremity reviewed.  O2 sat 100% on room air.    09/26: seen and examined: some streaks bloody sputum, chest Ct reviewed: T max 98.6F, on RA, Abx: CEFTRIAXONE + DOXY    09/27: seen and examined; Feels better occasional old dark blood streaks, T max: 98F, on RA Abx CEFTRIAXONE + DOXY, CXR yesterday reveiwed: may need IVC if prolong anticoagulation interruption    09/28: seen and examined; Feels better occasional old dark blood streaks, T max: 98. 9F, on RA Abx CEFTRIAXONE + DOXY+ ZYVOX, CXR yesterday reveiwed: await IVC filter, CXR improved.    09/29 seen and examined; Feels better   T max: 98. 9F, on RA Abx CEFTRIAXONE + DOXY+ ZYVOX, CXR   reveiwed SP  IVC filter, CXR improved.

## 2022-09-26 LAB
ALBUMIN SERPL BCP-MCNC: 2.5 G/DL (ref 3.5–5.2)
ALP SERPL-CCNC: 73 U/L (ref 55–135)
ALT SERPL W/O P-5'-P-CCNC: 13 U/L (ref 10–44)
ANION GAP SERPL CALC-SCNC: 11 MMOL/L (ref 8–16)
AST SERPL-CCNC: 20 U/L (ref 10–40)
BASOPHILS # BLD AUTO: 0.04 K/UL (ref 0–0.2)
BASOPHILS NFR BLD: 0.7 % (ref 0–1.9)
BILIRUB SERPL-MCNC: 2 MG/DL (ref 0.1–1)
BNP SERPL-MCNC: 3048 PG/ML (ref 0–99)
BUN SERPL-MCNC: 16 MG/DL (ref 6–20)
CALCIUM SERPL-MCNC: 8.5 MG/DL (ref 8.7–10.5)
CHLORIDE SERPL-SCNC: 103 MMOL/L (ref 95–110)
CO2 SERPL-SCNC: 21 MMOL/L (ref 23–29)
CREAT SERPL-MCNC: 0.8 MG/DL (ref 0.5–1.4)
DIFFERENTIAL METHOD: ABNORMAL
EOSINOPHIL # BLD AUTO: 0.1 K/UL (ref 0–0.5)
EOSINOPHIL NFR BLD: 1.8 % (ref 0–8)
ERYTHROCYTE [DISTWIDTH] IN BLOOD BY AUTOMATED COUNT: 25.6 % (ref 11.5–14.5)
EST. GFR  (NO RACE VARIABLE): >60 ML/MIN/1.73 M^2
GLUCOSE SERPL-MCNC: 72 MG/DL (ref 70–110)
HCT VFR BLD AUTO: 28.2 % (ref 37–48.5)
HGB BLD-MCNC: 8 G/DL (ref 12–16)
IMM GRANULOCYTES # BLD AUTO: 0.02 K/UL (ref 0–0.04)
IMM GRANULOCYTES NFR BLD AUTO: 0.4 % (ref 0–0.5)
LYMPHOCYTES # BLD AUTO: 1.8 K/UL (ref 1–4.8)
LYMPHOCYTES NFR BLD: 32.2 % (ref 18–48)
MCH RBC QN AUTO: 23.1 PG (ref 27–31)
MCHC RBC AUTO-ENTMCNC: 28.4 G/DL (ref 32–36)
MCV RBC AUTO: 81 FL (ref 82–98)
MONOCYTES # BLD AUTO: 0.4 K/UL (ref 0.3–1)
MONOCYTES NFR BLD: 7.2 % (ref 4–15)
NEUTROPHILS # BLD AUTO: 3.1 K/UL (ref 1.8–7.7)
NEUTROPHILS NFR BLD: 57.7 % (ref 38–73)
NRBC BLD-RTO: 0 /100 WBC
PLATELET # BLD AUTO: 225 K/UL (ref 150–450)
PMV BLD AUTO: 10 FL (ref 9.2–12.9)
POTASSIUM SERPL-SCNC: 4.2 MMOL/L (ref 3.5–5.1)
PROT SERPL-MCNC: 5.8 G/DL (ref 6–8.4)
RBC # BLD AUTO: 3.47 M/UL (ref 4–5.4)
SODIUM SERPL-SCNC: 135 MMOL/L (ref 136–145)
WBC # BLD AUTO: 5.44 K/UL (ref 3.9–12.7)

## 2022-09-26 PROCEDURE — 85025 COMPLETE CBC W/AUTO DIFF WBC: CPT | Performed by: PHYSICIAN ASSISTANT

## 2022-09-26 PROCEDURE — 25000003 PHARM REV CODE 250: Performed by: NURSE PRACTITIONER

## 2022-09-26 PROCEDURE — 83880 ASSAY OF NATRIURETIC PEPTIDE: CPT | Performed by: INTERNAL MEDICINE

## 2022-09-26 PROCEDURE — 99232 SBSQ HOSP IP/OBS MODERATE 35: CPT | Mod: ,,, | Performed by: INTERNAL MEDICINE

## 2022-09-26 PROCEDURE — 99232 PR SUBSEQUENT HOSPITAL CARE,LEVL II: ICD-10-PCS | Mod: ,,, | Performed by: PHYSICIAN ASSISTANT

## 2022-09-26 PROCEDURE — 99232 PR SUBSEQUENT HOSPITAL CARE,LEVL II: ICD-10-PCS | Mod: ,,, | Performed by: INTERNAL MEDICINE

## 2022-09-26 PROCEDURE — 99232 SBSQ HOSP IP/OBS MODERATE 35: CPT | Mod: ,,, | Performed by: PHYSICIAN ASSISTANT

## 2022-09-26 PROCEDURE — 36415 COLL VENOUS BLD VENIPUNCTURE: CPT | Performed by: INTERNAL MEDICINE

## 2022-09-26 PROCEDURE — 21400001 HC TELEMETRY ROOM

## 2022-09-26 PROCEDURE — 63600175 PHARM REV CODE 636 W HCPCS: Performed by: NURSE PRACTITIONER

## 2022-09-26 PROCEDURE — 80053 COMPREHEN METABOLIC PANEL: CPT | Performed by: PHYSICIAN ASSISTANT

## 2022-09-26 PROCEDURE — 63600175 PHARM REV CODE 636 W HCPCS: Performed by: STUDENT IN AN ORGANIZED HEALTH CARE EDUCATION/TRAINING PROGRAM

## 2022-09-26 PROCEDURE — 36415 COLL VENOUS BLD VENIPUNCTURE: CPT | Performed by: PHYSICIAN ASSISTANT

## 2022-09-26 PROCEDURE — 11000001 HC ACUTE MED/SURG PRIVATE ROOM

## 2022-09-26 PROCEDURE — 25000003 PHARM REV CODE 250: Performed by: INTERNAL MEDICINE

## 2022-09-26 PROCEDURE — 94761 N-INVAS EAR/PLS OXIMETRY MLT: CPT

## 2022-09-26 RX ORDER — QUETIAPINE FUMARATE 100 MG/1
100 TABLET, FILM COATED ORAL NIGHTLY
Status: DISCONTINUED | OUTPATIENT
Start: 2022-09-26 | End: 2022-09-30 | Stop reason: HOSPADM

## 2022-09-26 RX ADMIN — QUETIAPINE FUMARATE 100 MG: 100 TABLET ORAL at 09:09

## 2022-09-26 RX ADMIN — FUROSEMIDE 40 MG: 10 INJECTION, SOLUTION INTRAVENOUS at 08:09

## 2022-09-26 RX ADMIN — HYDROCODONE BITARTRATE AND ACETAMINOPHEN 1 TABLET: 5; 325 TABLET ORAL at 05:09

## 2022-09-26 RX ADMIN — ACETAMINOPHEN 650 MG: 325 TABLET ORAL at 02:09

## 2022-09-26 RX ADMIN — DOXYCYCLINE HYCLATE 100 MG: 100 TABLET, COATED ORAL at 09:09

## 2022-09-26 RX ADMIN — ATORVASTATIN CALCIUM 40 MG: 40 TABLET, FILM COATED ORAL at 09:09

## 2022-09-26 RX ADMIN — HYDROCODONE BITARTRATE AND ACETAMINOPHEN 1 TABLET: 5; 325 TABLET ORAL at 09:09

## 2022-09-26 RX ADMIN — CEFTRIAXONE 1 G: 1 INJECTION, SOLUTION INTRAVENOUS at 06:09

## 2022-09-26 NOTE — SUBJECTIVE & OBJECTIVE
Review of Systems   Constitutional: Positive for malaise/fatigue. Negative for diaphoresis, weight gain and weight loss.   HENT:  Negative for congestion and nosebleeds.    Cardiovascular:  Positive for chest pain. Negative for claudication, cyanosis, dyspnea on exertion, irregular heartbeat, leg swelling, near-syncope, orthopnea, palpitations, paroxysmal nocturnal dyspnea and syncope.   Respiratory:  Negative for cough, hemoptysis, shortness of breath, sleep disturbances due to breathing, snoring, sputum production and wheezing.    Hematologic/Lymphatic: Negative for bleeding problem. Does not bruise/bleed easily.   Skin:  Negative for rash.   Musculoskeletal:  Negative for arthritis, back pain, falls, joint pain, muscle cramps and muscle weakness.   Gastrointestinal:  Positive for bloating. Negative for abdominal pain, constipation, diarrhea, heartburn, hematemesis, hematochezia, melena, nausea and vomiting.   Genitourinary:  Negative for dysuria, hematuria and nocturia.   Neurological:  Negative for excessive daytime sleepiness, dizziness, headaches, light-headedness, loss of balance, numbness, vertigo and weakness.   Objective:     Vital Signs (Most Recent):  Temp: 98.1 °F (36.7 °C) (09/26/22 0002)  Pulse: 107 (09/26/22 0002)  Resp: 17 (09/26/22 0002)  BP: 108/79 (09/26/22 0002)  SpO2: 96 % (09/26/22 0002) Vital Signs (24h Range):  Temp:  [97.6 °F (36.4 °C)-98.2 °F (36.8 °C)] 98.1 °F (36.7 °C)  Pulse:  [] 107  Resp:  [16-20] 17  SpO2:  [96 %-100 %] 96 %  BP: ()/(65-79) 108/79     Weight: 56.7 kg (125 lb)  Body mass index is 21.46 kg/m².     SpO2: 96 %  O2 Device (Oxygen Therapy): room air      Intake/Output Summary (Last 24 hours) at 9/26/2022 0006  Last data filed at 9/25/2022 0900  Gross per 24 hour   Intake 358 ml   Output 500 ml   Net -142 ml       Lines/Drains/Airways       Peripheral Intravenous Line  Duration                  Peripheral IV - Single Lumen 09/24/22 2153 22 G  Anterior;Distal;Right Forearm 1 day                    Physical Exam  Vitals and nursing note reviewed.   Constitutional:       Appearance: Normal appearance. She is well-developed.   HENT:      Head: Normocephalic.      Mouth/Throat:      Mouth: Mucous membranes are moist.   Neck:      Vascular: No carotid bruit or JVD.   Cardiovascular:      Rate and Rhythm: Regular rhythm. Tachycardia present.      Pulses: Normal pulses.      Heart sounds: Normal heart sounds. No murmur heard.    No friction rub.   Pulmonary:      Effort: Pulmonary effort is normal. No respiratory distress.      Breath sounds: Normal breath sounds. No wheezing or rales.   Abdominal:      General: Bowel sounds are normal. There is distension.      Palpations: Abdomen is soft.      Tenderness: There is no abdominal tenderness. There is no guarding.   Musculoskeletal:         General: No swelling or tenderness.      Cervical back: Neck supple. No tenderness.      Right lower leg: No edema.      Left lower leg: No edema.   Skin:     General: Skin is warm and dry.      Capillary Refill: Capillary refill takes less than 2 seconds.      Findings: No rash.   Neurological:      General: No focal deficit present.      Mental Status: She is alert and oriented to person, place, and time.   Psychiatric:         Mood and Affect: Mood normal.         Behavior: Behavior normal.         Thought Content: Thought content normal.       Significant Labs: BMP:   Recent Labs   Lab 09/24/22  0510 09/25/22  0911   GLU 91 115*   * 136   K 3.9 3.5    105   CO2 18* 21*   BUN 22* 16   CREATININE 1.0 0.8   CALCIUM 8.4* 8.1*   MG 1.7  --    , CMP   Recent Labs   Lab 09/24/22  0510 09/25/22  0911   * 136   K 3.9 3.5    105   CO2 18* 21*   GLU 91 115*   BUN 22* 16   CREATININE 1.0 0.8   CALCIUM 8.4* 8.1*   PROT 5.7* 5.7*   ALBUMIN 2.5* 2.4*   BILITOT 2.7* 1.9*   ALKPHOS 79 117   AST 19 18   ALT 17 13   ANIONGAP 14 10   , CBC   Recent Labs   Lab  09/24/22  0510 09/25/22  0915   WBC 5.50 5.62   HGB 7.8* 7.9*   HCT 26.8* 27.7*    226   , Lipid Panel No results for input(s): CHOL, HDL, LDLCALC, TRIG, CHOLHDL in the last 48 hours., and Troponin   Recent Labs   Lab 09/25/22  0812 09/25/22  1217 09/25/22  1721   TROPONINI 0.020 0.021 0.016       Significant Imaging: Echocardiogram: 2D echo with color flow doppler: No results found for this or any previous visit. and Transthoracic echo (TTE) complete (Cupid Only):   Results for orders placed or performed during the hospital encounter of 09/22/22   Echo   Result Value Ref Range    BSA 1.6 m2    TDI SEPTAL 0.04 m/s    LV LATERAL E/E' RATIO 14.57 m/s    LV SEPTAL E/E' RATIO 25.50 m/s    LA WIDTH 4.80 cm    IVC diameter 2.77 cm    Left Ventricular Outflow Tract Mean Velocity 0.24 cm/s    Left Ventricular Outflow Tract Mean Gradient 0.25 mmHg    TDI LATERAL 0.07 m/s    LVIDd 6.41 (A) 3.5 - 6.0 cm    IVS 1.34 (A) 0.6 - 1.1 cm    Posterior Wall 1.43 (A) 0.6 - 1.1 cm    LVIDs 6.05 (A) 2.1 - 4.0 cm    FS 6 28 - 44 %    LA volume 116.45 cm3    Sinus 2.66 cm    STJ 2.47 cm    Ascending aorta 2.54 cm    LV mass 425.12 g    LA size 4.46 cm    TAPSE 1.10 cm    Left Ventricle Relative Wall Thickness 0.45 cm    AV mean gradient 1 mmHg    AV valve area 1.05 cm2    AV Velocity Ratio 0.41     AV index (prosthetic) 0.35     MV valve area p 1/2 method 4.37 cm2    E/A ratio 3.52     Mean e' 0.06 m/s    E wave deceleration time 173.52 msec    IVRT 51.38 msec    LVOT diameter 1.95 cm    LVOT area 3.0 cm2    LVOT peak marcus 0.32 m/s    LVOT peak VTI 3.60 cm    Ao peak marcus 0.78 m/s    Ao VTI 10.2 cm    Mr max marcus 4.80 m/s    LVOT stroke volume 10.75 cm3    AV peak gradient 2 mmHg    E/E' ratio 18.55 m/s    MV Peak E Marcus 1.02 m/s    TR Max Marcus 4.77 m/s    MV stenosis pressure 1/2 time 50.32 ms    MV Peak A Marcus 0.29 m/s    LV Systolic Volume 183.41 mL    LV Systolic Volume Index 114.6 mL/m2    LV Diastolic Volume 209.62 mL    LV  Diastolic Volume Index 131.01 mL/m2    LA Volume Index 72.8 mL/m2    LV Mass Index 266 g/m2    RA Major Axis 6.43 cm    Left Atrium Minor Axis 6.44 cm    Left Atrium Major Axis 6.36 cm    Triscuspid Valve Regurgitation Peak Gradient 91 mmHg    RA Width 5.22 cm    Right Atrial Pressure (from IVC) 15 mmHg    EF 15 %    TV rest pulmonary artery pressure 106 mmHg    Narrative    · The left ventricle is severely enlarged with concentric hypertrophy and   severely decreased systolic function.  · The estimated ejection fraction is 15%.  · Severe left atrial enlargement.  · Severe right atrial enlargement.  · Grade III left ventricular diastolic dysfunction.  · The estimated PA systolic pressure is 106 mmHg.  · Normal right ventricular size with mildly reduced right ventricular   systolic function.  · There is pulmonary hypertension.  · Elevated central venous pressure (15 mmHg).  · Moderate to severe tricuspid regurgitation.  · There is severe left ventricular global hypokinesis.  · Moderate pulmonic regurgitation.  · Moderate mitral regurgitation.  · There is a small right pleural effusion.

## 2022-09-26 NOTE — PROGRESS NOTES
"[facility]  Cardiology  Progress Note    Patient Name: Teresa Echols  MRN: [id number]  Admission Date: 9/22/2022  Hospital Length of Stay: 1 days  Code Status: Full Code   Attending Physician: Irvin Escalante MD   Primary Care Physician: Juan Contreras MD  Expected Discharge Date:   Principal Problem:Hemoptysis    Subjective:     Hospital Course:   Ms. Echols is a 37 year old female patient whose current medical conditions include chronic systolic CHF (25% per echo 6/22 in Care Everywhere), history of cocaine abuse, RLE DVT s/p thrombectomy in 6/22, CHALINO, and non-compliance who presented to [facility] ED yesterday with a chief complaint of worsening SOB over the past few days. Associated symptoms included hemoptysis, BLE edema, abdominal bloating, and orthopnea. She denied any associated fever, chills, palpitations, near syncope, or syncope. Initial workup in ED revealed anemia (H/H 7.3/25.8), D-dimer +, and BNP of 3610. CTA negative for PE but did show findings consistent with vascular congestion and right heart strain and patient was subsequently admitted for further evaluation and treatment. Cardiology consulted to assist with management. Patient seen and examined today, resting in bed. Remains SOB. Denies osvaldo chest pain but does admit to some left upper chest "tingling". States she has been having issues with SOB/fluid overload ever since her thrombectomy/hospital admission in June. She claims she has been compliant with her medications, but has been using Lasix prn and taking Eliquis only once daily. Admits to eating 2 bags of potato chips prior to admission. Chart reviewed. Initial troponin negative. Repeat echo pending.      9/24/22- doing well.  Abdomen still.  Reports good urine output.  Lasix has been held.  Patient hypotensive.  Blood thinner stopped due to hemoptysis.  Repeat echo with EF 15%, mod-severe TR, RVSP 106 mmhg    9/25/22- continues to have abdominal distension.  Continues to have " hemoptysis, stable Hgb.  Was not started on OAC during admission.  Lactate 2.2, upper limit of normal.  C/o left sided pain. K borderline. BB stopped due to hypotension. Does not feel it she urinating a lot.    9/26/22-Patient seen and examined today, sitting up in bed. Feels ok. SOB slowly improving. Still with abdominal bloating as well as hemoptysis. Pulmonary on board. Labs reviewed. H/H stable. BNP remains elevated.           Review of Systems   Constitutional: Positive for malaise/fatigue.   HENT: Negative.     Eyes: Negative.    Cardiovascular:  Positive for dyspnea on exertion.   Respiratory:  Positive for shortness of breath.    Endocrine: Negative.    Hematologic/Lymphatic: Negative.    Skin: Negative.    Musculoskeletal: Negative.    Gastrointestinal:  Positive for bloating.   Genitourinary: Negative.    Neurological: Negative.    Psychiatric/Behavioral: Negative.     Allergic/Immunologic: Negative.    Objective:     Vital Signs (Most Recent):  Temp: 98.4 °F (36.9 °C) (09/26/22 0754)  Pulse: 100 (09/26/22 1134)  Resp: 15 (09/26/22 0913)  BP: 105/72 (09/26/22 0754)  SpO2: 100 % (09/26/22 0754) Vital Signs (24h Range):  Temp:  [97.3 °F (36.3 °C)-98.4 °F (36.9 °C)] 98.4 °F (36.9 °C)  Pulse:  [] 100  Resp:  [15-20] 15  SpO2:  [96 %-100 %] 100 %  BP: (103-111)/(62-79) 105/72     Weight: 56.7 kg (125 lb)  Body mass index is 21.46 kg/m².     SpO2: 100 %  O2 Device (Oxygen Therapy): room air      Intake/Output Summary (Last 24 hours) at 9/26/2022 1209  Last data filed at 9/26/2022 0805  Gross per 24 hour   Intake 0 ml   Output --   Net 0 ml       Lines/Drains/Airways       Peripheral Intravenous Line  Duration                  Peripheral IV - Single Lumen 09/24/22 2153 22 G Anterior;Distal;Right Forearm 1 day         Peripheral IV - Single Lumen 09/26/22 0652 22 G Left;Posterior Hand <1 day                    Physical Exam  Vitals and nursing note reviewed.   Constitutional:       General: She is not in  acute distress.     Appearance: Normal appearance. She is well-developed. She is not diaphoretic.   HENT:      Head: Normocephalic and atraumatic.   Eyes:      General:         Right eye: No discharge.         Left eye: No discharge.      Pupils: Pupils are equal, round, and reactive to light.   Neck:      Thyroid: No thyromegaly.      Vascular: No JVD.      Trachea: No tracheal deviation.   Cardiovascular:      Rate and Rhythm: Regular rhythm. Tachycardia present.      Pulses: Intact distal pulses.      Heart sounds: Normal heart sounds, S1 normal and S2 normal. No murmur heard.  Pulmonary:      Effort: Pulmonary effort is normal. No respiratory distress.      Breath sounds: Rales present. No wheezing.   Abdominal:      General: There is distension.      Tenderness: There is no rebound.   Musculoskeletal:      Cervical back: Neck supple.      Right lower leg: No edema.      Left lower leg: No edema.   Skin:     General: Skin is warm and dry.      Findings: No erythema.   Neurological:      General: No focal deficit present.      Mental Status: She is alert and oriented to person, place, and time.   Psychiatric:         Mood and Affect: Mood normal.         Behavior: Behavior normal.         Thought Content: Thought content normal.       Significant Labs: CMP   Recent Labs   Lab 09/25/22  0911 09/26/22  0827    135*   K 3.5 4.2    103   CO2 21* 21*   * 72   BUN 16 16   CREATININE 0.8 0.8   CALCIUM 8.1* 8.5*   PROT 5.7* 5.8*   ALBUMIN 2.4* 2.5*   BILITOT 1.9* 2.0*   ALKPHOS 117 73   AST 18 20   ALT 13 13   ANIONGAP 10 11   , CBC   Recent Labs   Lab 09/25/22  0915 09/26/22  0827   WBC 5.62 5.44   HGB 7.9* 8.0*   HCT 27.7* 28.2*    225   , Troponin   Recent Labs   Lab 09/25/22  0812 09/25/22  1217 09/25/22  1721   TROPONINI 0.020 0.021 0.016   , and All pertinent lab results from the last 24 hours have been reviewed.    Significant Imaging: Echocardiogram: Transthoracic echo (TTE) complete  (Cupid Only):   Results for orders placed or performed during the hospital encounter of 09/22/22   Echo   Result Value Ref Range    BSA 1.6 m2    TDI SEPTAL 0.04 m/s    LV LATERAL E/E' RATIO 14.57 m/s    LV SEPTAL E/E' RATIO 25.50 m/s    LA WIDTH 4.80 cm    IVC diameter 2.77 cm    Left Ventricular Outflow Tract Mean Velocity 0.24 cm/s    Left Ventricular Outflow Tract Mean Gradient 0.25 mmHg    TDI LATERAL 0.07 m/s    LVIDd 6.41 (A) 3.5 - 6.0 cm    IVS 1.34 (A) 0.6 - 1.1 cm    Posterior Wall 1.43 (A) 0.6 - 1.1 cm    LVIDs 6.05 (A) 2.1 - 4.0 cm    FS 6 28 - 44 %    LA volume 116.45 cm3    Sinus 2.66 cm    STJ 2.47 cm    Ascending aorta 2.54 cm    LV mass 425.12 g    LA size 4.46 cm    TAPSE 1.10 cm    Left Ventricle Relative Wall Thickness 0.45 cm    AV mean gradient 1 mmHg    AV valve area 1.05 cm2    AV Velocity Ratio 0.41     AV index (prosthetic) 0.35     MV valve area p 1/2 method 4.37 cm2    E/A ratio 3.52     Mean e' 0.06 m/s    E wave deceleration time 173.52 msec    IVRT 51.38 msec    LVOT diameter 1.95 cm    LVOT area 3.0 cm2    LVOT peak marcus 0.32 m/s    LVOT peak VTI 3.60 cm    Ao peak marcus 0.78 m/s    Ao VTI 10.2 cm    Mr max marcus 4.80 m/s    LVOT stroke volume 10.75 cm3    AV peak gradient 2 mmHg    E/E' ratio 18.55 m/s    MV Peak E Marcus 1.02 m/s    TR Max Marcus 4.77 m/s    MV stenosis pressure 1/2 time 50.32 ms    MV Peak A Marcus 0.29 m/s    LV Systolic Volume 183.41 mL    LV Systolic Volume Index 114.6 mL/m2    LV Diastolic Volume 209.62 mL    LV Diastolic Volume Index 131.01 mL/m2    LA Volume Index 72.8 mL/m2    LV Mass Index 266 g/m2    RA Major Axis 6.43 cm    Left Atrium Minor Axis 6.44 cm    Left Atrium Major Axis 6.36 cm    Triscuspid Valve Regurgitation Peak Gradient 91 mmHg    RA Width 5.22 cm    Right Atrial Pressure (from IVC) 15 mmHg    EF 15 %    TV rest pulmonary artery pressure 106 mmHg    Narrative    · The left ventricle is severely enlarged with concentric hypertrophy and   severely  decreased systolic function.  · The estimated ejection fraction is 15%.  · Severe left atrial enlargement.  · Severe right atrial enlargement.  · Grade III left ventricular diastolic dysfunction.  · The estimated PA systolic pressure is 106 mmHg.  · Normal right ventricular size with mildly reduced right ventricular   systolic function.  · There is pulmonary hypertension.  · Elevated central venous pressure (15 mmHg).  · Moderate to severe tricuspid regurgitation.  · There is severe left ventricular global hypokinesis.  · Moderate pulmonic regurgitation.  · Moderate mitral regurgitation.  · There is a small right pleural effusion.      , EKG: Reviewed, and X-Ray: CXR: X-Ray Chest 1 View (CXR): No results found for this visit on 09/22/22. and X-Ray Chest PA and Lateral (CXR): No results found for this visit on 09/22/22.    Assessment and Plan:   Patient who presents with decompensated combined CHF/hemoptysis. Continue IV diuresis. Pulmonary following. Ischemic workup with MPI stress test once stable.     * Hemoptysis  -Likely in setting of fluid overload  -Resume AC as tolerated (patient has Eliquis on home med list, but says it's prescribed as only once a day)    9/25/22  Pulmonology consulted, recommend holding OAC given hemoptysis    Anemia  -Hgb range 7-8  -Transfuse prn, as per hospital medicine      Tobacco use  -Denies recent usage    Non compliance w medication regimen  -Counseled on compliance    Cocaine abuse  -UDS negative  -Denies recent use    Acute on chronic systolic congestive heart failure  -Patient with known CHF, EF 25% by last echo in Care Everywhere  -Grossly overloaded on exam  -Continue IV diuresis  -Resume BB if UDS negative  -Will attempt to add ARB vs Entresto pending BP trend  -Repeat Echo pending  -Patient with history of medication non-compliance  -Counseled on dietary and fluid restrictions    9/24/22  ED is negative for cocaine, metoprolol started  Repeat echo with EF 15%, moderate to  severe TR, RVSP 106 mmhg-> consistent with volume overload   Continue IV diuresis   Monitor urine output  Blood pressure labile  Will need ischemia evaluation with R/LHC during this admission     9/25/22  Beta-blocker held due to hypotension  Lactate 2.2, higher limit of normal   Will try metolazone 2.5 mg and assess output   Will consider starting dobutamine if still decrease urine output  R/LHC possibly tomorrow (tentative), pending patient's volume status  Keep NPO at midnight    9/26/22  -BNP still elevated  -Continue IV diuresis   -Limited with OMT given borderline BP  -Ischemic evaluation with MPI stress test once hemoptysis/volume status improved        VTE Risk Mitigation (From admission, onward)         Ordered     Place sequential compression device  Until discontinued         09/23/22 0539     IP VTE HIGH RISK PATIENT  Once         09/23/22 0539                Zoe Kim PA-C  Cardiology  [facility]

## 2022-09-26 NOTE — NURSING
Received lying in bed in side-lying position, moaning, awake and alert. Assessment per flowsheet, denies any pain or discomfort at this time. Will continue to monitor.

## 2022-09-26 NOTE — PROGRESS NOTES
[facility]  Pulmonology  Progress Note    Patient Name: Teresa Echols  MRN: [id number]  Admission Date: 9/22/2022  Hospital Length of Stay: 1 days  Code Status: Full Code  Attending Provider: Irvin Escalante MD  Primary Care Provider: Juan Contreras MD   Principal Problem: Hemoptysis    Subjective:     Interval History:     09/26: seen and examined: some streaks bloody sputum, chest Ct reviewed: T max 98.6F, on RA, Abx: CEFTRIAXONE + DOXY      Respiratory ROS: positive for - cough and hemoptysis  negative for - orthopnea, pleuritic pain, shortness of breath, tachypnea, or wheezing      Objective:     Vital Signs (Most Recent):  Temp: 97 °F (36.1 °C) (09/26/22 1228)  Pulse: (!) 115 (09/26/22 1228)  Resp: 20 (09/26/22 1228)  BP: 105/72 (09/26/22 0754)  SpO2: 100 % (09/26/22 1228)   Vital Signs (24h Range):  Temp:  [97 °F (36.1 °C)-98.4 °F (36.9 °C)] 97 °F (36.1 °C)  Pulse:  [] 115  Resp:  [15-20] 20  SpO2:  [96 %-100 %] 100 %  BP: (103-111)/(62-79) 105/72     Weight: 56.7 kg (125 lb)  Body mass index is 21.46 kg/m².      Intake/Output Summary (Last 24 hours) at 9/26/2022 1318  Last data filed at 9/26/2022 0805  Gross per 24 hour   Intake 0 ml   Output --   Net 0 ml       Physical Exam  Vitals and nursing note reviewed.   Constitutional:       General: She is not in acute distress.     Appearance: She is well-developed. She is ill-appearing.       HENT:      Head: Normocephalic and atraumatic.      Nose: Nose normal.   Eyes:      Extraocular Movements: Extraocular movements intact.   Cardiovascular:      Rate and Rhythm: Regular rhythm. Tachycardia present.      Pulses: Normal pulses.      Heart sounds: Normal heart sounds.   Pulmonary:      Effort: Pulmonary effort is normal.      Breath sounds: Normal breath sounds. No stridor.   Abdominal:      General: Bowel sounds are normal. There is no distension.      Palpations: Abdomen is soft.   Musculoskeletal:         General: No signs of injury. Normal  range of motion.      Cervical back: Normal range of motion and neck supple.   Skin:     General: Skin is warm and dry.      Capillary Refill: Capillary refill takes 2 to 3 seconds.   Neurological:      General: No focal deficit present.      Mental Status: She is alert and oriented to person, place, and time.   Psychiatric:         Mood and Affect: Mood normal.         Behavior: Behavior normal.       Vents:       Lines/Drains/Airways       Peripheral Intravenous Line  Duration                  Peripheral IV - Single Lumen 09/24/22 2153 22 G Anterior;Distal;Right Forearm 1 day         Peripheral IV - Single Lumen 09/26/22 0652 22 G Left;Posterior Hand <1 day                    Significant Labs:    CBC/Anemia Profile:  Recent Labs   Lab 09/25/22  0915 09/26/22  0827   WBC 5.62 5.44   HGB 7.9* 8.0*   HCT 27.7* 28.2*    225   MCV 79* 81*   RDW 25.5* 25.6*        Chemistries:  Recent Labs   Lab 09/25/22  0911 09/26/22  0827    135*   K 3.5 4.2    103   CO2 21* 21*   BUN 16 16   CREATININE 0.8 0.8   CALCIUM 8.1* 8.5*   ALBUMIN 2.4* 2.5*   PROT 5.7* 5.8*   BILITOT 1.9* 2.0*   ALKPHOS 117 73   ALT 13 13   AST 18 20       Blood Culture:   Recent Labs   Lab 09/25/22  1721 09/25/22  1722   LABBLOO No Growth to date No Growth to date     Respiratory Culture: No results for input(s): GSRESP, RESPIRATORYC in the last 48 hours.  All pertinent labs within the past 24 hours have been reviewed.    Significant Imaging:  I have reviewed all pertinent imaging results/findings within the past 24 hours.    X-Ray Chest AP Portable  Narrative: EXAMINATION:  XR CHEST AP PORTABLE    CLINICAL HISTORY:  CHF;    FINDINGS:  Single view of the chest.  Comparison 09/23/2022.    Cardiac silhouette is enlarged but stable.  Patchy infiltrate left lower lobe concerning for airspace disease or aspiration.  Trace left pleural effusion suspected.  No pneumothorax.  Bones appear intact.  Impression: See above.    Electronically signed  by: Clarence Alicea MD  Date:    09/26/2022  Time:    07:25     EXAMINATION:  US LOWER EXTREMITY ARTERIES RIGHT     CLINICAL HISTORY:  History of RLE arterial embolism;     TECHNIQUE:  Bilateral lower extremity arterial duplex ultrasound examination performed. Multiple gray scale and color doppler images were obtained in addition to waveform analysis.     COMPARISON:  No dedicated priors.     FINDINGS:  The peak systolic velocities on the right are as follows, in centimeters/second:     Common femoral artery: 32, triphasic     Deep femoral artery: 28, triphasic     Superficial femoral artery, proximal: 40, triphasic     Superficial femoral artery, mid portion: 62, triphasic     Superficial femoral artery, distal: 30, triphasic     Popliteal artery: 22-30, triphasic     Posterior tibial artery: 42, triphasic     Anterior tibial artery: 16, triphasic.     Peroneal artery: Thirty-three, triphasic     Spectral broadening throughout but without tardus parvus waveforms.     Impression:     Low velocities throughout, a nonspecific finding, correlation with cardiac output.  Further evaluation for upstream stenosis as warranted, however the waveforms appear fairly well preserved.     No arterial occlusion or hemodynamically significant stenosis.    EXAMINATION:  US LOWER EXTREMITY VEINS RIGHT     CLINICAL HISTORY:  Pain in right leg     TECHNIQUE:  Duplex and color flow Doppler evaluation and graded compression of the right lower extremity veins was performed.     COMPARISON:  None     FINDINGS:  Right thigh veins: The common femoral, femoral, popliteal, upper greater saphenous, and deep femoral veins are patent and free of thrombus. The veins are normally compressible and have normal phasic flow and augmentation response.     Right calf veins: The visualized calf veins are patent.     Contralateral CFV: The contralateral (left) common femoral vein is patent and free of thrombus.     Miscellaneous: High pulsatility waveforms  throughout the lower extremity as may occur with cardiac dysfunction.     Impression:     No evidence of deep venous thrombosis within the right lower extremity.      ABG  No results for input(s): PH, PO2, PCO2, HCO3, BE in the last 168 hours.  Assessment/Plan:     * Hemoptysis  Hold Apixaban.  Collect sputum to quantize hemoptysis.  Send sputum microbiology     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  Off anticoagulation due to hemoptysis.  Treat CHF.  Vascular evaluation.    SOB (shortness of breath)  Target O2 sat 92-94%.  Multifactorial CHF plus suspected bilateral pneumonia.  IV Rocephin doxycycline.  Check procalcitonin CRP.  Send respiratory culture.  Nasal MRSA.  Blood culture.    Cocaine abuse  Cocaine abuse complicated by systolic heart failure and arterial lower extremity thrombosis status post embolectomy June 2022.    Acute on chronic systolic congestive heart failure  Patient is identified as having Combined Systolic and Diastolic heart failure that is Acute on chronic. CHF is currently uncontrolled due to Rales/crackles on pulmonary exam and Pulmonary edema/pleural effusion on CXR. Latest ECHO performed and demonstrates- Results for orders placed during the hospital encounter of 09/22/22    Echo    Interpretation Summary  · The left ventricle is severely enlarged with concentric hypertrophy and severely decreased systolic function.  · The estimated ejection fraction is 15%.  · Severe left atrial enlargement.  · Severe right atrial enlargement.  · Grade III left ventricular diastolic dysfunction.  · The estimated PA systolic pressure is 106 mmHg.  · Normal right ventricular size with mildly reduced right ventricular systolic function.  · There is pulmonary hypertension.  · Elevated central venous pressure (15 mmHg).  · Moderate to severe tricuspid regurgitation.  · There is severe left ventricular global hypokinesis.  · Moderate pulmonic regurgitation.  · Moderate mitral regurgitation.  ·  There is a small right pleural effusion.  . Continue Furosemide and monitor clinical status closely. Monitor on telemetry. Patient is on CHF pathway.  Monitor strict Is&Os and daily weights.  Place on fluid restriction of 1.5 L. Continue to stress to patient importance of self efficacy and  on diet for CHF. Last BNP reviewed- and noted below   Recent Labs   Lab 09/26/22  0615   BNP 3,048*   .  Strict I&Os.  IV Lasix.          I have reviewed all labs and imaging studies and compared to previous results. I have also discussed labs with all the teams in the medical care of the patient and my plan is outlined below       Jesus Bowen MD  Pulmonology  [facility]

## 2022-09-26 NOTE — SUBJECTIVE & OBJECTIVE
Interval History: Cont IV diuresis and once volume status improved plan for MPI stress test per Cards. Hgb improving. Procal negative but CRP elevated treating for PNA empirically. Cont holding Eliquis and send sputum to micro.     Review of Systems   Constitutional:  Positive for fatigue. Negative for chills and fever.   HENT: Negative.  Negative for congestion, rhinorrhea, sore throat and trouble swallowing.    Eyes: Negative.    Respiratory:  Positive for cough (blood) and shortness of breath. Negative for wheezing.    Cardiovascular:  Negative for chest pain, palpitations and leg swelling.   Gastrointestinal: Negative.  Negative for abdominal pain, diarrhea, nausea and vomiting.   Endocrine: Negative.    Genitourinary: Negative.  Negative for dysuria and flank pain.   Musculoskeletal: Negative.  Negative for back pain.   Skin: Negative.  Negative for rash.   Allergic/Immunologic: Negative.    Neurological: Negative.  Negative for speech difficulty, weakness, numbness and headaches.   Hematological: Negative.    Psychiatric/Behavioral: Negative.  Negative for hallucinations. The patient is not nervous/anxious.    All other systems reviewed and are negative.  Objective:     Vital Signs (Most Recent):  Temp: 97 °F (36.1 °C) (09/26/22 1228)  Pulse: 104 (09/26/22 1332)  Resp: 20 (09/26/22 1228)  BP: 105/72 (09/26/22 0754)  SpO2: 100 % (09/26/22 1228) Vital Signs (24h Range):  Temp:  [97 °F (36.1 °C)-98.4 °F (36.9 °C)] 97 °F (36.1 °C)  Pulse:  [] 104  Resp:  [15-20] 20  SpO2:  [96 %-100 %] 100 %  BP: (103-111)/(62-79) 105/72     Weight: 56.7 kg (125 lb)  Body mass index is 21.46 kg/m².    Intake/Output Summary (Last 24 hours) at 9/26/2022 1545  Last data filed at 9/26/2022 1200  Gross per 24 hour   Intake 380 ml   Output --   Net 380 ml      Physical Exam  Vitals and nursing note reviewed.   Constitutional:       General: She is awake. She is not in acute distress.     Appearance: She is ill-appearing.       Interventions: Nasal cannula in place.      Comments: Currently on 2 L O2 N/C   HENT:      Head: Normocephalic and atraumatic.      Mouth/Throat:      Mouth: Mucous membranes are moist.   Eyes:      General: No scleral icterus.     Conjunctiva/sclera: Conjunctivae normal.   Cardiovascular:      Rate and Rhythm: Normal rate and regular rhythm.      Heart sounds: No murmur heard.  Pulmonary:      Effort: Pulmonary effort is normal. No respiratory distress.      Breath sounds: No wheezing, rhonchi or rales.      Comments: Blood tinged sputum  Abdominal:      Palpations: Abdomen is soft.      Tenderness: There is no abdominal tenderness.   Musculoskeletal:         General: No swelling (Trace edema bilateral ankles). Normal range of motion.      Cervical back: Normal range of motion and neck supple.   Skin:     General: Skin is warm.   Neurological:      General: No focal deficit present.      Mental Status: She is alert and oriented to person, place, and time. Mental status is at baseline.   Psychiatric:         Attention and Perception: Attention normal.         Mood and Affect: Affect is flat.         Speech: Speech normal.         Behavior: Behavior is cooperative.       Significant Labs: All pertinent labs within the past 24 hours have been reviewed.    Significant Imaging: I have reviewed all pertinent imaging results/findings within the past 24 hours.

## 2022-09-26 NOTE — SUBJECTIVE & OBJECTIVE
Review of Systems   Constitutional: Positive for malaise/fatigue.   HENT: Negative.     Eyes: Negative.    Cardiovascular:  Positive for dyspnea on exertion.   Respiratory:  Positive for shortness of breath.    Endocrine: Negative.    Hematologic/Lymphatic: Negative.    Skin: Negative.    Musculoskeletal: Negative.    Gastrointestinal:  Positive for bloating.   Genitourinary: Negative.    Neurological: Negative.    Psychiatric/Behavioral: Negative.     Allergic/Immunologic: Negative.    Objective:     Vital Signs (Most Recent):  Temp: 98.4 °F (36.9 °C) (09/26/22 0754)  Pulse: 100 (09/26/22 1134)  Resp: 15 (09/26/22 0913)  BP: 105/72 (09/26/22 0754)  SpO2: 100 % (09/26/22 0754) Vital Signs (24h Range):  Temp:  [97.3 °F (36.3 °C)-98.4 °F (36.9 °C)] 98.4 °F (36.9 °C)  Pulse:  [] 100  Resp:  [15-20] 15  SpO2:  [96 %-100 %] 100 %  BP: (103-111)/(62-79) 105/72     Weight: 56.7 kg (125 lb)  Body mass index is 21.46 kg/m².     SpO2: 100 %  O2 Device (Oxygen Therapy): room air      Intake/Output Summary (Last 24 hours) at 9/26/2022 1209  Last data filed at 9/26/2022 0805  Gross per 24 hour   Intake 0 ml   Output --   Net 0 ml       Lines/Drains/Airways       Peripheral Intravenous Line  Duration                  Peripheral IV - Single Lumen 09/24/22 2153 22 G Anterior;Distal;Right Forearm 1 day         Peripheral IV - Single Lumen 09/26/22 0652 22 G Left;Posterior Hand <1 day                    Physical Exam  Vitals and nursing note reviewed.   Constitutional:       General: She is not in acute distress.     Appearance: Normal appearance. She is well-developed. She is not diaphoretic.   HENT:      Head: Normocephalic and atraumatic.   Eyes:      General:         Right eye: No discharge.         Left eye: No discharge.      Pupils: Pupils are equal, round, and reactive to light.   Neck:      Thyroid: No thyromegaly.      Vascular: No JVD.      Trachea: No tracheal deviation.   Cardiovascular:      Rate and Rhythm:  Regular rhythm. Tachycardia present.      Pulses: Intact distal pulses.      Heart sounds: Normal heart sounds, S1 normal and S2 normal. No murmur heard.  Pulmonary:      Effort: Pulmonary effort is normal. No respiratory distress.      Breath sounds: Rales present. No wheezing.   Abdominal:      General: There is distension.      Tenderness: There is no rebound.   Musculoskeletal:      Cervical back: Neck supple.      Right lower leg: No edema.      Left lower leg: No edema.   Skin:     General: Skin is warm and dry.      Findings: No erythema.   Neurological:      General: No focal deficit present.      Mental Status: She is alert and oriented to person, place, and time.   Psychiatric:         Mood and Affect: Mood normal.         Behavior: Behavior normal.         Thought Content: Thought content normal.       Significant Labs: CMP   Recent Labs   Lab 09/25/22  0911 09/26/22  0827    135*   K 3.5 4.2    103   CO2 21* 21*   * 72   BUN 16 16   CREATININE 0.8 0.8   CALCIUM 8.1* 8.5*   PROT 5.7* 5.8*   ALBUMIN 2.4* 2.5*   BILITOT 1.9* 2.0*   ALKPHOS 117 73   AST 18 20   ALT 13 13   ANIONGAP 10 11   , CBC   Recent Labs   Lab 09/25/22  0915 09/26/22  0827   WBC 5.62 5.44   HGB 7.9* 8.0*   HCT 27.7* 28.2*    225   , Troponin   Recent Labs   Lab 09/25/22  0812 09/25/22  1217 09/25/22  1721   TROPONINI 0.020 0.021 0.016   , and All pertinent lab results from the last 24 hours have been reviewed.    Significant Imaging: Echocardiogram: Transthoracic echo (TTE) complete (Cupid Only):   Results for orders placed or performed during the hospital encounter of 09/22/22   Echo   Result Value Ref Range    BSA 1.6 m2    TDI SEPTAL 0.04 m/s    LV LATERAL E/E' RATIO 14.57 m/s    LV SEPTAL E/E' RATIO 25.50 m/s    LA WIDTH 4.80 cm    IVC diameter 2.77 cm    Left Ventricular Outflow Tract Mean Velocity 0.24 cm/s    Left Ventricular Outflow Tract Mean Gradient 0.25 mmHg    TDI LATERAL 0.07 m/s    LVIDd 6.41 (A)  3.5 - 6.0 cm    IVS 1.34 (A) 0.6 - 1.1 cm    Posterior Wall 1.43 (A) 0.6 - 1.1 cm    LVIDs 6.05 (A) 2.1 - 4.0 cm    FS 6 28 - 44 %    LA volume 116.45 cm3    Sinus 2.66 cm    STJ 2.47 cm    Ascending aorta 2.54 cm    LV mass 425.12 g    LA size 4.46 cm    TAPSE 1.10 cm    Left Ventricle Relative Wall Thickness 0.45 cm    AV mean gradient 1 mmHg    AV valve area 1.05 cm2    AV Velocity Ratio 0.41     AV index (prosthetic) 0.35     MV valve area p 1/2 method 4.37 cm2    E/A ratio 3.52     Mean e' 0.06 m/s    E wave deceleration time 173.52 msec    IVRT 51.38 msec    LVOT diameter 1.95 cm    LVOT area 3.0 cm2    LVOT peak marcus 0.32 m/s    LVOT peak VTI 3.60 cm    Ao peak marcus 0.78 m/s    Ao VTI 10.2 cm    Mr max marcus 4.80 m/s    LVOT stroke volume 10.75 cm3    AV peak gradient 2 mmHg    E/E' ratio 18.55 m/s    MV Peak E Marcus 1.02 m/s    TR Max Marcus 4.77 m/s    MV stenosis pressure 1/2 time 50.32 ms    MV Peak A Marcus 0.29 m/s    LV Systolic Volume 183.41 mL    LV Systolic Volume Index 114.6 mL/m2    LV Diastolic Volume 209.62 mL    LV Diastolic Volume Index 131.01 mL/m2    LA Volume Index 72.8 mL/m2    LV Mass Index 266 g/m2    RA Major Axis 6.43 cm    Left Atrium Minor Axis 6.44 cm    Left Atrium Major Axis 6.36 cm    Triscuspid Valve Regurgitation Peak Gradient 91 mmHg    RA Width 5.22 cm    Right Atrial Pressure (from IVC) 15 mmHg    EF 15 %    TV rest pulmonary artery pressure 106 mmHg    Narrative    · The left ventricle is severely enlarged with concentric hypertrophy and   severely decreased systolic function.  · The estimated ejection fraction is 15%.  · Severe left atrial enlargement.  · Severe right atrial enlargement.  · Grade III left ventricular diastolic dysfunction.  · The estimated PA systolic pressure is 106 mmHg.  · Normal right ventricular size with mildly reduced right ventricular   systolic function.  · There is pulmonary hypertension.  · Elevated central venous pressure (15 mmHg).  · Moderate to severe  tricuspid regurgitation.  · There is severe left ventricular global hypokinesis.  · Moderate pulmonic regurgitation.  · Moderate mitral regurgitation.  · There is a small right pleural effusion.      , EKG: Reviewed, and X-Ray: CXR: X-Ray Chest 1 View (CXR): No results found for this visit on 09/22/22. and X-Ray Chest PA and Lateral (CXR): No results found for this visit on 09/22/22.

## 2022-09-26 NOTE — PROGRESS NOTES
"[facility]  Cardiology  Progress Note    Patient Name: Teresa Echols  MRN: [id number]  Admission Date: 9/22/2022  Hospital Length of Stay: 1 days  Code Status: Full Code   Attending Physician: Irvin Escalante MD   Primary Care Physician: Juan Contreras MD  Expected Discharge Date:   Principal Problem:Hemoptysis    Subjective:     Hospital Course:   Ms. Echols is a 37 year old female patient whose current medical conditions include chronic systolic CHF (25% per echo 6/22 in Care Everywhere), history of cocaine abuse, RLE DVT s/p thrombectomy in 6/22, CHALINO, and non-compliance who presented to [facility] ED yesterday with a chief complaint of worsening SOB over the past few days. Associated symptoms included hemoptysis, BLE edema, abdominal bloating, and orthopnea. She denied any associated fever, chills, palpitations, near syncope, or syncope. Initial workup in ED revealed anemia (H/H 7.3/25.8), D-dimer +, and BNP of 3610. CTA negative for PE but did show findings consistent with vascular congestion and right heart strain and patient was subsequently admitted for further evaluation and treatment. Cardiology consulted to assist with management. Patient seen and examined today, resting in bed. Remains SOB. Denies osvaldo chest pain but does admit to some left upper chest "tingling". States she has been having issues with SOB/fluid overload ever since her thrombectomy/hospital admission in June. She claims she has been compliant with her medications, but has been using Lasix prn and taking Eliquis only once daily. Admits to eating 2 bags of potato chips prior to admission. Chart reviewed. Initial troponin negative. Repeat echo pending.      9/24/22- doing well.  Abdomen still.  Reports good urine output.  Lasix has been held.  Patient hypotensive.  Blood thinner stopped due to hemoptysis.  Repeat echo with EF 15%, mod-severe TR, RVSP 106 mmhg    9/25/22- continues to have abdominal distension.  Continues to have " hemoptysis, stable Hgb.  Was not started on OAC during admission.  Lactate 2.2, upper limit of normal.  C/o left sided pain. K borderline. BB stopped due to hypotension. Does not feel it she urinating a lot.        Review of Systems   Constitutional: Positive for malaise/fatigue. Negative for diaphoresis, weight gain and weight loss.   HENT:  Negative for congestion and nosebleeds.    Cardiovascular:  Positive for chest pain. Negative for claudication, cyanosis, dyspnea on exertion, irregular heartbeat, leg swelling, near-syncope, orthopnea, palpitations, paroxysmal nocturnal dyspnea and syncope.   Respiratory:  Negative for cough, hemoptysis, shortness of breath, sleep disturbances due to breathing, snoring, sputum production and wheezing.    Hematologic/Lymphatic: Negative for bleeding problem. Does not bruise/bleed easily.   Skin:  Negative for rash.   Musculoskeletal:  Negative for arthritis, back pain, falls, joint pain, muscle cramps and muscle weakness.   Gastrointestinal:  Positive for bloating. Negative for abdominal pain, constipation, diarrhea, heartburn, hematemesis, hematochezia, melena, nausea and vomiting.   Genitourinary:  Negative for dysuria, hematuria and nocturia.   Neurological:  Negative for excessive daytime sleepiness, dizziness, headaches, light-headedness, loss of balance, numbness, vertigo and weakness.   Objective:     Vital Signs (Most Recent):  Temp: 98.1 °F (36.7 °C) (09/26/22 0002)  Pulse: 107 (09/26/22 0002)  Resp: 17 (09/26/22 0002)  BP: 108/79 (09/26/22 0002)  SpO2: 96 % (09/26/22 0002) Vital Signs (24h Range):  Temp:  [97.6 °F (36.4 °C)-98.2 °F (36.8 °C)] 98.1 °F (36.7 °C)  Pulse:  [] 107  Resp:  [16-20] 17  SpO2:  [96 %-100 %] 96 %  BP: ()/(65-79) 108/79     Weight: 56.7 kg (125 lb)  Body mass index is 21.46 kg/m².     SpO2: 96 %  O2 Device (Oxygen Therapy): room air      Intake/Output Summary (Last 24 hours) at 9/26/2022 0006  Last data filed at 9/25/2022 0900  Gross  per 24 hour   Intake 358 ml   Output 500 ml   Net -142 ml       Lines/Drains/Airways       Peripheral Intravenous Line  Duration                  Peripheral IV - Single Lumen 09/24/22 2153 22 G Anterior;Distal;Right Forearm 1 day                    Physical Exam  Vitals and nursing note reviewed.   Constitutional:       Appearance: Normal appearance. She is well-developed.   HENT:      Head: Normocephalic.      Mouth/Throat:      Mouth: Mucous membranes are moist.   Neck:      Vascular: No carotid bruit or JVD.   Cardiovascular:      Rate and Rhythm: Regular rhythm. Tachycardia present.      Pulses: Normal pulses.      Heart sounds: Normal heart sounds. No murmur heard.    No friction rub.   Pulmonary:      Effort: Pulmonary effort is normal. No respiratory distress.      Breath sounds: Normal breath sounds. No wheezing or rales.   Abdominal:      General: Bowel sounds are normal. There is distension.      Palpations: Abdomen is soft.      Tenderness: There is no abdominal tenderness. There is no guarding.   Musculoskeletal:         General: No swelling or tenderness.      Cervical back: Neck supple. No tenderness.      Right lower leg: No edema.      Left lower leg: No edema.   Skin:     General: Skin is warm and dry.      Capillary Refill: Capillary refill takes less than 2 seconds.      Findings: No rash.   Neurological:      General: No focal deficit present.      Mental Status: She is alert and oriented to person, place, and time.   Psychiatric:         Mood and Affect: Mood normal.         Behavior: Behavior normal.         Thought Content: Thought content normal.       Significant Labs: BMP:   Recent Labs   Lab 09/24/22  0510 09/25/22  0911   GLU 91 115*   * 136   K 3.9 3.5    105   CO2 18* 21*   BUN 22* 16   CREATININE 1.0 0.8   CALCIUM 8.4* 8.1*   MG 1.7  --    , CMP   Recent Labs   Lab 09/24/22  0510 09/25/22  0911   * 136   K 3.9 3.5    105   CO2 18* 21*   GLU 91 115*   BUN 22* 16    CREATININE 1.0 0.8   CALCIUM 8.4* 8.1*   PROT 5.7* 5.7*   ALBUMIN 2.5* 2.4*   BILITOT 2.7* 1.9*   ALKPHOS 79 117   AST 19 18   ALT 17 13   ANIONGAP 14 10   , CBC   Recent Labs   Lab 09/24/22  0510 09/25/22  0915   WBC 5.50 5.62   HGB 7.8* 7.9*   HCT 26.8* 27.7*    226   , Lipid Panel No results for input(s): CHOL, HDL, LDLCALC, TRIG, CHOLHDL in the last 48 hours., and Troponin   Recent Labs   Lab 09/25/22  0812 09/25/22  1217 09/25/22  1721   TROPONINI 0.020 0.021 0.016       Significant Imaging: Echocardiogram: 2D echo with color flow doppler: No results found for this or any previous visit. and Transthoracic echo (TTE) complete (Cupid Only):   Results for orders placed or performed during the hospital encounter of 09/22/22   Echo   Result Value Ref Range    BSA 1.6 m2    TDI SEPTAL 0.04 m/s    LV LATERAL E/E' RATIO 14.57 m/s    LV SEPTAL E/E' RATIO 25.50 m/s    LA WIDTH 4.80 cm    IVC diameter 2.77 cm    Left Ventricular Outflow Tract Mean Velocity 0.24 cm/s    Left Ventricular Outflow Tract Mean Gradient 0.25 mmHg    TDI LATERAL 0.07 m/s    LVIDd 6.41 (A) 3.5 - 6.0 cm    IVS 1.34 (A) 0.6 - 1.1 cm    Posterior Wall 1.43 (A) 0.6 - 1.1 cm    LVIDs 6.05 (A) 2.1 - 4.0 cm    FS 6 28 - 44 %    LA volume 116.45 cm3    Sinus 2.66 cm    STJ 2.47 cm    Ascending aorta 2.54 cm    LV mass 425.12 g    LA size 4.46 cm    TAPSE 1.10 cm    Left Ventricle Relative Wall Thickness 0.45 cm    AV mean gradient 1 mmHg    AV valve area 1.05 cm2    AV Velocity Ratio 0.41     AV index (prosthetic) 0.35     MV valve area p 1/2 method 4.37 cm2    E/A ratio 3.52     Mean e' 0.06 m/s    E wave deceleration time 173.52 msec    IVRT 51.38 msec    LVOT diameter 1.95 cm    LVOT area 3.0 cm2    LVOT peak marcus 0.32 m/s    LVOT peak VTI 3.60 cm    Ao peak marcus 0.78 m/s    Ao VTI 10.2 cm    Mr max marcus 4.80 m/s    LVOT stroke volume 10.75 cm3    AV peak gradient 2 mmHg    E/E' ratio 18.55 m/s    MV Peak E Marcus 1.02 m/s    TR Max Marcus 4.77 m/s     MV stenosis pressure 1/2 time 50.32 ms    MV Peak A Marcus 0.29 m/s    LV Systolic Volume 183.41 mL    LV Systolic Volume Index 114.6 mL/m2    LV Diastolic Volume 209.62 mL    LV Diastolic Volume Index 131.01 mL/m2    LA Volume Index 72.8 mL/m2    LV Mass Index 266 g/m2    RA Major Axis 6.43 cm    Left Atrium Minor Axis 6.44 cm    Left Atrium Major Axis 6.36 cm    Triscuspid Valve Regurgitation Peak Gradient 91 mmHg    RA Width 5.22 cm    Right Atrial Pressure (from IVC) 15 mmHg    EF 15 %    TV rest pulmonary artery pressure 106 mmHg    Narrative    · The left ventricle is severely enlarged with concentric hypertrophy and   severely decreased systolic function.  · The estimated ejection fraction is 15%.  · Severe left atrial enlargement.  · Severe right atrial enlargement.  · Grade III left ventricular diastolic dysfunction.  · The estimated PA systolic pressure is 106 mmHg.  · Normal right ventricular size with mildly reduced right ventricular   systolic function.  · There is pulmonary hypertension.  · Elevated central venous pressure (15 mmHg).  · Moderate to severe tricuspid regurgitation.  · There is severe left ventricular global hypokinesis.  · Moderate pulmonic regurgitation.  · Moderate mitral regurgitation.  · There is a small right pleural effusion.        Assessment and Plan:       * Hemoptysis  -Likely in setting of fluid overload  -Resume AC as tolerated (patient has Eliquis on home med list, but says it's prescribed as only once a day)    9/25/22  Pulmonology consulted, recommend holding OAC given hemoptysis    Anemia  -Hgb range 7-8  -Transfuse prn, as per hospital medicine      Tobacco use  -Denies recent usage    Non compliance w medication regimen  -Counseled on compliance    Cocaine abuse  UDS negative  Denies recent use    Acute on chronic systolic congestive heart failure  -Patient with known CHF, EF 25% by last echo in Care Everywhere  -Grossly overloaded on exam  -Continue IV diuresis  -Resume  BB if UDS negative  -Will attempt to add ARB vs Entresto pending BP trend  -Repeat Echo pending  -Patient with history of medication non-compliance  -Counseled on dietary and fluid restrictions    9/24/22  ED is negative for cocaine, metoprolol started  Repeat echo with EF 15%, moderate to severe TR, RVSP 106 mmhg-> consistent with volume overload   Continue IV diuresis   Monitor urine output  Blood pressure labile  Will need ischemia evaluation with R/LHC during this admission     9/25/22  Beta-blocker held due to hypotension  Lactate 2.2, higher limit of normal   Will try metolazone 2.5 mg and assess output   Will consider starting dobutamine if still decrease urine output  R/LHC possibly tomorrow (tentative), pending patient's volume status  Keep NPO at midnight          VTE Risk Mitigation (From admission, onward)         Ordered     Place sequential compression device  Until discontinued         09/23/22 0539     IP VTE HIGH RISK PATIENT  Once         09/23/22 0539                Eli Villagomez MD  Cardiology  [facility]

## 2022-09-26 NOTE — ASSESSMENT & PLAN NOTE
-Patient with known CHF, EF 25% by last echo in Care Everywhere  -Grossly overloaded on exam  -Continue IV diuresis  -Resume BB if UDS negative  -Will attempt to add ARB vs Entresto pending BP trend  -Repeat Echo pending  -Patient with history of medication non-compliance  -Counseled on dietary and fluid restrictions    9/24/22  ED is negative for cocaine, metoprolol started  Repeat echo with EF 15%, moderate to severe TR, RVSP 106 mmhg-> consistent with volume overload   Continue IV diuresis   Monitor urine output  Blood pressure labile  Will need ischemia evaluation with R/LHC during this admission     9/25/22  Beta-blocker held due to hypotension  Lactate 2.2, higher limit of normal   Will try metolazone 2.5 mg and assess output   Will consider starting dobutamine if still decrease urine output  R/LHC possibly tomorrow (tentative), pending patient's volume status  Keep NPO at midnight

## 2022-09-26 NOTE — ASSESSMENT & PLAN NOTE
-EF 25% on echo done in June 2022 at [facility]  -repeat echo here  -Lasix 40 mg IV BID x 2 doses, re-evaluate  -CHF pathway    9/24:  Patient is identified as having Combined Systolic and Diastolic heart failure that is Acute on chronic. CHF is currently uncontrolled due to Rales/crackles on pulmonary exam and Pulmonary edema/pleural effusion on CXR. Latest ECHO performed and demonstrates- Results for orders placed during the hospital encounter of 09/22/22    Echo    Interpretation Summary  · The left ventricle is severely enlarged with concentric hypertrophy and severely decreased systolic function.  · The estimated ejection fraction is 15%.  · Severe left atrial enlargement.  · Severe right atrial enlargement.  · Grade III left ventricular diastolic dysfunction.  · The estimated PA systolic pressure is 106 mmHg.  · Normal right ventricular size with mildly reduced right ventricular systolic function.  · There is pulmonary hypertension.  · Elevated central venous pressure (15 mmHg).  · Moderate to severe tricuspid regurgitation.  · There is severe left ventricular global hypokinesis.  · Moderate pulmonic regurgitation.  · Moderate mitral regurgitation.  · There is a small right pleural effusion.  . Continue Beta Blocker and Furosemide and monitor clinical status closely. Monitor on telemetry. Patient is on CHF pathway.  Monitor strict Is&Os and daily weights.  Place on fluid restriction of 1.5 L. Continue to stress to patient importance of self efficacy and  on diet for CHF. Last BNP reviewed- and noted below   Recent Labs   Lab 09/26/22  0615   BNP 3,048*     Lasix IV  Strict I&Os  Daily weights  1500ml fluid restriction  2gm Na diet  Cards consult   Plan for R/LHC on Monday 9/25:  Episode of chest pain this am  Trops negative  Cont IV diuresis with Lasix.  Plan for R/LHC tomorrow  Cards following  Hgb stable and improved to 7.9 despite patient coughing up bloody sputum and as stated above likely from right  heart failure and pulm edema..     9/26:  Cont Iv lasix  Needs improved volume status prior to MPI stress test

## 2022-09-26 NOTE — ASSESSMENT & PLAN NOTE
-Patient with known CHF, EF 25% by last echo in Care Everywhere  -Grossly overloaded on exam  -Continue IV diuresis  -Resume BB if UDS negative  -Will attempt to add ARB vs Entresto pending BP trend  -Repeat Echo pending  -Patient with history of medication non-compliance  -Counseled on dietary and fluid restrictions    9/24/22  ED is negative for cocaine, metoprolol started  Repeat echo with EF 15%, moderate to severe TR, RVSP 106 mmhg-> consistent with volume overload   Continue IV diuresis   Monitor urine output  Blood pressure labile  Will need ischemia evaluation with R/LHC during this admission     9/25/22  Beta-blocker held due to hypotension  Lactate 2.2, higher limit of normal   Will try metolazone 2.5 mg and assess output   Will consider starting dobutamine if still decrease urine output  R/LHC possibly tomorrow (tentative), pending patient's volume status  Keep NPO at midnight    9/26/22  -BNP still elevated  -Continue IV diuresis   -Limited with OMT given borderline BP  -Ischemic evaluation with MPI stress test once hemoptysis/volume status improved

## 2022-09-26 NOTE — SUBJECTIVE & OBJECTIVE
Interval History:     09/26: seen and examined: some streaks bloody sputum, chest Ct reviewed: T max 98.6F, on RA, Abx: CEFTRIAXONE + DOXY      Respiratory ROS: positive for - cough and hemoptysis  negative for - orthopnea, pleuritic pain, shortness of breath, tachypnea, or wheezing      Objective:     Vital Signs (Most Recent):  Temp: 97 °F (36.1 °C) (09/26/22 1228)  Pulse: (!) 115 (09/26/22 1228)  Resp: 20 (09/26/22 1228)  BP: 105/72 (09/26/22 0754)  SpO2: 100 % (09/26/22 1228)   Vital Signs (24h Range):  Temp:  [97 °F (36.1 °C)-98.4 °F (36.9 °C)] 97 °F (36.1 °C)  Pulse:  [] 115  Resp:  [15-20] 20  SpO2:  [96 %-100 %] 100 %  BP: (103-111)/(62-79) 105/72     Weight: 56.7 kg (125 lb)  Body mass index is 21.46 kg/m².      Intake/Output Summary (Last 24 hours) at 9/26/2022 1318  Last data filed at 9/26/2022 0805  Gross per 24 hour   Intake 0 ml   Output --   Net 0 ml       Physical Exam  Vitals and nursing note reviewed.   Constitutional:       General: She is not in acute distress.     Appearance: She is well-developed. She is ill-appearing.       HENT:      Head: Normocephalic and atraumatic.      Nose: Nose normal.   Eyes:      Extraocular Movements: Extraocular movements intact.   Cardiovascular:      Rate and Rhythm: Regular rhythm. Tachycardia present.      Pulses: Normal pulses.      Heart sounds: Normal heart sounds.   Pulmonary:      Effort: Pulmonary effort is normal.      Breath sounds: Normal breath sounds. No stridor.   Abdominal:      General: Bowel sounds are normal. There is no distension.      Palpations: Abdomen is soft.   Musculoskeletal:         General: No signs of injury. Normal range of motion.      Cervical back: Normal range of motion and neck supple.   Skin:     General: Skin is warm and dry.      Capillary Refill: Capillary refill takes 2 to 3 seconds.   Neurological:      General: No focal deficit present.      Mental Status: She is alert and oriented to person, place, and time.    Psychiatric:         Mood and Affect: Mood normal.         Behavior: Behavior normal.       Vents:       Lines/Drains/Airways       Peripheral Intravenous Line  Duration                  Peripheral IV - Single Lumen 09/24/22 2153 22 G Anterior;Distal;Right Forearm 1 day         Peripheral IV - Single Lumen 09/26/22 0652 22 G Left;Posterior Hand <1 day                    Significant Labs:    CBC/Anemia Profile:  Recent Labs   Lab 09/25/22  0915 09/26/22  0827   WBC 5.62 5.44   HGB 7.9* 8.0*   HCT 27.7* 28.2*    225   MCV 79* 81*   RDW 25.5* 25.6*        Chemistries:  Recent Labs   Lab 09/25/22  0911 09/26/22  0827    135*   K 3.5 4.2    103   CO2 21* 21*   BUN 16 16   CREATININE 0.8 0.8   CALCIUM 8.1* 8.5*   ALBUMIN 2.4* 2.5*   PROT 5.7* 5.8*   BILITOT 1.9* 2.0*   ALKPHOS 117 73   ALT 13 13   AST 18 20       Blood Culture:   Recent Labs   Lab 09/25/22  1721 09/25/22  1722   LABBLOO No Growth to date No Growth to date     Respiratory Culture: No results for input(s): GSRESP, RESPIRATORYC in the last 48 hours.  All pertinent labs within the past 24 hours have been reviewed.    Significant Imaging:  I have reviewed all pertinent imaging results/findings within the past 24 hours.    X-Ray Chest AP Portable  Narrative: EXAMINATION:  XR CHEST AP PORTABLE    CLINICAL HISTORY:  CHF;    FINDINGS:  Single view of the chest.  Comparison 09/23/2022.    Cardiac silhouette is enlarged but stable.  Patchy infiltrate left lower lobe concerning for airspace disease or aspiration.  Trace left pleural effusion suspected.  No pneumothorax.  Bones appear intact.  Impression: See above.    Electronically signed by: Clarence Alicea MD  Date:    09/26/2022  Time:    07:25     EXAMINATION:  US LOWER EXTREMITY ARTERIES RIGHT     CLINICAL HISTORY:  History of RLE arterial embolism;     TECHNIQUE:  Bilateral lower extremity arterial duplex ultrasound examination performed. Multiple gray scale and color doppler images were  obtained in addition to waveform analysis.     COMPARISON:  No dedicated priors.     FINDINGS:  The peak systolic velocities on the right are as follows, in centimeters/second:     Common femoral artery: 32, triphasic     Deep femoral artery: 28, triphasic     Superficial femoral artery, proximal: 40, triphasic     Superficial femoral artery, mid portion: 62, triphasic     Superficial femoral artery, distal: 30, triphasic     Popliteal artery: 22-30, triphasic     Posterior tibial artery: 42, triphasic     Anterior tibial artery: 16, triphasic.     Peroneal artery: Thirty-three, triphasic     Spectral broadening throughout but without tardus parvus waveforms.     Impression:     Low velocities throughout, a nonspecific finding, correlation with cardiac output.  Further evaluation for upstream stenosis as warranted, however the waveforms appear fairly well preserved.     No arterial occlusion or hemodynamically significant stenosis.    EXAMINATION:  US LOWER EXTREMITY VEINS RIGHT     CLINICAL HISTORY:  Pain in right leg     TECHNIQUE:  Duplex and color flow Doppler evaluation and graded compression of the right lower extremity veins was performed.     COMPARISON:  None     FINDINGS:  Right thigh veins: The common femoral, femoral, popliteal, upper greater saphenous, and deep femoral veins are patent and free of thrombus. The veins are normally compressible and have normal phasic flow and augmentation response.     Right calf veins: The visualized calf veins are patent.     Contralateral CFV: The contralateral (left) common femoral vein is patent and free of thrombus.     Miscellaneous: High pulsatility waveforms throughout the lower extremity as may occur with cardiac dysfunction.     Impression:     No evidence of deep venous thrombosis within the right lower extremity.

## 2022-09-26 NOTE — PLAN OF CARE
Pt remains free from falls/injuries this shift. Safety precautions maintained. Pain managed with PRN meds. Pt still coughing up blood tinged sputum. No s/s of acute distress noted. Will continue to monitor. Chart check completed.

## 2022-09-26 NOTE — ASSESSMENT & PLAN NOTE
Patient is identified as having Combined Systolic and Diastolic heart failure that is Acute on chronic. CHF is currently uncontrolled due to Rales/crackles on pulmonary exam and Pulmonary edema/pleural effusion on CXR. Latest ECHO performed and demonstrates- Results for orders placed during the hospital encounter of 09/22/22    Echo    Interpretation Summary  · The left ventricle is severely enlarged with concentric hypertrophy and severely decreased systolic function.  · The estimated ejection fraction is 15%.  · Severe left atrial enlargement.  · Severe right atrial enlargement.  · Grade III left ventricular diastolic dysfunction.  · The estimated PA systolic pressure is 106 mmHg.  · Normal right ventricular size with mildly reduced right ventricular systolic function.  · There is pulmonary hypertension.  · Elevated central venous pressure (15 mmHg).  · Moderate to severe tricuspid regurgitation.  · There is severe left ventricular global hypokinesis.  · Moderate pulmonic regurgitation.  · Moderate mitral regurgitation.  · There is a small right pleural effusion.  . Continue Furosemide and monitor clinical status closely. Monitor on telemetry. Patient is on CHF pathway.  Monitor strict Is&Os and daily weights.  Place on fluid restriction of 1.5 L. Continue to stress to patient importance of self efficacy and  on diet for CHF. Last BNP reviewed- and noted below   Recent Labs   Lab 09/26/22  0615   BNP 3,048*   .  Strict I&Os.  IV Lasix.

## 2022-09-26 NOTE — ASSESSMENT & PLAN NOTE
-Likely in setting of fluid overload  -Resume AC as tolerated (patient has Eliquis on home med list, but says it's prescribed as only once a day)    9/25/22  Pulmonology consulted, recommend holding OAC given hemoptysis

## 2022-09-26 NOTE — ASSESSMENT & PLAN NOTE
arterial lower extremity thrombosis status post embolectomy June 2022  HOLD Eliquis in the setting of active hemoptysis

## 2022-09-26 NOTE — ASSESSMENT & PLAN NOTE
-holding Eliquis  -BP stable  -CTA chest negative for PE or masses    9/24:  Cont holding Eliquis  Differentials include CHF-Pulm edema vs GI issue. CTA chest negative for masses  R/LHC on Monday per Cards  Hgb remains stable    9/25:  Hgb stable and improved to 7.9 despite patient coughing up bloody sputum and as stated above likely from right heart failure and pulm edema.    9/26:  Cont IV diuresis and once volume status improved plan for MPI stress test per Cards. Hgb improving. Procal negative but CRP elevated treating for PNA empirically. Cont holding Eliquis and send sputum to micro.

## 2022-09-26 NOTE — PROGRESS NOTES
[facility] Medicine  Progress Note    Patient Name: Teresa Echols  MRN: [id number]  Patient Class: IP- Inpatient   Admission Date: 9/22/2022  Length of Stay: 1 days  Attending Physician: Irvin Escalante MD  Primary Care Provider: Juan Contreras MD        Subjective:     Principal Problem:Hemoptysis        HPI:  Ms. Echols is a 37-year-old  female with PMH significant for systolic CHF (EF 25% on echo done June 2022), chronic cocaine user, right lower extremity DVT status post thrombectomy on in June), presented to [facility] ED complaining of coughing up blood the past 2-3 months, since the thrombectomy procedure.  Patient on Xarelto.  States that she has been to multiple hospitals for the same issue, was told that there is nothing abnormal and was sent home.  Denies chest pain, but complains of SOB, worsen with exertion.  Denies fever, chills.  /68.  Afebrile.  HR .  SaO2 100% on 2 L O2 N/C (not home oxygen dependent).  Laboratory workup reveals hemoglobin 7.3, MCV 81, INR 1.4.  D-dimer elevated 2.65.  CTA chest negative for PE, but reveals enlarged cardiac silhouette, right heart strain, increased vascular congestion.  BNP elevated 3610.  Troponin 0.024.  UDS pending.  Received Lasix 40 mg IV x1 in the ED.    Admitting diagnosis:  Hemoptysis.  Acute on chronic systolic CHF.  Chronic cocaine user.  History of right leg DVT, status post thrombectomy in June 2022.  On Xarelto.      Overview/Hospital Course:  9/23: 37-year-old female admitted for hemoptysis, upon further investigation patient has blood-tinged sputum not vomitus.  Iron studies pending.  Since stopping Eliquis this hospitalization she has not had any blood-tinged sputum production.  BB resumed and will further diurese.  Monitor H/ H    9/24: Iron studies c/w with anemia, although Hgb improved. Continues to cough up blood tinged sputum which is likely related to CHF and pulm edema. R/C Monday  per Cards. Diurese and monitor strict I&Os. CT chest showed concern for PNA vs edema but no signs of lesion. No leukocytosis, afebrile. Cont treating UTI.    9/25: Episode of chest pain this am. Trops negative. Cont IV diuresis with Lasix. Plan for R/LHC tomorrow. Cards following. Hgb stable and improved to 7.9 despite patient coughing up bloody sputum and as stated above likely from right heart failure and pulm edema.    9/26: Cont IV diuresis and once volume status improved plan for MPI stress test per Cards. Hgb improving. Procal negative but CRP elevated treating for PNA empirically. Cont holding Eliquis and send sputum to micro.       Interval History: Cont IV diuresis and once volume status improved plan for MPI stress test per Cards. Hgb improving. Procal negative but CRP elevated treating for PNA empirically. Cont holding Eliquis and send sputum to micro.     Review of Systems   Constitutional:  Positive for fatigue. Negative for chills and fever.   HENT: Negative.  Negative for congestion, rhinorrhea, sore throat and trouble swallowing.    Eyes: Negative.    Respiratory:  Positive for cough (blood) and shortness of breath. Negative for wheezing.    Cardiovascular:  Negative for chest pain, palpitations and leg swelling.   Gastrointestinal: Negative.  Negative for abdominal pain, diarrhea, nausea and vomiting.   Endocrine: Negative.    Genitourinary: Negative.  Negative for dysuria and flank pain.   Musculoskeletal: Negative.  Negative for back pain.   Skin: Negative.  Negative for rash.   Allergic/Immunologic: Negative.    Neurological: Negative.  Negative for speech difficulty, weakness, numbness and headaches.   Hematological: Negative.    Psychiatric/Behavioral: Negative.  Negative for hallucinations. The patient is not nervous/anxious.    All other systems reviewed and are negative.  Objective:     Vital Signs (Most Recent):  Temp: 97 °F (36.1 °C) (09/26/22 1228)  Pulse: 104 (09/26/22 1332)  Resp: 20  (09/26/22 1228)  BP: 105/72 (09/26/22 0754)  SpO2: 100 % (09/26/22 1228) Vital Signs (24h Range):  Temp:  [97 °F (36.1 °C)-98.4 °F (36.9 °C)] 97 °F (36.1 °C)  Pulse:  [] 104  Resp:  [15-20] 20  SpO2:  [96 %-100 %] 100 %  BP: (103-111)/(62-79) 105/72     Weight: 56.7 kg (125 lb)  Body mass index is 21.46 kg/m².    Intake/Output Summary (Last 24 hours) at 9/26/2022 1545  Last data filed at 9/26/2022 1200  Gross per 24 hour   Intake 380 ml   Output --   Net 380 ml      Physical Exam  Vitals and nursing note reviewed.   Constitutional:       General: She is awake. She is not in acute distress.     Appearance: She is ill-appearing.      Interventions: Nasal cannula in place.      Comments: Currently on 2 L O2 N/C   HENT:      Head: Normocephalic and atraumatic.      Mouth/Throat:      Mouth: Mucous membranes are moist.   Eyes:      General: No scleral icterus.     Conjunctiva/sclera: Conjunctivae normal.   Cardiovascular:      Rate and Rhythm: Normal rate and regular rhythm.      Heart sounds: No murmur heard.  Pulmonary:      Effort: Pulmonary effort is normal. No respiratory distress.      Breath sounds: No wheezing, rhonchi or rales.      Comments: Blood tinged sputum  Abdominal:      Palpations: Abdomen is soft.      Tenderness: There is no abdominal tenderness.   Musculoskeletal:         General: No swelling (Trace edema bilateral ankles). Normal range of motion.      Cervical back: Normal range of motion and neck supple.   Skin:     General: Skin is warm.   Neurological:      General: No focal deficit present.      Mental Status: She is alert and oriented to person, place, and time. Mental status is at baseline.   Psychiatric:         Attention and Perception: Attention normal.         Mood and Affect: Affect is flat.         Speech: Speech normal.         Behavior: Behavior is cooperative.       Significant Labs: All pertinent labs within the past 24 hours have been reviewed.    Significant Imaging: I have  reviewed all pertinent imaging results/findings within the past 24 hours.      Assessment/Plan:      * Hemoptysis  -holding Eliquis  -BP stable  -CTA chest negative for PE or masses    9/24:  Cont holding Eliquis  Differentials include CHF-Pulm edema vs GI issue. CTA chest negative for masses  R/LHC on Monday per Cards  Hgb remains stable    9/25:  Hgb stable and improved to 7.9 despite patient coughing up bloody sputum and as stated above likely from right heart failure and pulm edema.    9/26:  Cont IV diuresis and once volume status improved plan for MPI stress test per Cards. Hgb improving. Procal negative but CRP elevated treating for PNA empirically. Cont holding Eliquis and send sputum to micro.     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        Non compliance w medication regimen         Cocaine abuse  -per Care Everywhere, has been snorting cocaine since age 18  -UDS pending, hold BB until resulted    9/24:  UDS negative  BB resumed     Acute on chronic systolic congestive heart failure  -EF 25% on echo done in June 2022 at [facility]  -repeat echo here  -Lasix 40 mg IV BID x 2 doses, re-evaluate  -CHF pathway    9/24:  Patient is identified as having Combined Systolic and Diastolic heart failure that is Acute on chronic. CHF is currently uncontrolled due to Rales/crackles on pulmonary exam and Pulmonary edema/pleural  effusion on CXR. Latest ECHO performed and demonstrates- Results for orders placed during the hospital encounter of 09/22/22    Echo    Interpretation Summary  · The left ventricle is severely enlarged with concentric hypertrophy and severely decreased systolic function.  · The estimated ejection fraction is 15%.  · Severe left atrial enlargement.  · Severe right atrial enlargement.  · Grade III left ventricular diastolic dysfunction.  · The estimated PA systolic pressure is 106 mmHg.  · Normal right ventricular size with mildly reduced right ventricular systolic function.  · There is pulmonary hypertension.  · Elevated central venous pressure (15 mmHg).  · Moderate to severe tricuspid regurgitation.  · There is severe left ventricular global hypokinesis.  · Moderate pulmonic regurgitation.  · Moderate mitral regurgitation.  · There is a small right pleural effusion.  . Continue Beta Blocker and Furosemide and monitor clinical status closely. Monitor on telemetry. Patient is on CHF pathway.  Monitor strict Is&Os and daily weights.  Place on fluid restriction of 1.5 L. Continue to stress to patient importance of self efficacy and  on diet for CHF. Last BNP reviewed- and noted below   Recent Labs   Lab 09/26/22  0615   BNP 3,048*     Lasix IV  Strict I&Os  Daily weights  1500ml fluid restriction  2gm Na diet  Cards consult   Plan for R/LHC on Monday 9/25:  Episode of chest pain this am  Trops negative  Cont IV diuresis with Lasix.  Plan for R/LHC tomorrow  Cards following  Hgb stable and improved to 7.9 despite patient coughing up bloody sputum and as stated above likely from right heart failure and pulm edema..     9/26:  Cont Iv lasix  Needs improved volume status prior to MPI stress test        VTE Risk Mitigation (From admission, onward)         Ordered     Place sequential compression device  Until discontinued         09/23/22 0539     IP VTE HIGH RISK PATIENT  Once         09/23/22 0539                 Discharge Planning   PEPITO:      Code Status: Full Code   Is the patient medically ready for discharge?:     Reason for patient still in hospital (select all that apply): Patient trending condition and Consult recommendations  Discharge Plan A: Home with family                  Luna Escalante NP  Department of Hospital Medicine   [facility] - Med Surg

## 2022-09-27 LAB
BASOPHILS # BLD AUTO: 0.03 K/UL (ref 0–0.2)
BASOPHILS NFR BLD: 0.5 % (ref 0–1.9)
DIFFERENTIAL METHOD: ABNORMAL
EOSINOPHIL # BLD AUTO: 0.3 K/UL (ref 0–0.5)
EOSINOPHIL NFR BLD: 4.7 % (ref 0–8)
ERYTHROCYTE [DISTWIDTH] IN BLOOD BY AUTOMATED COUNT: 25 % (ref 11.5–14.5)
HCT VFR BLD AUTO: 27.1 % (ref 37–48.5)
HGB BLD-MCNC: 7.8 G/DL (ref 12–16)
IMM GRANULOCYTES # BLD AUTO: 0.02 K/UL (ref 0–0.04)
IMM GRANULOCYTES NFR BLD AUTO: 0.4 % (ref 0–0.5)
LYMPHOCYTES # BLD AUTO: 1.8 K/UL (ref 1–4.8)
LYMPHOCYTES NFR BLD: 32.7 % (ref 18–48)
MCH RBC QN AUTO: 22.7 PG (ref 27–31)
MCHC RBC AUTO-ENTMCNC: 28.8 G/DL (ref 32–36)
MCV RBC AUTO: 79 FL (ref 82–98)
MONOCYTES # BLD AUTO: 0.4 K/UL (ref 0.3–1)
MONOCYTES NFR BLD: 7.2 % (ref 4–15)
NEUTROPHILS # BLD AUTO: 3 K/UL (ref 1.8–7.7)
NEUTROPHILS NFR BLD: 54.5 % (ref 38–73)
NRBC BLD-RTO: 0 /100 WBC
PLATELET # BLD AUTO: 221 K/UL (ref 150–450)
PMV BLD AUTO: 10.1 FL (ref 9.2–12.9)
RBC # BLD AUTO: 3.43 M/UL (ref 4–5.4)
WBC # BLD AUTO: 5.53 K/UL (ref 3.9–12.7)

## 2022-09-27 PROCEDURE — 36415 COLL VENOUS BLD VENIPUNCTURE: CPT | Performed by: NURSE PRACTITIONER

## 2022-09-27 PROCEDURE — 25000003 PHARM REV CODE 250: Performed by: NURSE PRACTITIONER

## 2022-09-27 PROCEDURE — 63600175 PHARM REV CODE 636 W HCPCS: Performed by: NURSE PRACTITIONER

## 2022-09-27 PROCEDURE — 99232 PR SUBSEQUENT HOSPITAL CARE,LEVL II: ICD-10-PCS | Mod: ,,, | Performed by: INTERNAL MEDICINE

## 2022-09-27 PROCEDURE — 21400001 HC TELEMETRY ROOM

## 2022-09-27 PROCEDURE — 99232 SBSQ HOSP IP/OBS MODERATE 35: CPT | Mod: ,,, | Performed by: INTERNAL MEDICINE

## 2022-09-27 PROCEDURE — 99232 PR SUBSEQUENT HOSPITAL CARE,LEVL II: ICD-10-PCS | Mod: ,,, | Performed by: PHYSICIAN ASSISTANT

## 2022-09-27 PROCEDURE — 99232 SBSQ HOSP IP/OBS MODERATE 35: CPT | Mod: ,,, | Performed by: PHYSICIAN ASSISTANT

## 2022-09-27 PROCEDURE — 25000003 PHARM REV CODE 250: Performed by: INTERNAL MEDICINE

## 2022-09-27 PROCEDURE — 85025 COMPLETE CBC W/AUTO DIFF WBC: CPT | Performed by: NURSE PRACTITIONER

## 2022-09-27 PROCEDURE — 63600175 PHARM REV CODE 636 W HCPCS: Performed by: STUDENT IN AN ORGANIZED HEALTH CARE EDUCATION/TRAINING PROGRAM

## 2022-09-27 PROCEDURE — 11000001 HC ACUTE MED/SURG PRIVATE ROOM

## 2022-09-27 RX ORDER — LINEZOLID 600 MG/1
600 TABLET, FILM COATED ORAL EVERY 12 HOURS
Status: DISCONTINUED | OUTPATIENT
Start: 2022-09-27 | End: 2022-09-30 | Stop reason: HOSPADM

## 2022-09-27 RX ORDER — MIDODRINE HYDROCHLORIDE 5 MG/1
5 TABLET ORAL 2 TIMES DAILY WITH MEALS
Status: DISCONTINUED | OUTPATIENT
Start: 2022-09-27 | End: 2022-09-29

## 2022-09-27 RX ADMIN — GUAIFENESIN 200 MG: 200 SOLUTION ORAL at 05:09

## 2022-09-27 RX ADMIN — LINEZOLID 600 MG: 600 TABLET, FILM COATED ORAL at 08:09

## 2022-09-27 RX ADMIN — DOXYCYCLINE HYCLATE 100 MG: 100 TABLET, COATED ORAL at 09:09

## 2022-09-27 RX ADMIN — MIDODRINE HYDROCHLORIDE 5 MG: 5 TABLET ORAL at 05:09

## 2022-09-27 RX ADMIN — CEFTRIAXONE 1 G: 1 INJECTION, SOLUTION INTRAVENOUS at 05:09

## 2022-09-27 RX ADMIN — QUETIAPINE FUMARATE 100 MG: 100 TABLET ORAL at 08:09

## 2022-09-27 RX ADMIN — FUROSEMIDE 40 MG: 10 INJECTION, SOLUTION INTRAVENOUS at 08:09

## 2022-09-27 RX ADMIN — DOXYCYCLINE HYCLATE 100 MG: 100 TABLET, COATED ORAL at 08:09

## 2022-09-27 RX ADMIN — ATORVASTATIN CALCIUM 40 MG: 40 TABLET, FILM COATED ORAL at 09:09

## 2022-09-27 NOTE — PROGRESS NOTES
[facility]  Pulmonology  Progress Note    Patient Name: Teresa Echols  MRN: [id number]  Admission Date: 9/22/2022  Hospital Length of Stay: 2 days  Code Status: Full Code  Attending Provider: Simeon Maldonado MD  Primary Care Provider: Juan Contreras MD   Principal Problem: Hemoptysis    Subjective:     Interval History:     09/27: seen and examined; Feels better occasional old dark blood streaks, T max: 98F, on RA Abx CEFTRIAXONE + DOXY, CXR yesterday reveiwed: may need IVC if prolong anticoagulation interruption      Respiratory ROS: positive for - cough and hemoptysis  negative for - orthopnea, pleuritic pain, shortness of breath, sputum changes, stridor, tachypnea, or wheezing     Objective:     Vital Signs (Most Recent):  Temp: 97.8 °F (36.6 °C) (09/27/22 0840)  Pulse: (!) 113 (09/27/22 0845)  Resp: 16 (09/27/22 0845)  BP: 97/68 (09/27/22 0845)  SpO2: 98 % (09/27/22 0845)   Vital Signs (24h Range):  Temp:  [97 °F (36.1 °C)-98 °F (36.7 °C)] 97.8 °F (36.6 °C)  Pulse:  [100-125] 113  Resp:  [15-20] 16  SpO2:  [96 %-100 %] 98 %  BP: ()/(48-81) 97/68     Weight: 56.7 kg (125 lb)  Body mass index is 21.46 kg/m².      Intake/Output Summary (Last 24 hours) at 9/27/2022 1009  Last data filed at 9/26/2022 1800  Gross per 24 hour   Intake 1120 ml   Output --   Net 1120 ml       Physical Exam  Vitals and nursing note reviewed.   Constitutional:       General: She is not in acute distress.     Appearance: She is well-developed. She is ill-appearing.       HENT:      Head: Normocephalic and atraumatic.      Nose: Nose normal.   Eyes:      Extraocular Movements: Extraocular movements intact.   Cardiovascular:      Rate and Rhythm: Normal rate and regular rhythm.      Pulses: Normal pulses.      Heart sounds: Normal heart sounds.   Pulmonary:      Effort: Pulmonary effort is normal.      Breath sounds: Normal breath sounds. No stridor.   Abdominal:      General: Bowel sounds are normal. There is no distension.       Palpations: Abdomen is soft.   Musculoskeletal:         General: No signs of injury. Normal range of motion.      Cervical back: Normal range of motion and neck supple.   Skin:     General: Skin is warm and dry.      Capillary Refill: Capillary refill takes 2 to 3 seconds.   Neurological:      General: No focal deficit present.      Mental Status: She is alert and oriented to person, place, and time.   Psychiatric:         Mood and Affect: Mood normal.         Behavior: Behavior normal.       Vents:       Lines/Drains/Airways       Peripheral Intravenous Line  Duration                  Peripheral IV - Single Lumen 09/24/22 2153 22 G Anterior;Distal;Right Forearm 2 days         Peripheral IV - Single Lumen 09/26/22 0652 22 G Left;Posterior Hand 1 day                    Significant Labs:    CBC/Anemia Profile:  Recent Labs   Lab 09/26/22  0827 09/27/22  0551   WBC 5.44 5.53   HGB 8.0* 7.8*   HCT 28.2* 27.1*    221   MCV 81* 79*   RDW 25.6* 25.0*        Chemistries:  Recent Labs   Lab 09/26/22  0827   *   K 4.2      CO2 21*   BUN 16   CREATININE 0.8   CALCIUM 8.5*   ALBUMIN 2.5*   PROT 5.8*   BILITOT 2.0*   ALKPHOS 73   ALT 13   AST 20       Blood Culture:   Recent Labs   Lab 09/25/22  1721 09/25/22  1722   LABBLOO No Growth to date  No Growth to date No Growth to date  No Growth to date     POCT Glucose: No results for input(s): POCTGLUCOSE in the last 48 hours.  Respiratory Culture:   Recent Labs   Lab 09/25/22  2137   GSRESP <10 epithelial cells per low power field.  Rare WBC's  Few Gram positive cocci  Rare Gram negative rods   RESPIRATORYC Normal respiratory rubio     Urine Culture: No results for input(s): LABURIN in the last 48 hours.  All pertinent labs within the past 24 hours have been reviewed.    Significant Imaging:  I have reviewed all pertinent imaging results/findings within the past 24 hours.    X-Ray Chest AP Portable  Narrative: EXAMINATION:  XR CHEST AP PORTABLE    CLINICAL  HISTORY:  CHF;    FINDINGS:  Single view of the chest.  Comparison 09/23/2022.    Cardiac silhouette is enlarged but stable.  Patchy infiltrate left lower lobe concerning for airspace disease or aspiration.  Trace left pleural effusion suspected.  No pneumothorax.  Bones appear intact.  Impression: See above.    Electronically signed by: Clarence Alicea MD  Date:    09/26/2022  Time:    07:25       ABG  No results for input(s): PH, PO2, PCO2, HCO3, BE in the last 168 hours.  Assessment/Plan:     * Hemoptysis  Hold Apixaban.  Collect sputum to quantize hemoptysis.  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  Treat CHF.   IR for IVC filter    SOB (shortness of breath)  Target O2 sat 92-94%.  Multifactorial CHF plus suspected bilateral pneumonia.  IV Rocephin doxycycline.  Check procalcitonin CRP.  Send respiratory culture.  Nasal MRSA.  Blood culture.    Cocaine abuse  Cocaine abuse complicated by systolic heart failure and arterial lower extremity thrombosis status post embolectomy June 2022.    Acute on chronic systolic congestive heart failure  Patient is identified as having Combined Systolic and Diastolic heart failure that is Acute on chronic. CHF is currently uncontrolled due to Rales/crackles on pulmonary exam and Pulmonary edema/pleural effusion on CXR. Latest ECHO performed and demonstrates- Results for orders placed during the hospital encounter of 09/22/22    Echo    Interpretation Summary  · The left ventricle is severely enlarged with concentric hypertrophy and severely decreased systolic function.  · The estimated ejection fraction is 15%.  · Severe left atrial enlargement.  · Severe right atrial enlargement.  · Grade III left ventricular diastolic dysfunction.  · The estimated PA systolic pressure is 106 mmHg.  · Normal right  ventricular size with mildly reduced right ventricular systolic function.  · There is pulmonary hypertension.  · Elevated central venous pressure (15 mmHg).  · Moderate to severe tricuspid regurgitation.  · There is severe left ventricular global hypokinesis.  · Moderate pulmonic regurgitation.  · Moderate mitral regurgitation.  · There is a small right pleural effusion.  . Continue Furosemide and monitor clinical status closely. Monitor on telemetry. Patient is on CHF pathway.  Monitor strict Is&Os and daily weights.  Place on fluid restriction of 1.5 L. Continue to stress to patient importance of self efficacy and  on diet for CHF. Last BNP reviewed- and noted below   Recent Labs   Lab 09/26/22  0615   BNP 3,048*   .  Strict I&Os.    IV Lasix.         I have reviewed all labs and imaging studies and compared to previous results. I have also discussed labs with all the teams in the medical care of the patient and my plan is outlined below        Jesus Bowen MD  Pulmonology  [facility] - Med Surg

## 2022-09-27 NOTE — ASSESSMENT & PLAN NOTE
-Patient with known CHF, EF 25% by last echo in Care Everywhere  -Grossly overloaded on exam  -Continue IV diuresis  -Resume BB if UDS negative  -Will attempt to add ARB vs Entresto pending BP trend  -Repeat Echo pending  -Patient with history of medication non-compliance  -Counseled on dietary and fluid restrictions    9/24/22  ED is negative for cocaine, metoprolol started  Repeat echo with EF 15%, moderate to severe TR, RVSP 106 mmhg-> consistent with volume overload   Continue IV diuresis   Monitor urine output  Blood pressure labile  Will need ischemia evaluation with R/LHC during this admission     9/25/22  Beta-blocker held due to hypotension  Lactate 2.2, higher limit of normal   Will try metolazone 2.5 mg and assess output   Will consider starting dobutamine if still decrease urine output  R/LHC possibly tomorrow (tentative), pending patient's volume status  Keep NPO at midnight    9/26/22  -BNP still elevated  -Continue IV diuresis   -Limited with OMT given borderline BP  -Ischemic evaluation with MPI stress test once hemoptysis/volume status improved    9/27/22  -Continue IV diuresis  -Will optimize regimen as tolerated  -Ischemic evaluation once stable

## 2022-09-27 NOTE — SUBJECTIVE & OBJECTIVE
Review of Systems   Constitutional: Positive for malaise/fatigue.   HENT: Negative.     Eyes: Negative.    Cardiovascular:  Positive for dyspnea on exertion.   Respiratory:  Positive for cough, hemoptysis and shortness of breath.    Endocrine: Negative.    Hematologic/Lymphatic: Negative.    Skin: Negative.    Musculoskeletal: Negative.    Gastrointestinal: Negative.    Genitourinary: Negative.    Neurological: Negative.    Psychiatric/Behavioral: Negative.     Allergic/Immunologic: Negative.    Objective:     Vital Signs (Most Recent):  Temp: 97.7 °F (36.5 °C) (09/27/22 1133)  Pulse: 109 (09/27/22 1133)  Resp: 16 (09/27/22 1133)  BP: 102/67 (09/27/22 1133)  SpO2: 100 % (09/27/22 1133) Vital Signs (24h Range):  Temp:  [97.3 °F (36.3 °C)-98.2 °F (36.8 °C)] 97.7 °F (36.5 °C)  Pulse:  [100-125] 109  Resp:  [15-18] 16  SpO2:  [96 %-100 %] 100 %  BP: ()/(48-81) 102/67     Weight: 56.7 kg (125 lb)  Body mass index is 21.46 kg/m².     SpO2: 100 %  O2 Device (Oxygen Therapy): room air      Intake/Output Summary (Last 24 hours) at 9/27/2022 1236  Last data filed at 9/26/2022 1800  Gross per 24 hour   Intake 740 ml   Output --   Net 740 ml       Lines/Drains/Airways       Peripheral Intravenous Line  Duration                  Peripheral IV - Single Lumen 09/24/22 2153 22 G Anterior;Distal;Right Forearm 2 days         Peripheral IV - Single Lumen 09/26/22 0652 22 G Left;Posterior Hand 1 day                    Physical Exam  Vitals and nursing note reviewed.   Constitutional:       General: She is not in acute distress.     Appearance: Normal appearance. She is well-developed. She is not diaphoretic.   HENT:      Head: Normocephalic and atraumatic.   Eyes:      General:         Right eye: No discharge.         Left eye: No discharge.      Pupils: Pupils are equal, round, and reactive to light.   Neck:      Thyroid: No thyromegaly.      Vascular: No JVD.      Trachea: No tracheal deviation.   Cardiovascular:      Rate  and Rhythm: Normal rate and regular rhythm.      Heart sounds: Normal heart sounds, S1 normal and S2 normal. No murmur heard.  Pulmonary:      Effort: Pulmonary effort is normal. No respiratory distress.      Breath sounds: Rales present. No wheezing.   Abdominal:      General: There is no distension.      Palpations: Abdomen is soft.      Tenderness: There is no rebound.   Musculoskeletal:      Cervical back: Neck supple.      Right lower leg: No edema.      Left lower leg: No edema.   Skin:     General: Skin is warm and dry.      Findings: No erythema.   Neurological:      Mental Status: She is alert and oriented to person, place, and time.   Psychiatric:         Mood and Affect: Mood normal.         Behavior: Behavior normal.         Thought Content: Thought content normal.       Significant Labs: CMP   Recent Labs   Lab 09/26/22  0827   *   K 4.2      CO2 21*   GLU 72   BUN 16   CREATININE 0.8   CALCIUM 8.5*   PROT 5.8*   ALBUMIN 2.5*   BILITOT 2.0*   ALKPHOS 73   AST 20   ALT 13   ANIONGAP 11   , CBC   Recent Labs   Lab 09/26/22  0827 09/27/22  0551   WBC 5.44 5.53   HGB 8.0* 7.8*   HCT 28.2* 27.1*    221   , Troponin   Recent Labs   Lab 09/25/22  1721   TROPONINI 0.016   , and All pertinent lab results from the last 24 hours have been reviewed.    Significant Imaging: Echocardiogram: Transthoracic echo (TTE) complete (Cupid Only):   Results for orders placed or performed during the hospital encounter of 09/22/22   Echo   Result Value Ref Range    BSA 1.6 m2    TDI SEPTAL 0.04 m/s    LV LATERAL E/E' RATIO 14.57 m/s    LV SEPTAL E/E' RATIO 25.50 m/s    LA WIDTH 4.80 cm    IVC diameter 2.77 cm    Left Ventricular Outflow Tract Mean Velocity 0.24 cm/s    Left Ventricular Outflow Tract Mean Gradient 0.25 mmHg    TDI LATERAL 0.07 m/s    LVIDd 6.41 (A) 3.5 - 6.0 cm    IVS 1.34 (A) 0.6 - 1.1 cm    Posterior Wall 1.43 (A) 0.6 - 1.1 cm    LVIDs 6.05 (A) 2.1 - 4.0 cm    FS 6 28 - 44 %    LA volume  116.45 cm3    Sinus 2.66 cm    STJ 2.47 cm    Ascending aorta 2.54 cm    LV mass 425.12 g    LA size 4.46 cm    TAPSE 1.10 cm    Left Ventricle Relative Wall Thickness 0.45 cm    AV mean gradient 1 mmHg    AV valve area 1.05 cm2    AV Velocity Ratio 0.41     AV index (prosthetic) 0.35     MV valve area p 1/2 method 4.37 cm2    E/A ratio 3.52     Mean e' 0.06 m/s    E wave deceleration time 173.52 msec    IVRT 51.38 msec    LVOT diameter 1.95 cm    LVOT area 3.0 cm2    LVOT peak marcus 0.32 m/s    LVOT peak VTI 3.60 cm    Ao peak marcus 0.78 m/s    Ao VTI 10.2 cm    Mr max marcus 4.80 m/s    LVOT stroke volume 10.75 cm3    AV peak gradient 2 mmHg    E/E' ratio 18.55 m/s    MV Peak E Marcus 1.02 m/s    TR Max Marcus 4.77 m/s    MV stenosis pressure 1/2 time 50.32 ms    MV Peak A Marcus 0.29 m/s    LV Systolic Volume 183.41 mL    LV Systolic Volume Index 114.6 mL/m2    LV Diastolic Volume 209.62 mL    LV Diastolic Volume Index 131.01 mL/m2    LA Volume Index 72.8 mL/m2    LV Mass Index 266 g/m2    RA Major Axis 6.43 cm    Left Atrium Minor Axis 6.44 cm    Left Atrium Major Axis 6.36 cm    Triscuspid Valve Regurgitation Peak Gradient 91 mmHg    RA Width 5.22 cm    Right Atrial Pressure (from IVC) 15 mmHg    EF 15 %    TV rest pulmonary artery pressure 106 mmHg    Narrative    · The left ventricle is severely enlarged with concentric hypertrophy and   severely decreased systolic function.  · The estimated ejection fraction is 15%.  · Severe left atrial enlargement.  · Severe right atrial enlargement.  · Grade III left ventricular diastolic dysfunction.  · The estimated PA systolic pressure is 106 mmHg.  · Normal right ventricular size with mildly reduced right ventricular   systolic function.  · There is pulmonary hypertension.  · Elevated central venous pressure (15 mmHg).  · Moderate to severe tricuspid regurgitation.  · There is severe left ventricular global hypokinesis.  · Moderate pulmonic regurgitation.  · Moderate mitral  regurgitation.  · There is a small right pleural effusion.      , EKG: Reviewed, and X-Ray: CXR: X-Ray Chest 1 View (CXR): No results found for this visit on 09/22/22. and X-Ray Chest PA and Lateral (CXR): No results found for this visit on 09/22/22.

## 2022-09-27 NOTE — ASSESSMENT & PLAN NOTE
-holding Eliquis  -BP stable  -CTA chest negative for PE or masses    9/24:  Cont holding Eliquis  Differentials include CHF-Pulm edema vs GI issue. CTA chest negative for masses  R/LHC on Monday per Cards  Hgb remains stable    9/25:  Hgb stable and improved to 7.9 despite patient coughing up bloody sputum and as stated above likely from right heart failure and pulm edema.    9/26:  Cont IV diuresis and once volume status improved plan for MPI stress test per Cards. Hgb improving. Procal negative but CRP elevated treating for PNA empirically. Cont holding Eliquis and send sputum to micro.     9/27:  Cont IV diuresis and optimize for MPI stress test. Will add midodrine to augment BP because patient definitely needs IV diuresis.

## 2022-09-27 NOTE — PROGRESS NOTES
"[facility]  Cardiology  Progress Note    Patient Name: Teresa Echols  MRN: [id number]  Admission Date: 9/22/2022  Hospital Length of Stay: 2 days  Code Status: Full Code   Attending Physician: Simeon Maldonado MD   Primary Care Physician: Juan Contreras MD  Expected Discharge Date:   Principal Problem:Hemoptysis    Subjective:     Hospital Course:   Ms. Echols is a 37 year old female patient whose current medical conditions include chronic systolic CHF (25% per echo 6/22 in Care Everywhere), history of cocaine abuse, RLE DVT s/p thrombectomy in 6/22, CHALINO, and non-compliance who presented to [facility] ED yesterday with a chief complaint of worsening SOB over the past few days. Associated symptoms included hemoptysis, BLE edema, abdominal bloating, and orthopnea. She denied any associated fever, chills, palpitations, near syncope, or syncope. Initial workup in ED revealed anemia (H/H 7.3/25.8), D-dimer +, and BNP of 3610. CTA negative for PE but did show findings consistent with vascular congestion and right heart strain and patient was subsequently admitted for further evaluation and treatment. Cardiology consulted to assist with management. Patient seen and examined today, resting in bed. Remains SOB. Denies osvaldo chest pain but does admit to some left upper chest "tingling". States she has been having issues with SOB/fluid overload ever since her thrombectomy/hospital admission in June. She claims she has been compliant with her medications, but has been using Lasix prn and taking Eliquis only once daily. Admits to eating 2 bags of potato chips prior to admission. Chart reviewed. Initial troponin negative. Repeat echo pending.      9/24/22- doing well.  Abdomen still.  Reports good urine output.  Lasix has been held.  Patient hypotensive.  Blood thinner stopped due to hemoptysis.  Repeat echo with EF 15%, mod-severe TR, RVSP 106 mmhg    9/25/22- continues to have abdominal distension.  Continues to have " hemoptysis, stable Hgb.  Was not started on OAC during admission.  Lactate 2.2, upper limit of normal.  C/o left sided pain. K borderline. BB stopped due to hypotension. Does not feel it she urinating a lot.    9/26/22-Patient seen and examined today, sitting up in bed. Feels ok. SOB slowly improving. Still with abdominal bloating as well as hemoptysis. Pulmonary on board. Labs reviewed. H/H stable. BNP remains elevated.     9/27/22-Patient seen and examined today, resting in bed. SOB and hemoptysis slowly improving. CXR yesterday concerning for infiltrate, remains on abx. Pulmonary following. Labs reviewed. May need IVC filter.        Review of Systems   Constitutional: Positive for malaise/fatigue.   HENT: Negative.     Eyes: Negative.    Cardiovascular:  Positive for dyspnea on exertion.   Respiratory:  Positive for cough, hemoptysis and shortness of breath.    Endocrine: Negative.    Hematologic/Lymphatic: Negative.    Skin: Negative.    Musculoskeletal: Negative.    Gastrointestinal: Negative.    Genitourinary: Negative.    Neurological: Negative.    Psychiatric/Behavioral: Negative.     Allergic/Immunologic: Negative.    Objective:     Vital Signs (Most Recent):  Temp: 97.7 °F (36.5 °C) (09/27/22 1133)  Pulse: 109 (09/27/22 1133)  Resp: 16 (09/27/22 1133)  BP: 102/67 (09/27/22 1133)  SpO2: 100 % (09/27/22 1133) Vital Signs (24h Range):  Temp:  [97.3 °F (36.3 °C)-98.2 °F (36.8 °C)] 97.7 °F (36.5 °C)  Pulse:  [100-125] 109  Resp:  [15-18] 16  SpO2:  [96 %-100 %] 100 %  BP: ()/(48-81) 102/67     Weight: 56.7 kg (125 lb)  Body mass index is 21.46 kg/m².     SpO2: 100 %  O2 Device (Oxygen Therapy): room air      Intake/Output Summary (Last 24 hours) at 9/27/2022 1236  Last data filed at 9/26/2022 1800  Gross per 24 hour   Intake 740 ml   Output --   Net 740 ml       Lines/Drains/Airways       Peripheral Intravenous Line  Duration                  Peripheral IV - Single Lumen 09/24/22 2153 22 G  Anterior;Distal;Right Forearm 2 days         Peripheral IV - Single Lumen 09/26/22 0652 22 G Left;Posterior Hand 1 day                    Physical Exam  Vitals and nursing note reviewed.   Constitutional:       General: She is not in acute distress.     Appearance: Normal appearance. She is well-developed. She is not diaphoretic.   HENT:      Head: Normocephalic and atraumatic.   Eyes:      General:         Right eye: No discharge.         Left eye: No discharge.      Pupils: Pupils are equal, round, and reactive to light.   Neck:      Thyroid: No thyromegaly.      Vascular: No JVD.      Trachea: No tracheal deviation.   Cardiovascular:      Rate and Rhythm: Normal rate and regular rhythm.      Heart sounds: Normal heart sounds, S1 normal and S2 normal. No murmur heard.  Pulmonary:      Effort: Pulmonary effort is normal. No respiratory distress.      Breath sounds: Rales present. No wheezing.   Abdominal:      General: There is no distension.      Palpations: Abdomen is soft.      Tenderness: There is no rebound.   Musculoskeletal:      Cervical back: Neck supple.      Right lower leg: No edema.      Left lower leg: No edema.   Skin:     General: Skin is warm and dry.      Findings: No erythema.   Neurological:      Mental Status: She is alert and oriented to person, place, and time.   Psychiatric:         Mood and Affect: Mood normal.         Behavior: Behavior normal.         Thought Content: Thought content normal.       Significant Labs: CMP   Recent Labs   Lab 09/26/22  0827   *   K 4.2      CO2 21*   GLU 72   BUN 16   CREATININE 0.8   CALCIUM 8.5*   PROT 5.8*   ALBUMIN 2.5*   BILITOT 2.0*   ALKPHOS 73   AST 20   ALT 13   ANIONGAP 11   , CBC   Recent Labs   Lab 09/26/22  0827 09/27/22  0551   WBC 5.44 5.53   HGB 8.0* 7.8*   HCT 28.2* 27.1*    221   , Troponin   Recent Labs   Lab 09/25/22  1721   TROPONINI 0.016   , and All pertinent lab results from the last 24 hours have been  reviewed.    Significant Imaging: Echocardiogram: Transthoracic echo (TTE) complete (Cupid Only):   Results for orders placed or performed during the hospital encounter of 09/22/22   Echo   Result Value Ref Range    BSA 1.6 m2    TDI SEPTAL 0.04 m/s    LV LATERAL E/E' RATIO 14.57 m/s    LV SEPTAL E/E' RATIO 25.50 m/s    LA WIDTH 4.80 cm    IVC diameter 2.77 cm    Left Ventricular Outflow Tract Mean Velocity 0.24 cm/s    Left Ventricular Outflow Tract Mean Gradient 0.25 mmHg    TDI LATERAL 0.07 m/s    LVIDd 6.41 (A) 3.5 - 6.0 cm    IVS 1.34 (A) 0.6 - 1.1 cm    Posterior Wall 1.43 (A) 0.6 - 1.1 cm    LVIDs 6.05 (A) 2.1 - 4.0 cm    FS 6 28 - 44 %    LA volume 116.45 cm3    Sinus 2.66 cm    STJ 2.47 cm    Ascending aorta 2.54 cm    LV mass 425.12 g    LA size 4.46 cm    TAPSE 1.10 cm    Left Ventricle Relative Wall Thickness 0.45 cm    AV mean gradient 1 mmHg    AV valve area 1.05 cm2    AV Velocity Ratio 0.41     AV index (prosthetic) 0.35     MV valve area p 1/2 method 4.37 cm2    E/A ratio 3.52     Mean e' 0.06 m/s    E wave deceleration time 173.52 msec    IVRT 51.38 msec    LVOT diameter 1.95 cm    LVOT area 3.0 cm2    LVOT peak marcus 0.32 m/s    LVOT peak VTI 3.60 cm    Ao peak marcus 0.78 m/s    Ao VTI 10.2 cm    Mr max marcus 4.80 m/s    LVOT stroke volume 10.75 cm3    AV peak gradient 2 mmHg    E/E' ratio 18.55 m/s    MV Peak E Marcus 1.02 m/s    TR Max Marcus 4.77 m/s    MV stenosis pressure 1/2 time 50.32 ms    MV Peak A Marcus 0.29 m/s    LV Systolic Volume 183.41 mL    LV Systolic Volume Index 114.6 mL/m2    LV Diastolic Volume 209.62 mL    LV Diastolic Volume Index 131.01 mL/m2    LA Volume Index 72.8 mL/m2    LV Mass Index 266 g/m2    RA Major Axis 6.43 cm    Left Atrium Minor Axis 6.44 cm    Left Atrium Major Axis 6.36 cm    Triscuspid Valve Regurgitation Peak Gradient 91 mmHg    RA Width 5.22 cm    Right Atrial Pressure (from IVC) 15 mmHg    EF 15 %    TV rest pulmonary artery pressure 106 mmHg    Narrative    · The  left ventricle is severely enlarged with concentric hypertrophy and   severely decreased systolic function.  · The estimated ejection fraction is 15%.  · Severe left atrial enlargement.  · Severe right atrial enlargement.  · Grade III left ventricular diastolic dysfunction.  · The estimated PA systolic pressure is 106 mmHg.  · Normal right ventricular size with mildly reduced right ventricular   systolic function.  · There is pulmonary hypertension.  · Elevated central venous pressure (15 mmHg).  · Moderate to severe tricuspid regurgitation.  · There is severe left ventricular global hypokinesis.  · Moderate pulmonic regurgitation.  · Moderate mitral regurgitation.  · There is a small right pleural effusion.      , EKG: Reviewed, and X-Ray: CXR: X-Ray Chest 1 View (CXR): No results found for this visit on 09/22/22. and X-Ray Chest PA and Lateral (CXR): No results found for this visit on 09/22/22.    Assessment and Plan:   Patient who presents with decompensated CHF/hemoptysis/PNA. Continue IV diuresis and abx. May need IVC filter with prolonged AC interruption.     * Hemoptysis  -Likely in setting of fluid overload  -Resume AC as tolerated (patient has Eliquis on home med list, but says it's prescribed as only once a day)    9/25/22  Pulmonology consulted, recommend holding OAC given hemoptysis    Anemia  -Hgb range 7-8  -Transfuse prn, as per hospital medicine      Tobacco use  -Denies recent usage    Non compliance w medication regimen  -Counseled on compliance    Cocaine abuse  -UDS negative  -Denies recent use    Acute on chronic systolic congestive heart failure  -Patient with known CHF, EF 25% by last echo in Care Everywhere  -Grossly overloaded on exam  -Continue IV diuresis  -Resume BB if UDS negative  -Will attempt to add ARB vs Entresto pending BP trend  -Repeat Echo pending  -Patient with history of medication non-compliance  -Counseled on dietary and fluid restrictions    9/24/22  ED is negative for  cocaine, metoprolol started  Repeat echo with EF 15%, moderate to severe TR, RVSP 106 mmhg-> consistent with volume overload   Continue IV diuresis   Monitor urine output  Blood pressure labile  Will need ischemia evaluation with R/LHC during this admission     9/25/22  Beta-blocker held due to hypotension  Lactate 2.2, higher limit of normal   Will try metolazone 2.5 mg and assess output   Will consider starting dobutamine if still decrease urine output  R/LHC possibly tomorrow (tentative), pending patient's volume status  Keep NPO at midnight    9/26/22  -BNP still elevated  -Continue IV diuresis   -Limited with OMT given borderline BP  -Ischemic evaluation with MPI stress test once hemoptysis/volume status improved    9/27/22  -Continue IV diuresis  -Will optimize regimen as tolerated  -Ischemic evaluation once stable        VTE Risk Mitigation (From admission, onward)         Ordered     Place sequential compression device  Until discontinued         09/23/22 0539     IP VTE HIGH RISK PATIENT  Once         09/23/22 0539                Zoe Kim PA-C  Cardiology  [facility]

## 2022-09-27 NOTE — PROGRESS NOTES
[facility] Medicine  Progress Note    Patient Name: Teresa Echols  MRN: [id number]  Patient Class: IP- Inpatient   Admission Date: 9/22/2022  Length of Stay: 2 days  Attending Physician: Simeon Maldonado MD  Primary Care Provider: Juan Contreras MD        Subjective:     Principal Problem:Hemoptysis        HPI:  Ms. Echols is a 37-year-old  female with PMH significant for systolic CHF (EF 25% on echo done June 2022), chronic cocaine user, right lower extremity DVT status post thrombectomy on in June), presented to [facility] ED complaining of coughing up blood the past 2-3 months, since the thrombectomy procedure.  Patient on Xarelto.  States that she has been to multiple hospitals for the same issue, was told that there is nothing abnormal and was sent home.  Denies chest pain, but complains of SOB, worsen with exertion.  Denies fever, chills.  /68.  Afebrile.  HR .  SaO2 100% on 2 L O2 N/C (not home oxygen dependent).  Laboratory workup reveals hemoglobin 7.3, MCV 81, INR 1.4.  D-dimer elevated 2.65.  CTA chest negative for PE, but reveals enlarged cardiac silhouette, right heart strain, increased vascular congestion.  BNP elevated 3610.  Troponin 0.024.  UDS pending.  Received Lasix 40 mg IV x1 in the ED.    Admitting diagnosis:  Hemoptysis.  Acute on chronic systolic CHF.  Chronic cocaine user.  History of right leg DVT, status post thrombectomy in June 2022.  On Xarelto.      Overview/Hospital Course:  9/23: 37-year-old female admitted for hemoptysis, upon further investigation patient has blood-tinged sputum not vomitus.  Iron studies pending.  Since stopping Eliquis this hospitalization she has not had any blood-tinged sputum production.  BB resumed and will further diurese.  Monitor H/ H    9/24: Iron studies c/w with anemia, although Hgb improved. Continues to cough up blood tinged sputum which is likely related to CHF and pulm edema. R/LHC Monday  per Cards. Diurese and monitor strict I&Os. CT chest showed concern for PNA vs edema but no signs of lesion. No leukocytosis, afebrile. Cont treating UTI.    9/25: Episode of chest pain this am. Trops negative. Cont IV diuresis with Lasix. Plan for R/LHC tomorrow. Cards following. Hgb stable and improved to 7.9 despite patient coughing up bloody sputum and as stated above likely from right heart failure and pulm edema.    9/26: Cont IV diuresis and once volume status improved plan for MPI stress test per Cards. Hgb improving. Procal negative but CRP elevated treating for PNA empirically. Cont holding Eliquis and send sputum to micro.     9/27: Cont IV diuresis and optimize for MPI stress test. Will add midodrine to augment BP because patient definitely needs IV diuresis. Cont abx. Hemoptysis slowly improving.       Interval History: . Hemoptysis slowly improving.     Review of Systems   Constitutional:  Positive for fatigue. Negative for chills and fever.   HENT: Negative.  Negative for congestion, rhinorrhea, sore throat and trouble swallowing.    Eyes: Negative.    Respiratory:  Positive for cough (blood) and shortness of breath. Negative for wheezing.    Cardiovascular:  Negative for chest pain, palpitations and leg swelling.   Gastrointestinal: Negative.  Negative for abdominal pain, diarrhea, nausea and vomiting.   Endocrine: Negative.    Genitourinary: Negative.  Negative for dysuria and flank pain.   Musculoskeletal: Negative.  Negative for back pain.   Skin: Negative.  Negative for rash.   Allergic/Immunologic: Negative.    Neurological: Negative.  Negative for speech difficulty, weakness, numbness and headaches.   Hematological: Negative.    Psychiatric/Behavioral: Negative.  Negative for hallucinations. The patient is not nervous/anxious.    All other systems reviewed and are negative.  Objective:     Vital Signs (Most Recent):  Temp: 97.7 °F (36.5 °C) (09/27/22 1133)  Pulse: 109 (09/27/22 1133)  Resp:  16 (09/27/22 1133)  BP: 102/67 (09/27/22 1133)  SpO2: 100 % (09/27/22 1133) Vital Signs (24h Range):  Temp:  [97.3 °F (36.3 °C)-98.2 °F (36.8 °C)] 97.7 °F (36.5 °C)  Pulse:  [100-125] 109  Resp:  [15-18] 16  SpO2:  [96 %-100 %] 100 %  BP: ()/(48-81) 102/67     Weight: 56.7 kg (125 lb)  Body mass index is 21.46 kg/m².    Intake/Output Summary (Last 24 hours) at 9/27/2022 1540  Last data filed at 9/26/2022 1800  Gross per 24 hour   Intake 740 ml   Output --   Net 740 ml      Physical Exam  Vitals and nursing note reviewed.   Constitutional:       General: She is awake. She is not in acute distress.     Appearance: She is ill-appearing.      Interventions: Nasal cannula in place.      Comments: Currently on 2 L O2 N/C   HENT:      Head: Normocephalic and atraumatic.      Mouth/Throat:      Mouth: Mucous membranes are moist.   Eyes:      General: No scleral icterus.     Conjunctiva/sclera: Conjunctivae normal.   Cardiovascular:      Rate and Rhythm: Normal rate and regular rhythm.      Heart sounds: No murmur heard.  Pulmonary:      Effort: Pulmonary effort is normal. No respiratory distress.      Breath sounds: No wheezing, rhonchi or rales.      Comments: Blood tinged sputum  Abdominal:      Palpations: Abdomen is soft.      Tenderness: There is no abdominal tenderness.   Musculoskeletal:         General: No swelling (Trace edema bilateral ankles). Normal range of motion.      Cervical back: Normal range of motion and neck supple.   Skin:     General: Skin is warm.   Neurological:      General: No focal deficit present.      Mental Status: She is alert and oriented to person, place, and time. Mental status is at baseline.   Psychiatric:         Attention and Perception: Attention normal.         Mood and Affect: Affect is flat.         Speech: Speech normal.         Behavior: Behavior is cooperative.       Significant Labs: All pertinent labs within the past 24 hours have been reviewed.    Significant Imaging: I  have reviewed all pertinent imaging results/findings within the past 24 hours.      Assessment/Plan:      * Hemoptysis  -holding Eliquis  -BP stable  -CTA chest negative for PE or masses    9/24:  Cont holding Eliquis  Differentials include CHF-Pulm edema vs GI issue. CTA chest negative for masses  R/LHC on Monday per Cards  Hgb remains stable    9/25:  Hgb stable and improved to 7.9 despite patient coughing up bloody sputum and as stated above likely from right heart failure and pulm edema.    9/26:  Cont IV diuresis and once volume status improved plan for MPI stress test per Cards. Hgb improving. Procal negative but CRP elevated treating for PNA empirically. Cont holding Eliquis and send sputum to micro.     9/27:  Cont IV diuresis and optimize for MPI stress test. Will add midodrine to augment BP because patient definitely needs IV diuresis.    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  arterial lower extremity thrombosis status post embolectomy June 2022  HOLD Eliquis in the setting of active hemoptysis     Urinary tract infection with hematuria  UA c/w UTI  IV Rocephin       SOB (shortness of breath)  Resolved       Anemia  Iron studies c/w anemia  Does not warrant transfusion currently    9/26:  hgb now 8.0    9/27:  Stable      Tobacco use    Assistance with smoking cessation was offered, including:  []  Medications  [x]  Counseling  []  Printed Information on Smoking Cessation  []  Referral to a Smoking Cessation Program    Patient was counseled regarding smoking for 3-10 minutes.        Non compliance w medication regimen         Cocaine abuse  -per Care Everywhere, has been snorting cocaine since age 18  -UDS pending, hold BB until resulted    9/24:  UDS negative  BB resumed     Acute on chronic systolic congestive heart failure  -EF 25% on echo done in June 2022 at [facility]  -repeat echo here  -Lasix 40 mg IV BID x 2 doses, re-evaluate  -CHF pathway    9/24:  Patient is identified as having  Combined Systolic and Diastolic heart failure that is Acute on chronic. CHF is currently uncontrolled due to Rales/crackles on pulmonary exam and Pulmonary edema/pleural effusion on CXR. Latest ECHO performed and demonstrates- Results for orders placed during the hospital encounter of 09/22/22    Echo    Interpretation Summary  · The left ventricle is severely enlarged with concentric hypertrophy and severely decreased systolic function.  · The estimated ejection fraction is 15%.  · Severe left atrial enlargement.  · Severe right atrial enlargement.  · Grade III left ventricular diastolic dysfunction.  · The estimated PA systolic pressure is 106 mmHg.  · Normal right ventricular size with mildly reduced right ventricular systolic function.  · There is pulmonary hypertension.  · Elevated central venous pressure (15 mmHg).  · Moderate to severe tricuspid regurgitation.  · There is severe left ventricular global hypokinesis.  · Moderate pulmonic regurgitation.  · Moderate mitral regurgitation.  · There is a small right pleural effusion.  . Continue Beta Blocker and Furosemide and monitor clinical status closely. Monitor on telemetry. Patient is on CHF pathway.  Monitor strict Is&Os and daily weights.  Place on fluid restriction of 1.5 L. Continue to stress to patient importance of self efficacy and  on diet for CHF. Last BNP reviewed- and noted below   Recent Labs   Lab 09/26/22  0615   BNP 3,048*     Lasix IV  Strict I&Os  Daily weights  1500ml fluid restriction  2gm Na diet  Cards consult   Plan for R/LHC on Monday 9/25:  Episode of chest pain this am  Trops negative  Cont IV diuresis with Lasix.  Plan for R/LHC tomorrow  Cards following  Hgb stable and improved to 7.9 despite patient coughing up bloody sputum and as stated above likely from right heart failure and pulm edema..     9/26:  Cont Iv lasix  Needs improved volume status prior to MPI stress test    9/27:  Cont IV diuresis and optimize for MPI  stress test. Will add midodrine to augment BP because patient definitely needs IV diuresis.       VTE Risk Mitigation (From admission, onward)         Ordered     Place sequential compression device  Until discontinued         09/23/22 0539     IP VTE HIGH RISK PATIENT  Once         09/23/22 0539                Discharge Planning   PEPITO:      Code Status: Full Code   Is the patient medically ready for discharge?:     Reason for patient still in hospital (select all that apply): Patient trending condition  Discharge Plan A: Home   Discharge Delays: None known at this time              Luna Escalante NP  Department of Hospital Medicine   [facility]

## 2022-09-27 NOTE — ASSESSMENT & PLAN NOTE
Iron studies c/w anemia  Does not warrant transfusion currently    9/26:  hgb now 8.0    9/27:  Stable

## 2022-09-27 NOTE — CONSULTS
Chart reviewed by Dr. Alicea.       ASSESSMENT/PLAN:    Hemoptysis    The order for a IVC filter has been placed and the procedure will be performed tomorrow.          Thank you for the consult.

## 2022-09-27 NOTE — SUBJECTIVE & OBJECTIVE
Interval History:     09/27: seen and examined; Feels better occasional old dark blood streaks, T max: 98F, on RA Abx CEFTRIAXONE + DOXY, CXR yesterday reveiwed: may need IVC if prolong anticoagulation interruption      Respiratory ROS: positive for - cough and hemoptysis  negative for - orthopnea, pleuritic pain, shortness of breath, sputum changes, stridor, tachypnea, or wheezing     Objective:     Vital Signs (Most Recent):  Temp: 97.8 °F (36.6 °C) (09/27/22 0840)  Pulse: (!) 113 (09/27/22 0845)  Resp: 16 (09/27/22 0845)  BP: 97/68 (09/27/22 0845)  SpO2: 98 % (09/27/22 0845)   Vital Signs (24h Range):  Temp:  [97 °F (36.1 °C)-98 °F (36.7 °C)] 97.8 °F (36.6 °C)  Pulse:  [100-125] 113  Resp:  [15-20] 16  SpO2:  [96 %-100 %] 98 %  BP: ()/(48-81) 97/68     Weight: 56.7 kg (125 lb)  Body mass index is 21.46 kg/m².      Intake/Output Summary (Last 24 hours) at 9/27/2022 1009  Last data filed at 9/26/2022 1800  Gross per 24 hour   Intake 1120 ml   Output --   Net 1120 ml       Physical Exam  Vitals and nursing note reviewed.   Constitutional:       General: She is not in acute distress.     Appearance: She is well-developed. She is ill-appearing.       HENT:      Head: Normocephalic and atraumatic.      Nose: Nose normal.   Eyes:      Extraocular Movements: Extraocular movements intact.   Cardiovascular:      Rate and Rhythm: Normal rate and regular rhythm.      Pulses: Normal pulses.      Heart sounds: Normal heart sounds.   Pulmonary:      Effort: Pulmonary effort is normal.      Breath sounds: Normal breath sounds. No stridor.   Abdominal:      General: Bowel sounds are normal. There is no distension.      Palpations: Abdomen is soft.   Musculoskeletal:         General: No signs of injury. Normal range of motion.      Cervical back: Normal range of motion and neck supple.   Skin:     General: Skin is warm and dry.      Capillary Refill: Capillary refill takes 2 to 3 seconds.   Neurological:      General: No  focal deficit present.      Mental Status: She is alert and oriented to person, place, and time.   Psychiatric:         Mood and Affect: Mood normal.         Behavior: Behavior normal.       Vents:       Lines/Drains/Airways       Peripheral Intravenous Line  Duration                  Peripheral IV - Single Lumen 09/24/22 2153 22 G Anterior;Distal;Right Forearm 2 days         Peripheral IV - Single Lumen 09/26/22 0652 22 G Left;Posterior Hand 1 day                    Significant Labs:    CBC/Anemia Profile:  Recent Labs   Lab 09/26/22  0827 09/27/22  0551   WBC 5.44 5.53   HGB 8.0* 7.8*   HCT 28.2* 27.1*    221   MCV 81* 79*   RDW 25.6* 25.0*        Chemistries:  Recent Labs   Lab 09/26/22  0827   *   K 4.2      CO2 21*   BUN 16   CREATININE 0.8   CALCIUM 8.5*   ALBUMIN 2.5*   PROT 5.8*   BILITOT 2.0*   ALKPHOS 73   ALT 13   AST 20       Blood Culture:   Recent Labs   Lab 09/25/22  1721 09/25/22  1722   LABBLOO No Growth to date  No Growth to date No Growth to date  No Growth to date     POCT Glucose: No results for input(s): POCTGLUCOSE in the last 48 hours.  Respiratory Culture:   Recent Labs   Lab 09/25/22 2137   GSRESP <10 epithelial cells per low power field.  Rare WBC's  Few Gram positive cocci  Rare Gram negative rods   RESPIRATORYC Normal respiratory rubio     Urine Culture: No results for input(s): LABURIN in the last 48 hours.  All pertinent labs within the past 24 hours have been reviewed.    Significant Imaging:  I have reviewed all pertinent imaging results/findings within the past 24 hours.    X-Ray Chest AP Portable  Narrative: EXAMINATION:  XR CHEST AP PORTABLE    CLINICAL HISTORY:  CHF;    FINDINGS:  Single view of the chest.  Comparison 09/23/2022.    Cardiac silhouette is enlarged but stable.  Patchy infiltrate left lower lobe concerning for airspace disease or aspiration.  Trace left pleural effusion suspected.  No pneumothorax.  Bones appear intact.  Impression: See  above.    Electronically signed by: Clarence Alicea MD  Date:    09/26/2022  Time:    07:25

## 2022-09-27 NOTE — SUBJECTIVE & OBJECTIVE
Interval History: . Hemoptysis slowly improving.     Review of Systems   Constitutional:  Positive for fatigue. Negative for chills and fever.   HENT: Negative.  Negative for congestion, rhinorrhea, sore throat and trouble swallowing.    Eyes: Negative.    Respiratory:  Positive for cough (blood) and shortness of breath. Negative for wheezing.    Cardiovascular:  Negative for chest pain, palpitations and leg swelling.   Gastrointestinal: Negative.  Negative for abdominal pain, diarrhea, nausea and vomiting.   Endocrine: Negative.    Genitourinary: Negative.  Negative for dysuria and flank pain.   Musculoskeletal: Negative.  Negative for back pain.   Skin: Negative.  Negative for rash.   Allergic/Immunologic: Negative.    Neurological: Negative.  Negative for speech difficulty, weakness, numbness and headaches.   Hematological: Negative.    Psychiatric/Behavioral: Negative.  Negative for hallucinations. The patient is not nervous/anxious.    All other systems reviewed and are negative.  Objective:     Vital Signs (Most Recent):  Temp: 97.7 °F (36.5 °C) (09/27/22 1133)  Pulse: 109 (09/27/22 1133)  Resp: 16 (09/27/22 1133)  BP: 102/67 (09/27/22 1133)  SpO2: 100 % (09/27/22 1133) Vital Signs (24h Range):  Temp:  [97.3 °F (36.3 °C)-98.2 °F (36.8 °C)] 97.7 °F (36.5 °C)  Pulse:  [100-125] 109  Resp:  [15-18] 16  SpO2:  [96 %-100 %] 100 %  BP: ()/(48-81) 102/67     Weight: 56.7 kg (125 lb)  Body mass index is 21.46 kg/m².    Intake/Output Summary (Last 24 hours) at 9/27/2022 1540  Last data filed at 9/26/2022 1800  Gross per 24 hour   Intake 740 ml   Output --   Net 740 ml      Physical Exam  Vitals and nursing note reviewed.   Constitutional:       General: She is awake. She is not in acute distress.     Appearance: She is ill-appearing.      Interventions: Nasal cannula in place.      Comments: Currently on 2 L O2 N/C   HENT:      Head: Normocephalic and atraumatic.      Mouth/Throat:      Mouth: Mucous membranes  are moist.   Eyes:      General: No scleral icterus.     Conjunctiva/sclera: Conjunctivae normal.   Cardiovascular:      Rate and Rhythm: Normal rate and regular rhythm.      Heart sounds: No murmur heard.  Pulmonary:      Effort: Pulmonary effort is normal. No respiratory distress.      Breath sounds: No wheezing, rhonchi or rales.      Comments: Blood tinged sputum  Abdominal:      Palpations: Abdomen is soft.      Tenderness: There is no abdominal tenderness.   Musculoskeletal:         General: No swelling (Trace edema bilateral ankles). Normal range of motion.      Cervical back: Normal range of motion and neck supple.   Skin:     General: Skin is warm.   Neurological:      General: No focal deficit present.      Mental Status: She is alert and oriented to person, place, and time. Mental status is at baseline.   Psychiatric:         Attention and Perception: Attention normal.         Mood and Affect: Affect is flat.         Speech: Speech normal.         Behavior: Behavior is cooperative.       Significant Labs: All pertinent labs within the past 24 hours have been reviewed.    Significant Imaging: I have reviewed all pertinent imaging results/findings within the past 24 hours.

## 2022-09-27 NOTE — ASSESSMENT & PLAN NOTE
-EF 25% on echo done in June 2022 at [facility]  -repeat echo here  -Lasix 40 mg IV BID x 2 doses, re-evaluate  -CHF pathway    9/24:  Patient is identified as having Combined Systolic and Diastolic heart failure that is Acute on chronic. CHF is currently uncontrolled due to Rales/crackles on pulmonary exam and Pulmonary edema/pleural effusion on CXR. Latest ECHO performed and demonstrates- Results for orders placed during the hospital encounter of 09/22/22    Echo    Interpretation Summary  · The left ventricle is severely enlarged with concentric hypertrophy and severely decreased systolic function.  · The estimated ejection fraction is 15%.  · Severe left atrial enlargement.  · Severe right atrial enlargement.  · Grade III left ventricular diastolic dysfunction.  · The estimated PA systolic pressure is 106 mmHg.  · Normal right ventricular size with mildly reduced right ventricular systolic function.  · There is pulmonary hypertension.  · Elevated central venous pressure (15 mmHg).  · Moderate to severe tricuspid regurgitation.  · There is severe left ventricular global hypokinesis.  · Moderate pulmonic regurgitation.  · Moderate mitral regurgitation.  · There is a small right pleural effusion.  . Continue Beta Blocker and Furosemide and monitor clinical status closely. Monitor on telemetry. Patient is on CHF pathway.  Monitor strict Is&Os and daily weights.  Place on fluid restriction of 1.5 L. Continue to stress to patient importance of self efficacy and  on diet for CHF. Last BNP reviewed- and noted below   Recent Labs   Lab 09/26/22  0615   BNP 3,048*     Lasix IV  Strict I&Os  Daily weights  1500ml fluid restriction  2gm Na diet  Cards consult   Plan for R/LHC on Monday 9/25:  Episode of chest pain this am  Trops negative  Cont IV diuresis with Lasix.  Plan for R/LHC tomorrow  Cards following  Hgb stable and improved to 7.9 despite patient coughing up bloody sputum and as stated above likely from right  heart failure and pulm edema..     9/26:  Cont Iv lasix  Needs improved volume status prior to MPI stress test    9/27:  Cont IV diuresis and optimize for MPI stress test. Will add midodrine to augment BP because patient definitely needs IV diuresis.

## 2022-09-27 NOTE — ASSESSMENT & PLAN NOTE
Hold Apixaban.  Collect sputum to quantize hemoptysis.  Send sputum microbiology   Check MRSA screen  Will need IVC filter  May add ZYVOX  Repeat cxr in AM decide if bronch needed

## 2022-09-27 NOTE — PLAN OF CARE
[facility]  Discharge Reassessment    Primary Care Provider: Juan Contreras MD    Expected Discharge Date:     Reassessment (most recent)       Discharge Reassessment - 09/27/22 0951          Discharge Reassessment    Assessment Type Discharge Planning Reassessment     Did the patient's condition or plan change since previous assessment? No     Discharge Plan discussed with: Patient     Communicated PEPITO with patient/caregiver Date not available/Unable to determine     Discharge Plan A Home     Discharge Plan B Home     DME Needed Upon Discharge  none     Discharge Barriers Identified None     Why the patient remains in the hospital Requires continued medical care        Post-Acute Status    Discharge Delays None known at this time                     Cm will continue to follow patient to assess for dc needs. Patient to dc home with self care once medically stable.

## 2022-09-28 LAB
ACANTHOCYTES BLD QL SMEAR: PRESENT
ANISOCYTOSIS BLD QL SMEAR: ABNORMAL
BACTERIA SPEC AEROBE CULT: NORMAL
BACTERIA SPEC AEROBE CULT: NORMAL
BASOPHILS # BLD AUTO: 0.04 K/UL (ref 0–0.2)
BASOPHILS NFR BLD: 0.7 % (ref 0–1.9)
BNP SERPL-MCNC: 2516 PG/ML (ref 0–99)
DACRYOCYTES BLD QL SMEAR: ABNORMAL
DIFFERENTIAL METHOD: ABNORMAL
EOSINOPHIL # BLD AUTO: 0.2 K/UL (ref 0–0.5)
EOSINOPHIL NFR BLD: 4 % (ref 0–8)
ERYTHROCYTE [DISTWIDTH] IN BLOOD BY AUTOMATED COUNT: 25.1 % (ref 11.5–14.5)
GRAM STN SPEC: NORMAL
HCT VFR BLD AUTO: 25.1 % (ref 37–48.5)
HGB BLD-MCNC: 7.4 G/DL (ref 12–16)
HYPOCHROMIA BLD QL SMEAR: ABNORMAL
IMM GRANULOCYTES # BLD AUTO: 0.02 K/UL (ref 0–0.04)
IMM GRANULOCYTES NFR BLD AUTO: 0.3 % (ref 0–0.5)
LYMPHOCYTES # BLD AUTO: 2.1 K/UL (ref 1–4.8)
LYMPHOCYTES NFR BLD: 35.4 % (ref 18–48)
MCH RBC QN AUTO: 23 PG (ref 27–31)
MCHC RBC AUTO-ENTMCNC: 29.5 G/DL (ref 32–36)
MCV RBC AUTO: 78 FL (ref 82–98)
MONOCYTES # BLD AUTO: 0.4 K/UL (ref 0.3–1)
MONOCYTES NFR BLD: 6.7 % (ref 4–15)
NEUTROPHILS # BLD AUTO: 3.1 K/UL (ref 1.8–7.7)
NEUTROPHILS NFR BLD: 52.9 % (ref 38–73)
NRBC BLD-RTO: 0 /100 WBC
PLATELET # BLD AUTO: 210 K/UL (ref 150–450)
PLATELET BLD QL SMEAR: ABNORMAL
PMV BLD AUTO: 10.1 FL (ref 9.2–12.9)
POIKILOCYTOSIS BLD QL SMEAR: SLIGHT
RBC # BLD AUTO: 3.22 M/UL (ref 4–5.4)
SCHISTOCYTES BLD QL SMEAR: PRESENT
TARGETS BLD QL SMEAR: ABNORMAL
WBC # BLD AUTO: 5.79 K/UL (ref 3.9–12.7)

## 2022-09-28 PROCEDURE — 25000003 PHARM REV CODE 250: Performed by: NURSE PRACTITIONER

## 2022-09-28 PROCEDURE — 63600175 PHARM REV CODE 636 W HCPCS: Performed by: RADIOLOGY

## 2022-09-28 PROCEDURE — 63600175 PHARM REV CODE 636 W HCPCS: Performed by: STUDENT IN AN ORGANIZED HEALTH CARE EDUCATION/TRAINING PROGRAM

## 2022-09-28 PROCEDURE — C1769 GUIDE WIRE: HCPCS | Performed by: RADIOLOGY

## 2022-09-28 PROCEDURE — 25000003 PHARM REV CODE 250: Performed by: INTERNAL MEDICINE

## 2022-09-28 PROCEDURE — 25000003 PHARM REV CODE 250: Performed by: RADIOLOGY

## 2022-09-28 PROCEDURE — 99232 PR SUBSEQUENT HOSPITAL CARE,LEVL II: ICD-10-PCS | Mod: ,,, | Performed by: INTERNAL MEDICINE

## 2022-09-28 PROCEDURE — 99153 MOD SED SAME PHYS/QHP EA: CPT | Performed by: RADIOLOGY

## 2022-09-28 PROCEDURE — 11000001 HC ACUTE MED/SURG PRIVATE ROOM

## 2022-09-28 PROCEDURE — 83880 ASSAY OF NATRIURETIC PEPTIDE: CPT | Performed by: NURSE PRACTITIONER

## 2022-09-28 PROCEDURE — 25500020 PHARM REV CODE 255: Performed by: RADIOLOGY

## 2022-09-28 PROCEDURE — 21400001 HC TELEMETRY ROOM

## 2022-09-28 PROCEDURE — 99232 SBSQ HOSP IP/OBS MODERATE 35: CPT | Mod: ,,, | Performed by: INTERNAL MEDICINE

## 2022-09-28 PROCEDURE — 99152 MOD SED SAME PHYS/QHP 5/>YRS: CPT | Performed by: RADIOLOGY

## 2022-09-28 PROCEDURE — 63600175 PHARM REV CODE 636 W HCPCS: Performed by: NURSE PRACTITIONER

## 2022-09-28 PROCEDURE — 85025 COMPLETE CBC W/AUTO DIFF WBC: CPT | Performed by: NURSE PRACTITIONER

## 2022-09-28 PROCEDURE — 99232 SBSQ HOSP IP/OBS MODERATE 35: CPT | Mod: ,,, | Performed by: PHYSICIAN ASSISTANT

## 2022-09-28 PROCEDURE — C1880 VENA CAVA FILTER: HCPCS | Performed by: RADIOLOGY

## 2022-09-28 PROCEDURE — 36415 COLL VENOUS BLD VENIPUNCTURE: CPT | Performed by: NURSE PRACTITIONER

## 2022-09-28 PROCEDURE — 99232 PR SUBSEQUENT HOSPITAL CARE,LEVL II: ICD-10-PCS | Mod: ,,, | Performed by: PHYSICIAN ASSISTANT

## 2022-09-28 DEVICE — OPTION ELITE RETRIEVABLE VENA CAVA FILTER SUITABLE FOR JUGULAR OR FEMORAL DELIVERY
Type: IMPLANTABLE DEVICE | Site: ABDOMEN | Status: FUNCTIONAL
Brand: OPTION ELITE RETRIEVABLE VENA CAVA FILTER SYSTEM

## 2022-09-28 RX ORDER — FENTANYL CITRATE 50 UG/ML
INJECTION, SOLUTION INTRAMUSCULAR; INTRAVENOUS
Status: DISCONTINUED | OUTPATIENT
Start: 2022-09-28 | End: 2022-09-28 | Stop reason: HOSPADM

## 2022-09-28 RX ORDER — SODIUM CHLORIDE 9 MG/ML
INJECTION, SOLUTION INTRAVENOUS
Status: DISCONTINUED | OUTPATIENT
Start: 2022-09-28 | End: 2022-09-30 | Stop reason: HOSPADM

## 2022-09-28 RX ORDER — MIDAZOLAM HYDROCHLORIDE 1 MG/ML
INJECTION, SOLUTION INTRAMUSCULAR; INTRAVENOUS
Status: DISCONTINUED | OUTPATIENT
Start: 2022-09-28 | End: 2022-09-28 | Stop reason: HOSPADM

## 2022-09-28 RX ORDER — DIPHENHYDRAMINE HYDROCHLORIDE 50 MG/ML
25 INJECTION INTRAMUSCULAR; INTRAVENOUS ONCE
Status: COMPLETED | OUTPATIENT
Start: 2022-09-28 | End: 2022-09-28

## 2022-09-28 RX ADMIN — FUROSEMIDE 40 MG: 10 INJECTION, SOLUTION INTRAVENOUS at 08:09

## 2022-09-28 RX ADMIN — DOXYCYCLINE HYCLATE 100 MG: 100 TABLET, COATED ORAL at 12:09

## 2022-09-28 RX ADMIN — CEFTRIAXONE 1 G: 1 INJECTION, SOLUTION INTRAVENOUS at 05:09

## 2022-09-28 RX ADMIN — MIDODRINE HYDROCHLORIDE 5 MG: 5 TABLET ORAL at 07:09

## 2022-09-28 RX ADMIN — QUETIAPINE FUMARATE 100 MG: 100 TABLET ORAL at 08:09

## 2022-09-28 RX ADMIN — DOXYCYCLINE HYCLATE 100 MG: 100 TABLET, COATED ORAL at 08:09

## 2022-09-28 RX ADMIN — MIDODRINE HYDROCHLORIDE 5 MG: 5 TABLET ORAL at 12:09

## 2022-09-28 RX ADMIN — ATORVASTATIN CALCIUM 40 MG: 40 TABLET, FILM COATED ORAL at 12:09

## 2022-09-28 RX ADMIN — LINEZOLID 600 MG: 600 TABLET, FILM COATED ORAL at 08:09

## 2022-09-28 RX ADMIN — LINEZOLID 600 MG: 600 TABLET, FILM COATED ORAL at 12:09

## 2022-09-28 RX ADMIN — DIPHENHYDRAMINE HYDROCHLORIDE 25 MG: 50 INJECTION INTRAMUSCULAR; INTRAVENOUS at 03:09

## 2022-09-28 NOTE — INTERVAL H&P NOTE
The patient has been examined and the H&P has been reviewed:    I concur with the findings and no changes have occurred since H&P was written.        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

## 2022-09-28 NOTE — ASSESSMENT & PLAN NOTE
Hold Apixaban.  Collect sputum to quantize hemoptysis.  Send sputum microbiology   Check MRSA screen  Will need IVC filter  Added ZYVOX  Repeat cxr clear  Followin clinic reimage  4 weeks

## 2022-09-28 NOTE — PLAN OF CARE
Pt awakened after procedure and remains AAOx3 at time of transfer.   R femoral dressing remains CDI at time of transfer.   Transferred pt to room 541 via hospital bed   in no apparent distress.   Report given to SAMUEL Montano . No further questions at this time.

## 2022-09-28 NOTE — ASSESSMENT & PLAN NOTE
-Likely in setting of fluid overload  -Resume AC as tolerated (patient has Eliquis on home med list, but says it's prescribed as only once a day)    9/25/22  Pulmonology consulted, recommend holding OAC given hemoptysis    9/28/22  -Pulmonary following

## 2022-09-28 NOTE — PROGRESS NOTES
"[facility]  Cardiology  Progress Note    Patient Name: Teresa Echols  MRN: [id number]  Admission Date: 9/22/2022  Hospital Length of Stay: 3 days  Code Status: Full Code   Attending Physician: Simeon Maldonado MD   Primary Care Physician: Juan Contreras MD  Expected Discharge Date:   Principal Problem:Hemoptysis    Subjective:     Hospital Course:   Ms. Echols is a 37 year old female patient whose current medical conditions include chronic systolic CHF (25% per echo 6/22 in Care Everywhere), history of cocaine abuse, RLE DVT s/p thrombectomy in 6/22, CHALINO, and non-compliance who presented to [facility] ED yesterday with a chief complaint of worsening SOB over the past few days. Associated symptoms included hemoptysis, BLE edema, abdominal bloating, and orthopnea. She denied any associated fever, chills, palpitations, near syncope, or syncope. Initial workup in ED revealed anemia (H/H 7.3/25.8), D-dimer +, and BNP of 3610. CTA negative for PE but did show findings consistent with vascular congestion and right heart strain and patient was subsequently admitted for further evaluation and treatment. Cardiology consulted to assist with management. Patient seen and examined today, resting in bed. Remains SOB. Denies osvaldo chest pain but does admit to some left upper chest "tingling". States she has been having issues with SOB/fluid overload ever since her thrombectomy/hospital admission in June. She claims she has been compliant with her medications, but has been using Lasix prn and taking Eliquis only once daily. Admits to eating 2 bags of potato chips prior to admission. Chart reviewed. Initial troponin negative. Repeat echo pending.      9/24/22- doing well.  Abdomen still.  Reports good urine output.  Lasix has been held.  Patient hypotensive.  Blood thinner stopped due to hemoptysis.  Repeat echo with EF 15%, mod-severe TR, RVSP 106 mmhg    9/25/22- continues to have abdominal distension.  Continues to have " hemoptysis, stable Hgb.  Was not started on OAC during admission.  Lactate 2.2, upper limit of normal.  C/o left sided pain. K borderline. BB stopped due to hypotension. Does not feel it she urinating a lot.    9/26/22-Patient seen and examined today, sitting up in bed. Feels ok. SOB slowly improving. Still with abdominal bloating as well as hemoptysis. Pulmonary on board. Labs reviewed. H/H stable. BNP remains elevated.     9/27/22-Patient seen and examined today, resting in bed. SOB and hemoptysis slowly improving. CXR yesterday concerning for infiltrate, remains on abx. Pulmonary following. Labs reviewed. May need IVC filter.    9/28/22-Patient seen and examined today, sitting up in bed. Still with hemoptysis, although reports improvement. Diuresed well overnight with addition of midodrine. IVC filter planned today. Labs relatively stable.          Review of Systems   Constitutional: Positive for malaise/fatigue.   HENT: Negative.     Eyes: Negative.    Cardiovascular:  Positive for dyspnea on exertion.   Respiratory:  Positive for shortness of breath.    Endocrine: Negative.    Hematologic/Lymphatic: Negative.    Skin: Negative.    Musculoskeletal: Negative.    Gastrointestinal: Negative.    Genitourinary: Negative.    Neurological: Negative.    Psychiatric/Behavioral: Negative.     Allergic/Immunologic: Negative.    Objective:     Vital Signs (Most Recent):  Temp: 97.9 °F (36.6 °C) (09/28/22 1233)  Pulse: 104 (09/28/22 1300)  Resp: 18 (09/28/22 1233)  BP: 98/71 (09/28/22 1300)  SpO2: 100 % (09/28/22 1233) Vital Signs (24h Range):  Temp:  [97.1 °F (36.2 °C)-98.9 °F (37.2 °C)] 97.9 °F (36.6 °C)  Pulse:  [100-120] 104  Resp:  [16-18] 18  SpO2:  [94 %-100 %] 100 %  BP: ()/(54-75) 98/71     Weight: 50.9 kg (112 lb 3.4 oz)  Body mass index is 19.26 kg/m².     SpO2: 100 %  O2 Device (Oxygen Therapy): room air      Intake/Output Summary (Last 24 hours) at 9/28/2022 1302  Last data filed at 9/28/2022 1000  Gross  per 24 hour   Intake 134.27 ml   Output 1500 ml   Net -1365.73 ml       Lines/Drains/Airways       Peripheral Intravenous Line  Duration                  Peripheral IV - Single Lumen 09/26/22 0652 22 G Left;Posterior Hand 2 days                    Physical Exam  Vitals and nursing note reviewed.   Constitutional:       General: She is not in acute distress.     Appearance: Normal appearance. She is well-developed. She is not diaphoretic.   HENT:      Head: Normocephalic and atraumatic.   Eyes:      General:         Right eye: No discharge.         Left eye: No discharge.      Pupils: Pupils are equal, round, and reactive to light.   Neck:      Thyroid: No thyromegaly.      Vascular: No JVD.      Trachea: No tracheal deviation.   Cardiovascular:      Rate and Rhythm: Regular rhythm. Tachycardia present.      Heart sounds: Normal heart sounds, S1 normal and S2 normal. No murmur heard.  Pulmonary:      Effort: Pulmonary effort is normal. No respiratory distress.      Breath sounds: Normal breath sounds. No wheezing or rales.   Abdominal:      General: There is no distension.      Tenderness: There is no rebound.   Musculoskeletal:      Cervical back: Neck supple.      Right lower leg: No edema.      Left lower leg: No edema.   Skin:     General: Skin is warm and dry.      Findings: No erythema.   Neurological:      General: No focal deficit present.      Mental Status: She is alert and oriented to person, place, and time.   Psychiatric:         Mood and Affect: Mood normal.         Behavior: Behavior normal.         Thought Content: Thought content normal.       Significant Labs: CMP No results for input(s): NA, K, CL, CO2, GLU, BUN, CREATININE, CALCIUM, PROT, ALBUMIN, BILITOT, ALKPHOS, AST, ALT, ANIONGAP, ESTGFRAFRICA, EGFRNONAA in the last 48 hours., CBC   Recent Labs   Lab 09/27/22  0551 09/28/22  0517   WBC 5.53 5.79   HGB 7.8* 7.4*   HCT 27.1* 25.1*    210   , Troponin No results for input(s): TROPONINI in  the last 48 hours., and All pertinent lab results from the last 24 hours have been reviewed.    Significant Imaging: EKG: Reviewed    Assessment and Plan:   Patient with CHF who presents with volume overload and hemoptysis. Pulmonary following. Continue IV diuresis. IVC filter today.    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  -AC on hold given hemoptysis  -IVC filter planned today    Anemia  -Hgb range 7-8  -Transfuse prn, as per hospital medicine      Tobacco use  -Denies recent usage    Non compliance w medication regimen  -Counseled on compliance    Cocaine abuse  -UDS negative  -Denies recent use    Acute on chronic systolic congestive heart failure  -Patient with known CHF, EF 25% by last echo in Care Everywhere  -Grossly overloaded on exam  -Continue IV diuresis  -Resume BB if UDS negative  -Will attempt to add ARB vs Entresto pending BP trend  -Repeat Echo pending  -Patient with history of medication non-compliance  -Counseled on dietary and fluid restrictions    9/24/22  ED is negative for cocaine, metoprolol started  Repeat echo with EF 15%, moderate to severe TR, RVSP 106 mmhg-> consistent with volume overload   Continue IV diuresis   Monitor urine output  Blood pressure labile  Will need ischemia evaluation with R/LHC during this admission     9/25/22  Beta-blocker held due to hypotension  Lactate 2.2, higher limit of normal   Will try metolazone 2.5 mg and assess output   Will consider starting dobutamine if still decrease urine output  R/LHC possibly tomorrow (tentative), pending patient's volume status  Keep NPO at midnight    9/26/22  -BNP still elevated  -Continue IV diuresis   -Limited with OMT given borderline  BP  -Ischemic evaluation with MPI stress test once hemoptysis/volume status improved    9/27/22  -Continue IV diuresis  -Will optimize regimen as tolerated  -Ischemic evaluation once stable        VTE Risk Mitigation (From admission, onward)         Ordered     Place sequential compression device  Until discontinued         09/23/22 0539     IP VTE HIGH RISK PATIENT  Once         09/23/22 0539                Zoe Kim PA-C  Cardiology  [facility]

## 2022-09-28 NOTE — ASSESSMENT & PLAN NOTE
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 Scheduled to be placed today per IR. Hgb remains stable. CXR this am showed no significant change--per pulm possible Bronch?

## 2022-09-28 NOTE — ASSESSMENT & PLAN NOTE
-holding Eliquis  -BP stable  -CTA chest negative for PE or masses    9/24:  Cont holding Eliquis  Differentials include CHF-Pulm edema vs GI issue. CTA chest negative for masses  R/LHC on Monday per Cards  Hgb remains stable    9/25:  Hgb stable and improved to 7.9 despite patient coughing up bloody sputum and as stated above likely from right heart failure and pulm edema.    9/26:  Cont IV diuresis and once volume status improved plan for MPI stress test per Cards. Hgb improving. Procal negative but CRP elevated treating for PNA empirically. Cont holding Eliquis and send sputum to micro.     9/27:  Cont IV diuresis and optimize for MPI stress test. Will add midodrine to augment BP because patient definitely needs IV diuresis.    9/28:  Improving   Will need an IVC filter because unable to tolerate PO AC. Scheduled to be placed today per IR  Hgb remains stable  CXR this am showed no significant change--per pulm possible Bronch?   Cards following.

## 2022-09-28 NOTE — SUBJECTIVE & OBJECTIVE
Interval History: Hemoptysis improving. IVC filter placement today    Review of Systems   Constitutional:  Negative for chills, fatigue and fever.   HENT: Negative.  Negative for congestion, rhinorrhea, sore throat and trouble swallowing.    Eyes: Negative.    Respiratory:  Positive for cough (blood) and shortness of breath. Negative for wheezing.    Cardiovascular:  Negative for chest pain, palpitations and leg swelling.   Gastrointestinal: Negative.  Negative for abdominal pain, diarrhea, nausea and vomiting.   Endocrine: Negative.    Genitourinary: Negative.  Negative for dysuria and flank pain.   Musculoskeletal: Negative.  Negative for back pain.   Skin: Negative.  Negative for rash.   Allergic/Immunologic: Negative.    Neurological: Negative.  Negative for speech difficulty, weakness, numbness and headaches.   Hematological: Negative.    Psychiatric/Behavioral: Negative.  Negative for hallucinations. The patient is not nervous/anxious.    All other systems reviewed and are negative.  Objective:     Vital Signs (Most Recent):  Temp: 98.1 °F (36.7 °C) (09/28/22 0738)  Pulse: 102 (09/28/22 0738)  Resp: 18 (09/28/22 0427)  BP: 92/60 (09/28/22 0738)  SpO2: 98 % (09/28/22 0738) Vital Signs (24h Range):  Temp:  [97.1 °F (36.2 °C)-98.9 °F (37.2 °C)] 98.1 °F (36.7 °C)  Pulse:  [102-120] 102  Resp:  [16-18] 18  SpO2:  [94 %-100 %] 98 %  BP: ()/(54-75) 92/60     Weight: 50.9 kg (112 lb 3.4 oz)  Body mass index is 19.26 kg/m².    Intake/Output Summary (Last 24 hours) at 9/28/2022 1109  Last data filed at 9/28/2022 0559  Gross per 24 hour   Intake 134.27 ml   Output --   Net 134.27 ml      Physical Exam  Vitals and nursing note reviewed.   Constitutional:       General: She is awake. She is not in acute distress.     Appearance: She is ill-appearing.      Interventions: Nasal cannula in place.   HENT:      Head: Normocephalic and atraumatic.      Mouth/Throat:      Mouth: Mucous membranes are moist.   Eyes:       General: No scleral icterus.     Conjunctiva/sclera: Conjunctivae normal.   Cardiovascular:      Rate and Rhythm: Normal rate and regular rhythm.      Heart sounds: No murmur heard.  Pulmonary:      Effort: Pulmonary effort is normal. No respiratory distress.      Breath sounds: No wheezing, rhonchi or rales.      Comments: Blood tinged sputum  Abdominal:      Palpations: Abdomen is soft.      Tenderness: There is no abdominal tenderness.   Musculoskeletal:         General: No swelling (Trace edema bilateral ankles). Normal range of motion.      Cervical back: Normal range of motion and neck supple.   Skin:     General: Skin is warm.   Neurological:      General: No focal deficit present.      Mental Status: She is alert and oriented to person, place, and time. Mental status is at baseline.   Psychiatric:         Attention and Perception: Attention normal.         Mood and Affect: Affect is flat.         Speech: Speech normal.         Behavior: Behavior is cooperative.       Significant Labs: All pertinent labs within the past 24 hours have been reviewed.    Significant Imaging: I have reviewed all pertinent imaging results/findings within the past 24 hours.

## 2022-09-28 NOTE — ASSESSMENT & PLAN NOTE
-EF 25% on echo done in June 2022 at [facility]  -repeat echo here  -Lasix 40 mg IV BID x 2 doses, re-evaluate  -CHF pathway    9/24:  Patient is identified as having Combined Systolic and Diastolic heart failure that is Acute on chronic. CHF is currently uncontrolled due to Rales/crackles on pulmonary exam and Pulmonary edema/pleural effusion on CXR. Latest ECHO performed and demonstrates- Results for orders placed during the hospital encounter of 09/22/22    Echo    Interpretation Summary  · The left ventricle is severely enlarged with concentric hypertrophy and severely decreased systolic function.  · The estimated ejection fraction is 15%.  · Severe left atrial enlargement.  · Severe right atrial enlargement.  · Grade III left ventricular diastolic dysfunction.  · The estimated PA systolic pressure is 106 mmHg.  · Normal right ventricular size with mildly reduced right ventricular systolic function.  · There is pulmonary hypertension.  · Elevated central venous pressure (15 mmHg).  · Moderate to severe tricuspid regurgitation.  · There is severe left ventricular global hypokinesis.  · Moderate pulmonic regurgitation.  · Moderate mitral regurgitation.  · There is a small right pleural effusion.  . Continue Beta Blocker and Furosemide and monitor clinical status closely. Monitor on telemetry. Patient is on CHF pathway.  Monitor strict Is&Os and daily weights.  Place on fluid restriction of 1.5 L. Continue to stress to patient importance of self efficacy and  on diet for CHF. Last BNP reviewed- and noted below   Recent Labs   Lab 09/26/22  0615   BNP 3,048*     Lasix IV  Strict I&Os  Daily weights  1500ml fluid restriction  2gm Na diet  Cards consult   Plan for R/LHC on Monday 9/25:  Episode of chest pain this am  Trops negative  Cont IV diuresis with Lasix.  Plan for R/LHC tomorrow  Cards following  Hgb stable and improved to 7.9 despite patient coughing up bloody sputum and as stated above likely from right  heart failure and pulm edema..     9/26:  Cont Iv lasix  Needs improved volume status prior to MPI stress test    9/27:  Cont IV diuresis and optimize for MPI stress test. Will add midodrine to augment BP because patient definitely needs IV diuresis.     9/28:  Midodrine helping with BP  Cont diuresis

## 2022-09-28 NOTE — SUBJECTIVE & OBJECTIVE
Interval History:  09/28: seen and examined; Feels better occasional old dark blood streaks, T max: 98. 9F, on RA Abx CEFTRIAXONE + DOXY+ ZYVOX, CXR yesterday reveiwed: await IVC filter, CXR improved.      Respiratory ROS: positive for - cough and sputum changes  negative for - orthopnea, pleuritic pain, sputum changes, stridor, tachypnea, or wheezing   Hemoptysis improved      Objective:     Vital Signs (Most Recent):  Temp: 98.1 °F (36.7 °C) (09/28/22 0738)  Pulse: 102 (09/28/22 0738)  Resp: 18 (09/28/22 0427)  BP: 92/60 (09/28/22 0738)  SpO2: 98 % (09/28/22 0738) Vital Signs (24h Range):  Temp:  [97.1 °F (36.2 °C)-98.9 °F (37.2 °C)] 98.1 °F (36.7 °C)  Pulse:  [102-120] 102  Resp:  [16-18] 18  SpO2:  [94 %-98 %] 98 %  BP: ()/(54-75) 92/60     Weight: 50.9 kg (112 lb 3.4 oz)  Body mass index is 19.26 kg/m².      Intake/Output Summary (Last 24 hours) at 9/28/2022 1231  Last data filed at 9/28/2022 1000  Gross per 24 hour   Intake 134.27 ml   Output 1500 ml   Net -1365.73 ml       Physical Exam  Vitals and nursing note reviewed.   Constitutional:       General: She is not in acute distress.     Appearance: She is well-developed. She is ill-appearing.       HENT:      Head: Normocephalic and atraumatic.      Nose: Nose normal.   Eyes:      Extraocular Movements: Extraocular movements intact.   Cardiovascular:      Rate and Rhythm: Normal rate and regular rhythm.      Pulses: Normal pulses.      Heart sounds: Normal heart sounds.   Pulmonary:      Effort: Pulmonary effort is normal.      Breath sounds: Normal breath sounds. No stridor.   Abdominal:      General: Bowel sounds are normal. There is no distension.      Palpations: Abdomen is soft.   Musculoskeletal:         General: No signs of injury. Normal range of motion.      Cervical back: Normal range of motion and neck supple.   Skin:     General: Skin is warm and dry.      Capillary Refill: Capillary refill takes 2 to 3 seconds.   Neurological:      General:  No focal deficit present.      Mental Status: She is alert and oriented to person, place, and time.   Psychiatric:         Mood and Affect: Mood normal.         Behavior: Behavior normal.       Vents:       Lines/Drains/Airways       Peripheral Intravenous Line  Duration                  Peripheral IV - Single Lumen 09/26/22 0652 22 G Left;Posterior Hand 2 days                    Significant Labs:    CBC/Anemia Profile:  Recent Labs   Lab 09/27/22  0551 09/28/22  0517   WBC 5.53 5.79   HGB 7.8* 7.4*   HCT 27.1* 25.1*    210   MCV 79* 78*   RDW 25.0* 25.1*        Chemistries:  No results for input(s): NA, K, CL, CO2, BUN, CREATININE, CALCIUM, ALBUMIN, PROT, BILITOT, ALKPHOS, ALT, AST, GLUCOSE, MG, PHOS in the last 48 hours.    Blood Culture: No results for input(s): LABBLOO in the last 48 hours.  Respiratory Culture: No results for input(s): GSRESP, RESPIRATORYC in the last 48 hours.  All pertinent labs within the past 24 hours have been reviewed.    Significant Imaging:  I have reviewed all pertinent imaging results/findings within the past 24 hours.      X-Ray Chest 1 View  Narrative: EXAMINATION:  XR CHEST 1 VIEW    CLINICAL HISTORY:  follow up;    COMPARISON:  September 26, 2022    FINDINGS:  EKG leads overlie the chest which is lordotic in position and rotated to the right.  Stable left retrocardiac infiltrate.  The lungs are free of new pulmonary opacities.  The hilar and mediastinal contours and osseous structures are unchanged.  Impression: 1.  Overall, no significant interval change has occurred.    Electronically signed by: Alexander Murrieta MD  Date:    09/27/2022  Time:    18:35

## 2022-09-28 NOTE — PROGRESS NOTES
[facility]  Pulmonology  Progress Note    Patient Name: Teresa Echols  MRN: [id number]  Admission Date: 9/22/2022  Hospital Length of Stay: 3 days  Code Status: Full Code  Attending Provider: Simeon Maldonado MD  Primary Care Provider: Juan Contreras MD   Principal Problem: Hemoptysis    Subjective:     Interval History:  09/28: seen and examined; Feels better occasional old dark blood streaks, T max: 98. 9F, on RA Abx CEFTRIAXONE + DOXY+ ZYVOX, CXR yesterday reveiwed: await IVC filter, CXR improved.      Respiratory ROS: positive for - cough and sputum changes  negative for - orthopnea, pleuritic pain, sputum changes, stridor, tachypnea, or wheezing   Hemoptysis improved      Objective:     Vital Signs (Most Recent):  Temp: 98.1 °F (36.7 °C) (09/28/22 0738)  Pulse: 102 (09/28/22 0738)  Resp: 18 (09/28/22 0427)  BP: 92/60 (09/28/22 0738)  SpO2: 98 % (09/28/22 0738) Vital Signs (24h Range):  Temp:  [97.1 °F (36.2 °C)-98.9 °F (37.2 °C)] 98.1 °F (36.7 °C)  Pulse:  [102-120] 102  Resp:  [16-18] 18  SpO2:  [94 %-98 %] 98 %  BP: ()/(54-75) 92/60     Weight: 50.9 kg (112 lb 3.4 oz)  Body mass index is 19.26 kg/m².      Intake/Output Summary (Last 24 hours) at 9/28/2022 1231  Last data filed at 9/28/2022 1000  Gross per 24 hour   Intake 134.27 ml   Output 1500 ml   Net -1365.73 ml       Physical Exam  Vitals and nursing note reviewed.   Constitutional:       General: She is not in acute distress.     Appearance: She is well-developed. She is ill-appearing.       HENT:      Head: Normocephalic and atraumatic.      Nose: Nose normal.   Eyes:      Extraocular Movements: Extraocular movements intact.   Cardiovascular:      Rate and Rhythm: Normal rate and regular rhythm.      Pulses: Normal pulses.      Heart sounds: Normal heart sounds.   Pulmonary:      Effort: Pulmonary effort is normal.      Breath sounds: Normal breath sounds. No stridor.   Abdominal:      General: Bowel sounds are normal. There is no  distension.      Palpations: Abdomen is soft.   Musculoskeletal:         General: No signs of injury. Normal range of motion.      Cervical back: Normal range of motion and neck supple.   Skin:     General: Skin is warm and dry.      Capillary Refill: Capillary refill takes 2 to 3 seconds.   Neurological:      General: No focal deficit present.      Mental Status: She is alert and oriented to person, place, and time.   Psychiatric:         Mood and Affect: Mood normal.         Behavior: Behavior normal.       Vents:       Lines/Drains/Airways       Peripheral Intravenous Line  Duration                  Peripheral IV - Single Lumen 09/26/22 0652 22 G Left;Posterior Hand 2 days                    Significant Labs:    CBC/Anemia Profile:  Recent Labs   Lab 09/27/22  0551 09/28/22  0517   WBC 5.53 5.79   HGB 7.8* 7.4*   HCT 27.1* 25.1*    210   MCV 79* 78*   RDW 25.0* 25.1*        Chemistries:  No results for input(s): NA, K, CL, CO2, BUN, CREATININE, CALCIUM, ALBUMIN, PROT, BILITOT, ALKPHOS, ALT, AST, GLUCOSE, MG, PHOS in the last 48 hours.    Blood Culture: No results for input(s): LABBLOO in the last 48 hours.  Respiratory Culture: No results for input(s): GSRESP, RESPIRATORYC in the last 48 hours.  All pertinent labs within the past 24 hours have been reviewed.    Significant Imaging:  I have reviewed all pertinent imaging results/findings within the past 24 hours.      X-Ray Chest 1 View  Narrative: EXAMINATION:  XR CHEST 1 VIEW    CLINICAL HISTORY:  follow up;    COMPARISON:  September 26, 2022    FINDINGS:  EKG leads overlie the chest which is lordotic in position and rotated to the right.  Stable left retrocardiac infiltrate.  The lungs are free of new pulmonary opacities.  The hilar and mediastinal contours and osseous structures are unchanged.  Impression: 1.  Overall, no significant interval change has occurred.    Electronically signed by: Alexander Murrieta MD  Date:    09/27/2022  Time:    18:35        ABG  No results for input(s): PH, PO2, PCO2, HCO3, BE in the last 168 hours.  Assessment/Plan:     * Hemoptysis  Hold Apixaban.  Collect sputum to quantize hemoptysis.  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  Treat CHF.   IR for IVC filter    Cocaine abuse  Cocaine abuse complicated by systolic heart failure and arterial lower extremity thrombosis status post embolectomy June 2022.    Acute on chronic systolic congestive heart failure  Patient is identified as having Combined Systolic and Diastolic heart failure that is Acute on chronic. CHF is currently uncontrolled due to Rales/crackles on pulmonary exam and Pulmonary edema/pleural effusion on CXR. Latest ECHO performed and demonstrates- Results for orders placed during the hospital encounter of 09/22/22    Echo    Interpretation Summary  · The left ventricle is severely enlarged with concentric hypertrophy and severely decreased systolic function.  · The estimated ejection fraction is 15%.  · Severe left atrial enlargement.  · Severe right atrial enlargement.  · Grade III left ventricular diastolic dysfunction.  · The estimated PA systolic pressure is 106 mmHg.  · Normal right ventricular size with mildly reduced right ventricular systolic function.  · There is pulmonary hypertension.  · Elevated central venous pressure (15 mmHg).  · Moderate to severe tricuspid regurgitation.  · There is severe left ventricular global hypokinesis.  · Moderate pulmonic regurgitation.  · Moderate mitral regurgitation.  · There is a small right pleural effusion.  . Continue Furosemide and monitor clinical status closely. Monitor on telemetry. Patient is on CHF pathway.  Monitor strict Is&Os and daily weights.  Place on fluid restriction of 1.5 L. Continue to stress to patient importance of  self efficacy and  on diet for CHF. Last BNP reviewed- and noted below   Recent Labs   Lab 09/26/22  0615   BNP 3,048*   .  Strict I&Os.    IV Lasix.       I have reviewed all labs and imaging studies and compared to previous results. I have also discussed labs with all the teams in the medical care of the patient and my plan is outlined below       Jesus Bowen MD  Pulmonology  [facility]

## 2022-09-28 NOTE — PROGRESS NOTES
[facility] Medicine  Progress Note    Patient Name: Teresa Echols  MRN: [id number]  Patient Class: IP- Inpatient   Admission Date: 9/22/2022  Length of Stay: 3 days  Attending Physician: Simeon Maldonado MD  Primary Care Provider: Juan Contreras MD        Subjective:     Principal Problem:Hemoptysis        HPI:  Ms. Echols is a 37-year-old  female with PMH significant for systolic CHF (EF 25% on echo done June 2022), chronic cocaine user, right lower extremity DVT status post thrombectomy on in June), presented to [facility] ED complaining of coughing up blood the past 2-3 months, since the thrombectomy procedure.  Patient on Xarelto.  States that she has been to multiple hospitals for the same issue, was told that there is nothing abnormal and was sent home.  Denies chest pain, but complains of SOB, worsen with exertion.  Denies fever, chills.  /68.  Afebrile.  HR .  SaO2 100% on 2 L O2 N/C (not home oxygen dependent).  Laboratory workup reveals hemoglobin 7.3, MCV 81, INR 1.4.  D-dimer elevated 2.65.  CTA chest negative for PE, but reveals enlarged cardiac silhouette, right heart strain, increased vascular congestion.  BNP elevated 3610.  Troponin 0.024.  UDS pending.  Received Lasix 40 mg IV x1 in the ED.    Admitting diagnosis:  Hemoptysis.  Acute on chronic systolic CHF.  Chronic cocaine user.  History of right leg DVT, status post thrombectomy in June 2022.  On Xarelto.      Overview/Hospital Course:  9/23: 37-year-old female admitted for hemoptysis, upon further investigation patient has blood-tinged sputum not vomitus.  Iron studies pending.  Since stopping Eliquis this hospitalization she has not had any blood-tinged sputum production.  BB resumed and will further diurese.  Monitor H/ H    9/24: Iron studies c/w with anemia, although Hgb improved. Continues to cough up blood tinged sputum which is likely related to CHF and pulm edema. R/LHC Monday  per Cards. Diurese and monitor strict I&Os. CT chest showed concern for PNA vs edema but no signs of lesion. No leukocytosis, afebrile. Cont treating UTI.    9/25: Episode of chest pain this am. Trops negative. Cont IV diuresis with Lasix. Plan for R/LHC tomorrow. Cards following. Hgb stable and improved to 7.9 despite patient coughing up bloody sputum and as stated above likely from right heart failure and pulm edema.    9/26: Cont IV diuresis and once volume status improved plan for MPI stress test per Cards. Hgb improving. Procal negative but CRP elevated treating for PNA empirically. Cont holding Eliquis and send sputum to micro.     9/27: Cont IV diuresis and optimize for MPI stress test. Will add midodrine to augment BP because patient definitely needs IV diuresis. Cont abx. Hemoptysis slowly improving.     9/28: Will need an IVC filter because unable to tolerate PO AC. Scheduled to be placed today per IR. Hgb remains stable. CXR this am showed no significant change--per pulm possible Bronch? Cards following.       Interval History: Hemoptysis improving. IVC filter placement today    Review of Systems   Constitutional:  Negative for chills, fatigue and fever.   HENT: Negative.  Negative for congestion, rhinorrhea, sore throat and trouble swallowing.    Eyes: Negative.    Respiratory:  Positive for cough (blood) and shortness of breath. Negative for wheezing.    Cardiovascular:  Negative for chest pain, palpitations and leg swelling.   Gastrointestinal: Negative.  Negative for abdominal pain, diarrhea, nausea and vomiting.   Endocrine: Negative.    Genitourinary: Negative.  Negative for dysuria and flank pain.   Musculoskeletal: Negative.  Negative for back pain.   Skin: Negative.  Negative for rash.   Allergic/Immunologic: Negative.    Neurological: Negative.  Negative for speech difficulty, weakness, numbness and headaches.   Hematological: Negative.    Psychiatric/Behavioral: Negative.  Negative for  hallucinations. The patient is not nervous/anxious.    All other systems reviewed and are negative.  Objective:     Vital Signs (Most Recent):  Temp: 98.1 °F (36.7 °C) (09/28/22 0738)  Pulse: 102 (09/28/22 0738)  Resp: 18 (09/28/22 0427)  BP: 92/60 (09/28/22 0738)  SpO2: 98 % (09/28/22 0738) Vital Signs (24h Range):  Temp:  [97.1 °F (36.2 °C)-98.9 °F (37.2 °C)] 98.1 °F (36.7 °C)  Pulse:  [102-120] 102  Resp:  [16-18] 18  SpO2:  [94 %-100 %] 98 %  BP: ()/(54-75) 92/60     Weight: 50.9 kg (112 lb 3.4 oz)  Body mass index is 19.26 kg/m².    Intake/Output Summary (Last 24 hours) at 9/28/2022 1109  Last data filed at 9/28/2022 0559  Gross per 24 hour   Intake 134.27 ml   Output --   Net 134.27 ml      Physical Exam  Vitals and nursing note reviewed.   Constitutional:       General: She is awake. She is not in acute distress.     Appearance: She is ill-appearing.      Interventions: Nasal cannula in place.   HENT:      Head: Normocephalic and atraumatic.      Mouth/Throat:      Mouth: Mucous membranes are moist.   Eyes:      General: No scleral icterus.     Conjunctiva/sclera: Conjunctivae normal.   Cardiovascular:      Rate and Rhythm: Normal rate and regular rhythm.      Heart sounds: No murmur heard.  Pulmonary:      Effort: Pulmonary effort is normal. No respiratory distress.      Breath sounds: No wheezing, rhonchi or rales.      Comments: Blood tinged sputum  Abdominal:      Palpations: Abdomen is soft.      Tenderness: There is no abdominal tenderness.   Musculoskeletal:         General: No swelling (Trace edema bilateral ankles). Normal range of motion.      Cervical back: Normal range of motion and neck supple.   Skin:     General: Skin is warm.   Neurological:      General: No focal deficit present.      Mental Status: She is alert and oriented to person, place, and time. Mental status is at baseline.   Psychiatric:         Attention and Perception: Attention normal.         Mood and Affect: Affect is  flat.         Speech: Speech normal.         Behavior: Behavior is cooperative.       Significant Labs: All pertinent labs within the past 24 hours have been reviewed.    Significant Imaging: I have reviewed all pertinent imaging results/findings within the past 24 hours.      Assessment/Plan:      * Hemoptysis  -holding Eliquis  -BP stable  -CTA chest negative for PE or masses    9/24:  Cont holding Eliquis  Differentials include CHF-Pulm edema vs GI issue. CTA chest negative for masses  R/LHC on Monday per Cards  Hgb remains stable    9/25:  Hgb stable and improved to 7.9 despite patient coughing up bloody sputum and as stated above likely from right heart failure and pulm edema.    9/26:  Cont IV diuresis and once volume status improved plan for MPI stress test per Cards. Hgb improving. Procal negative but CRP elevated treating for PNA empirically. Cont holding Eliquis and send sputum to micro.     9/27:  Cont IV diuresis and optimize for MPI stress test. Will add midodrine to augment BP because patient definitely needs IV diuresis.    9/28:  Improving   Will need an IVC filter because unable to tolerate PO AC. Scheduled to be placed today per IR  Hgb remains stable  CXR this am showed no significant change--per pulm possible Bronch?   Cards following.     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 Scheduled to be placed today per IR. Hgb remains stable. CXR this am showed no significant change--per pulm possible Bronch?     Urinary tract infection with hematuria  UA c/w UTI  IV Rocephin       SOB (shortness of breath)  Resolved       Anemia  Iron studies c/w anemia  Does not warrant transfusion currently    9/26:  hgb now 8.0    9/27:  Stable      Tobacco use    Assistance with smoking cessation was offered,  including:  []  Medications  [x]  Counseling  []  Printed Information on Smoking Cessation  []  Referral to a Smoking Cessation Program    Patient was counseled regarding smoking for 3-10 minutes.        Non compliance w medication regimen         Cocaine abuse  -per Care Everywhere, has been snorting cocaine since age 18  -UDS pending, hold BB until resulted    9/24:  UDS negative  BB resumed     Acute on chronic systolic congestive heart failure  -EF 25% on echo done in June 2022 at [facility]  -repeat echo here  -Lasix 40 mg IV BID x 2 doses, re-evaluate  -CHF pathway    9/24:  Patient is identified as having Combined Systolic and Diastolic heart failure that is Acute on chronic. CHF is currently uncontrolled due to Rales/crackles on pulmonary exam and Pulmonary edema/pleural effusion on CXR. Latest ECHO performed and demonstrates- Results for orders placed during the hospital encounter of 09/22/22    Echo    Interpretation Summary  · The left ventricle is severely enlarged with concentric hypertrophy and severely decreased systolic function.  · The estimated ejection fraction is 15%.  · Severe left atrial enlargement.  · Severe right atrial enlargement.  · Grade III left ventricular diastolic dysfunction.  · The estimated PA systolic pressure is 106 mmHg.  · Normal right ventricular size with mildly reduced right ventricular systolic function.  · There is pulmonary hypertension.  · Elevated central venous pressure (15 mmHg).  · Moderate to severe tricuspid regurgitation.  · There is severe left ventricular global hypokinesis.  · Moderate pulmonic regurgitation.  · Moderate mitral regurgitation.  · There is a small right pleural effusion.  . Continue Beta Blocker and Furosemide and monitor clinical status closely. Monitor on telemetry. Patient is on CHF pathway.  Monitor strict Is&Os and daily weights.  Place on fluid restriction of 1.5 L. Continue to stress to patient importance of self efficacy and  on diet  for CHF. Last BNP reviewed- and noted below   Recent Labs   Lab 09/26/22  0615   BNP 3,048*     Lasix IV  Strict I&Os  Daily weights  1500ml fluid restriction  2gm Na diet  Cards consult   Plan for R/LHC on Monday 9/25:  Episode of chest pain this am  Trops negative  Cont IV diuresis with Lasix.  Plan for R/LHC tomorrow  Cards following  Hgb stable and improved to 7.9 despite patient coughing up bloody sputum and as stated above likely from right heart failure and pulm edema..     9/26:  Cont Iv lasix  Needs improved volume status prior to MPI stress test    9/27:  Cont IV diuresis and optimize for MPI stress test. Will add midodrine to augment BP because patient definitely needs IV diuresis.     9/28:  Midodrine helping with BP  Cont diuresis       VTE Risk Mitigation (From admission, onward)         Ordered     Place sequential compression device  Until discontinued         09/23/22 0539     IP VTE HIGH RISK PATIENT  Once         09/23/22 0539                Discharge Planning   PEPITO:      Code Status: Full Code   Is the patient medically ready for discharge?:     Reason for patient still in hospital (select all that apply): Patient trending condition  Discharge Plan A: Home   Discharge Delays: None known at this time              Luna Escalante NP  Department of Hospital Medicine   [facility]

## 2022-09-28 NOTE — PLAN OF CARE
Problem: Adult Inpatient Plan of Care  Goal: Plan of Care Review  Outcome: Ongoing, Progressing     Problem: Fall Injury Risk  Goal: Absence of Fall and Fall-Related Injury  Outcome: Ongoing, Progressing     Problem: Infection  Goal: Absence of Infection Signs and Symptoms  Outcome: Ongoing, Progressing     Patient remains free from injury. Safety precautions maintained. No s/s of acute distress.  Pt education about care completed.

## 2022-09-28 NOTE — PROCEDURES
Radiology Post-Procedure Note    Pre Op Diagnosis: Hx deep venous thrombosis and contraind to anticoagulation    Post Op Diagnosis: : same    Procedure: IVC filter placement    Procedure performed by: Dr Clarence Alicea    Written Informed Consent Obtained: Yes    Specimen Removed: NO    Estimated Blood Loss: Minimal    Findings: After written informed consent and sterile prep and drape, the right femoral vein was cannulated and a pigtail catheter was placed into the IVC.  Contrast injection under fluoroscopy revealed anatomy suitable for placement of IVC filter immediately inferior to the renal veins.  The filter was placed at this level under fluoroscopic surveillance and post placement venogram demonstrated adequate position and function.  Please see full procedural report in Imaging for further details.       Patient tolerated procedure well.    Clarence Alicea MD  Staff Radiologist  Department of Radiology  Pager: [phone]

## 2022-09-28 NOTE — SUBJECTIVE & OBJECTIVE
Review of Systems   Constitutional: Positive for malaise/fatigue.   HENT: Negative.     Eyes: Negative.    Cardiovascular:  Positive for dyspnea on exertion.   Respiratory:  Positive for shortness of breath.    Endocrine: Negative.    Hematologic/Lymphatic: Negative.    Skin: Negative.    Musculoskeletal: Negative.    Gastrointestinal: Negative.    Genitourinary: Negative.    Neurological: Negative.    Psychiatric/Behavioral: Negative.     Allergic/Immunologic: Negative.    Objective:     Vital Signs (Most Recent):  Temp: 97.9 °F (36.6 °C) (09/28/22 1233)  Pulse: 104 (09/28/22 1300)  Resp: 18 (09/28/22 1233)  BP: 98/71 (09/28/22 1300)  SpO2: 100 % (09/28/22 1233) Vital Signs (24h Range):  Temp:  [97.1 °F (36.2 °C)-98.9 °F (37.2 °C)] 97.9 °F (36.6 °C)  Pulse:  [100-120] 104  Resp:  [16-18] 18  SpO2:  [94 %-100 %] 100 %  BP: ()/(54-75) 98/71     Weight: 50.9 kg (112 lb 3.4 oz)  Body mass index is 19.26 kg/m².     SpO2: 100 %  O2 Device (Oxygen Therapy): room air      Intake/Output Summary (Last 24 hours) at 9/28/2022 1302  Last data filed at 9/28/2022 1000  Gross per 24 hour   Intake 134.27 ml   Output 1500 ml   Net -1365.73 ml       Lines/Drains/Airways       Peripheral Intravenous Line  Duration                  Peripheral IV - Single Lumen 09/26/22 0652 22 G Left;Posterior Hand 2 days                    Physical Exam  Vitals and nursing note reviewed.   Constitutional:       General: She is not in acute distress.     Appearance: Normal appearance. She is well-developed. She is not diaphoretic.   HENT:      Head: Normocephalic and atraumatic.   Eyes:      General:         Right eye: No discharge.         Left eye: No discharge.      Pupils: Pupils are equal, round, and reactive to light.   Neck:      Thyroid: No thyromegaly.      Vascular: No JVD.      Trachea: No tracheal deviation.   Cardiovascular:      Rate and Rhythm: Regular rhythm. Tachycardia present.      Heart sounds: Normal heart sounds, S1  normal and S2 normal. No murmur heard.  Pulmonary:      Effort: Pulmonary effort is normal. No respiratory distress.      Breath sounds: Normal breath sounds. No wheezing or rales.   Abdominal:      General: There is no distension.      Tenderness: There is no rebound.   Musculoskeletal:      Cervical back: Neck supple.      Right lower leg: No edema.      Left lower leg: No edema.   Skin:     General: Skin is warm and dry.      Findings: No erythema.   Neurological:      General: No focal deficit present.      Mental Status: She is alert and oriented to person, place, and time.   Psychiatric:         Mood and Affect: Mood normal.         Behavior: Behavior normal.         Thought Content: Thought content normal.       Significant Labs: CMP No results for input(s): NA, K, CL, CO2, GLU, BUN, CREATININE, CALCIUM, PROT, ALBUMIN, BILITOT, ALKPHOS, AST, ALT, ANIONGAP, ESTGFRAFRICA, EGFRNONAA in the last 48 hours., CBC   Recent Labs   Lab 09/27/22  0551 09/28/22  0517   WBC 5.53 5.79   HGB 7.8* 7.4*   HCT 27.1* 25.1*    210   , Troponin No results for input(s): TROPONINI in the last 48 hours., and All pertinent lab results from the last 24 hours have been reviewed.    Significant Imaging: EKG: Reviewed

## 2022-09-29 LAB
ACANTHOCYTES BLD QL SMEAR: PRESENT
ANISOCYTOSIS BLD QL SMEAR: SLIGHT
BASOPHILS # BLD AUTO: 0.05 K/UL (ref 0–0.2)
BASOPHILS NFR BLD: 1 % (ref 0–1.9)
BURR CELLS BLD QL SMEAR: ABNORMAL
DACRYOCYTES BLD QL SMEAR: ABNORMAL
DIFFERENTIAL METHOD: ABNORMAL
EOSINOPHIL # BLD AUTO: 0.2 K/UL (ref 0–0.5)
EOSINOPHIL NFR BLD: 3.9 % (ref 0–8)
ERYTHROCYTE [DISTWIDTH] IN BLOOD BY AUTOMATED COUNT: 24.9 % (ref 11.5–14.5)
GIANT PLATELETS BLD QL SMEAR: PRESENT
HCT VFR BLD AUTO: 27 % (ref 37–48.5)
HGB BLD-MCNC: 7.6 G/DL (ref 12–16)
HYPOCHROMIA BLD QL SMEAR: ABNORMAL
IMM GRANULOCYTES # BLD AUTO: 0.02 K/UL (ref 0–0.04)
IMM GRANULOCYTES NFR BLD AUTO: 0.4 % (ref 0–0.5)
LYMPHOCYTES # BLD AUTO: 1.5 K/UL (ref 1–4.8)
LYMPHOCYTES NFR BLD: 30.1 % (ref 18–48)
MCH RBC QN AUTO: 23.2 PG (ref 27–31)
MCHC RBC AUTO-ENTMCNC: 28.1 G/DL (ref 32–36)
MCV RBC AUTO: 82 FL (ref 82–98)
MONOCYTES # BLD AUTO: 0.4 K/UL (ref 0.3–1)
MONOCYTES NFR BLD: 7.4 % (ref 4–15)
NEUTROPHILS # BLD AUTO: 2.8 K/UL (ref 1.8–7.7)
NEUTROPHILS NFR BLD: 57.2 % (ref 38–73)
NRBC BLD-RTO: 0 /100 WBC
OVALOCYTES BLD QL SMEAR: ABNORMAL
PLATELET # BLD AUTO: 209 K/UL (ref 150–450)
PLATELET BLD QL SMEAR: ABNORMAL
PMV BLD AUTO: 10.4 FL (ref 9.2–12.9)
POIKILOCYTOSIS BLD QL SMEAR: ABNORMAL
POLYCHROMASIA BLD QL SMEAR: ABNORMAL
RBC # BLD AUTO: 3.28 M/UL (ref 4–5.4)
SCHISTOCYTES BLD QL SMEAR: PRESENT
TARGETS BLD QL SMEAR: ABNORMAL
WBC # BLD AUTO: 4.85 K/UL (ref 3.9–12.7)

## 2022-09-29 PROCEDURE — 21400001 HC TELEMETRY ROOM

## 2022-09-29 PROCEDURE — 11000001 HC ACUTE MED/SURG PRIVATE ROOM

## 2022-09-29 PROCEDURE — 99232 PR SUBSEQUENT HOSPITAL CARE,LEVL II: ICD-10-PCS | Mod: ,,, | Performed by: INTERNAL MEDICINE

## 2022-09-29 PROCEDURE — 63600175 PHARM REV CODE 636 W HCPCS: Performed by: INTERNAL MEDICINE

## 2022-09-29 PROCEDURE — 63600175 PHARM REV CODE 636 W HCPCS: Performed by: NURSE PRACTITIONER

## 2022-09-29 PROCEDURE — 99232 SBSQ HOSP IP/OBS MODERATE 35: CPT | Mod: ,,, | Performed by: INTERNAL MEDICINE

## 2022-09-29 PROCEDURE — 25000003 PHARM REV CODE 250: Performed by: NURSE PRACTITIONER

## 2022-09-29 PROCEDURE — 36415 COLL VENOUS BLD VENIPUNCTURE: CPT | Performed by: NURSE PRACTITIONER

## 2022-09-29 PROCEDURE — 85025 COMPLETE CBC W/AUTO DIFF WBC: CPT | Performed by: NURSE PRACTITIONER

## 2022-09-29 PROCEDURE — 25000003 PHARM REV CODE 250: Performed by: INTERNAL MEDICINE

## 2022-09-29 RX ORDER — LEVOFLOXACIN 750 MG/1
750 TABLET ORAL DAILY
Qty: 5 TABLET | Refills: 0 | Status: SHIPPED | OUTPATIENT
Start: 2022-09-29 | End: 2022-10-04

## 2022-09-29 RX ORDER — QUETIAPINE FUMARATE 100 MG/1
100 TABLET, FILM COATED ORAL NIGHTLY
Qty: 30 TABLET | Refills: 2 | Status: SHIPPED | OUTPATIENT
Start: 2022-09-29 | End: 2022-12-28

## 2022-09-29 RX ADMIN — DOXYCYCLINE HYCLATE 100 MG: 100 TABLET, COATED ORAL at 08:09

## 2022-09-29 RX ADMIN — HYDROCODONE BITARTRATE AND ACETAMINOPHEN 1 TABLET: 5; 325 TABLET ORAL at 08:09

## 2022-09-29 RX ADMIN — LINEZOLID 600 MG: 600 TABLET, FILM COATED ORAL at 08:09

## 2022-09-29 RX ADMIN — APIXABAN 2.5 MG: 2.5 TABLET, FILM COATED ORAL at 08:09

## 2022-09-29 RX ADMIN — CEFTRIAXONE 1 G: 1 INJECTION, SOLUTION INTRAVENOUS at 06:09

## 2022-09-29 RX ADMIN — ONDANSETRON 4 MG: 2 INJECTION INTRAMUSCULAR; INTRAVENOUS at 01:09

## 2022-09-29 RX ADMIN — ATORVASTATIN CALCIUM 40 MG: 40 TABLET, FILM COATED ORAL at 08:09

## 2022-09-29 RX ADMIN — MIDODRINE HYDROCHLORIDE 5 MG: 5 TABLET ORAL at 08:09

## 2022-09-29 RX ADMIN — QUETIAPINE FUMARATE 100 MG: 100 TABLET ORAL at 08:09

## 2022-09-29 RX ADMIN — APIXABAN 2.5 MG: 2.5 TABLET, FILM COATED ORAL at 01:09

## 2022-09-29 NOTE — SUBJECTIVE & OBJECTIVE
Interval History: Initiated on low dose Eliquis per Pulm recs. IVC filter in place. Needs Lasix low dose per Cards with close follow up. Discharge on PO Levaquinx5 days.  Pulm and cards referrals sent. If stable d/c in the am     Review of Systems   Constitutional:  Negative for chills, fatigue and fever.   HENT: Negative.  Negative for congestion, rhinorrhea, sore throat and trouble swallowing.    Eyes: Negative.    Respiratory:  Positive for cough (blood) and shortness of breath. Negative for wheezing.    Cardiovascular:  Negative for chest pain, palpitations and leg swelling.   Gastrointestinal: Negative.  Negative for abdominal pain, diarrhea, nausea and vomiting.   Endocrine: Negative.    Genitourinary: Negative.  Negative for dysuria and flank pain.   Musculoskeletal: Negative.  Negative for back pain.   Skin: Negative.  Negative for rash.   Allergic/Immunologic: Negative.    Neurological: Negative.  Negative for speech difficulty, weakness, numbness and headaches.   Hematological: Negative.    Psychiatric/Behavioral: Negative.  Negative for hallucinations. The patient is not nervous/anxious.    All other systems reviewed and are negative.  Objective:     Vital Signs (Most Recent):  Temp: 98 °F (36.7 °C) (09/29/22 1149)  Pulse: 100 (09/29/22 1542)  Resp: 18 (09/29/22 1149)  BP: 96/66 (09/29/22 1149)  SpO2: 99 % (09/29/22 1149) Vital Signs (24h Range):  Temp:  [97.4 °F (36.3 °C)-98.9 °F (37.2 °C)] 98 °F (36.7 °C)  Pulse:  [] 100  Resp:  [14-18] 18  SpO2:  [98 %-100 %] 99 %  BP: ()/(54-71) 96/66     Weight: 55.4 kg (122 lb 2.2 oz)  Body mass index is 20.96 kg/m².    Intake/Output Summary (Last 24 hours) at 9/29/2022 1632  Last data filed at 9/28/2022 1800  Gross per 24 hour   Intake 200 ml   Output --   Net 200 ml      Physical Exam  Vitals and nursing note reviewed.   Constitutional:       General: She is awake. She is not in acute distress.     Appearance: She is ill-appearing.       Interventions: Nasal cannula in place.   HENT:      Head: Normocephalic and atraumatic.      Mouth/Throat:      Mouth: Mucous membranes are moist.   Eyes:      General: No scleral icterus.     Conjunctiva/sclera: Conjunctivae normal.   Cardiovascular:      Rate and Rhythm: Normal rate and regular rhythm.      Heart sounds: No murmur heard.  Pulmonary:      Effort: Pulmonary effort is normal. No respiratory distress.      Breath sounds: No wheezing, rhonchi or rales.      Comments: Blood tinged sputum  Abdominal:      Palpations: Abdomen is soft.      Tenderness: There is no abdominal tenderness.   Musculoskeletal:         General: No swelling (Trace edema bilateral ankles). Normal range of motion.      Cervical back: Normal range of motion and neck supple.   Skin:     General: Skin is warm.   Neurological:      General: No focal deficit present.      Mental Status: She is alert and oriented to person, place, and time. Mental status is at baseline.   Psychiatric:         Attention and Perception: Attention normal.         Mood and Affect: Affect is flat.         Speech: Speech normal.         Behavior: Behavior is cooperative.       Significant Labs: All pertinent labs within the past 24 hours have been reviewed.    Significant Imaging: I have reviewed all pertinent imaging results/findings within the past 24 hours.

## 2022-09-29 NOTE — ASSESSMENT & PLAN NOTE
-holding Eliquis  -BP stable  -CTA chest negative for PE or masses    9/24:  Cont holding Eliquis  Differentials include CHF-Pulm edema vs GI issue. CTA chest negative for masses  R/LHC on Monday per Cards  Hgb remains stable    9/25:  Hgb stable and improved to 7.9 despite patient coughing up bloody sputum and as stated above likely from right heart failure and pulm edema.    9/26:  Cont IV diuresis and once volume status improved plan for MPI stress test per Cards. Hgb improving. Procal negative but CRP elevated treating for PNA empirically. Cont holding Eliquis and send sputum to micro.     9/27:  Cont IV diuresis and optimize for MPI stress test. Will add midodrine to augment BP because patient definitely needs IV diuresis.    9/28:  Improving   Will need an IVC filter because unable to tolerate PO AC. Scheduled to be placed today per IR  Hgb remains stable  CXR this am showed no significant change--per pulm possible Bronch?   Cards following.     9/29:  Initiated on low dose Eliquis per Pulm recs. IVC filter in place. Needs Lasix low dose per Cards with close follow up. Discharge on PO Levaquinx5 days.  Pulm and cards referrals sent. If stable d/c in the am

## 2022-09-29 NOTE — ASSESSMENT & PLAN NOTE
Hold Apixaban.    Check MRSA screen  Will need IVC filter  Complete 10 days ZYVOX  Repeat cxr clear  Follow in clinic reimage  4 weeks

## 2022-09-29 NOTE — PROGRESS NOTES
[facility]  Pulmonology  Progress Note    Patient Name: Teresa Echols  MRN: [id number]  Admission Date: 9/22/2022  Hospital Length of Stay: 4 days  Code Status: Full Code  Attending Provider: Simeon Maldonado MD  Primary Care Provider: Juan Contreras MD   Principal Problem: Hemoptysis    Subjective:     Interval History:   09/29 seen and examined; Feels better   T max: 98. 9F, on RA Abx CEFTRIAXONE + DOXY+ ZYVOX, CXR   reveiwed SP  IVC filter, CXR improved.    Respiratory ROS: no cough, shortness of breath, or wheezing     Objective:     Vital Signs (Most Recent):  Temp: 97.4 °F (36.3 °C) (09/29/22 0732)  Pulse: 98 (09/29/22 0930)  Resp: 16 (09/29/22 0806)  BP: 101/70 (09/29/22 0732)  SpO2: 100 % (09/29/22 0732) Vital Signs (24h Range):  Temp:  [97.4 °F (36.3 °C)-98.9 °F (37.2 °C)] 97.4 °F (36.3 °C)  Pulse:  [] 98  Resp:  [14-20] 16  SpO2:  [98 %-100 %] 100 %  BP: ()/(54-78) 101/70     Weight: 55.4 kg (122 lb 2.2 oz)  Body mass index is 20.96 kg/m².      Intake/Output Summary (Last 24 hours) at 9/29/2022 1102  Last data filed at 9/28/2022 1800  Gross per 24 hour   Intake 200 ml   Output --   Net 200 ml       Physical Exam  Vitals and nursing note reviewed.   Constitutional:       General: She is not in acute distress.     Appearance: She is well-developed. She is ill-appearing.       HENT:      Head: Normocephalic and atraumatic.      Nose: Nose normal.   Eyes:      Extraocular Movements: Extraocular movements intact.   Cardiovascular:      Rate and Rhythm: Normal rate and regular rhythm.      Pulses: Normal pulses.      Heart sounds: Normal heart sounds.   Pulmonary:      Effort: Pulmonary effort is normal.      Breath sounds: Normal breath sounds. No stridor.   Abdominal:      General: Bowel sounds are normal. There is no distension.      Palpations: Abdomen is soft.   Musculoskeletal:         General: No signs of injury. Normal range of motion.      Cervical back: Normal range of motion and  neck supple.   Skin:     General: Skin is warm and dry.      Capillary Refill: Capillary refill takes 2 to 3 seconds.   Neurological:      General: No focal deficit present.      Mental Status: She is alert and oriented to person, place, and time.   Psychiatric:         Mood and Affect: Mood normal.         Behavior: Behavior normal.       Vents:       Lines/Drains/Airways       Peripheral Intravenous Line  Duration                  Peripheral IV - Single Lumen 09/26/22 0652 22 G Left;Posterior Hand 3 days                    Significant Labs:    CBC/Anemia Profile:  Recent Labs   Lab 09/28/22  0517 09/29/22  0458   WBC 5.79 4.85   HGB 7.4* 7.6*   HCT 25.1* 27.0*    209   MCV 78* 82   RDW 25.1* 24.9*        Chemistries:  No results for input(s): NA, K, CL, CO2, BUN, CREATININE, CALCIUM, ALBUMIN, PROT, BILITOT, ALKPHOS, ALT, AST, GLUCOSE, MG, PHOS in the last 48 hours.    Blood Culture: No results for input(s): LABBLOO in the last 48 hours.  POCT Glucose: No results for input(s): POCTGLUCOSE in the last 48 hours.  Respiratory Culture: No results for input(s): GSRESP, RESPIRATORYC in the last 48 hours.  All pertinent labs within the past 24 hours have been reviewed.    Significant Imaging:  I have reviewed all pertinent imaging results/findings within the past 24 hours.  I have reviewed and interpreted all pertinent imaging results/findings within the past 24 hours.      ABG  No results for input(s): PH, PO2, PCO2, HCO3, BE in the last 168 hours.  Assessment/Plan:     * Hemoptysis  Hold Apixaban.    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  Treat CHF.   IR SP IVC filter    Would be willing to reconsider re challenge with lower dose Elliquis: 2.5 mg    Cocaine abuse  Cocaine abuse complicated by systolic heart failure and arterial lower  extremity thrombosis status post embolectomy June 2022.    Acute on chronic systolic congestive heart failure  Patient is identified as having Combined Systolic and Diastolic heart failure that is Acute on chronic. CHF is currently uncontrolled due to Rales/crackles on pulmonary exam and Pulmonary edema/pleural effusion on CXR. Latest ECHO performed and demonstrates- Results for orders placed during the hospital encounter of 09/22/22    Echo    Interpretation Summary  · The left ventricle is severely enlarged with concentric hypertrophy and severely decreased systolic function.  · The estimated ejection fraction is 15%.  · Severe left atrial enlargement.  · Severe right atrial enlargement.  · Grade III left ventricular diastolic dysfunction.  · The estimated PA systolic pressure is 106 mmHg.  · Normal right ventricular size with mildly reduced right ventricular systolic function.  · There is pulmonary hypertension.  · Elevated central venous pressure (15 mmHg).  · Moderate to severe tricuspid regurgitation.  · There is severe left ventricular global hypokinesis.  · Moderate pulmonic regurgitation.  · Moderate mitral regurgitation.  · There is a small right pleural effusion.  . Continue Furosemide and monitor clinical status closely. Monitor on telemetry. Patient is on CHF pathway.  Monitor strict Is&Os and daily weights.  Place on fluid restriction of 1.5 L. Continue to stress to patient importance of self efficacy and  on diet for CHF. Last BNP reviewed- and noted below   Recent Labs   Lab 09/28/22  0957   BNP 2,516*   .  Strict I&Os.    IV Lasix.         I have reviewed all labs and imaging studies and compared to previous results. I have also discussed labs with all the teams in the medical care of the patient and my plan is outlined below       Jesus Bowen MD  Pulmonology  [facility]

## 2022-09-29 NOTE — ASSESSMENT & PLAN NOTE
Patient is identified as having Combined Systolic and Diastolic heart failure that is Acute on chronic. CHF is currently uncontrolled due to Rales/crackles on pulmonary exam and Pulmonary edema/pleural effusion on CXR. Latest ECHO performed and demonstrates- Results for orders placed during the hospital encounter of 09/22/22    Echo    Interpretation Summary  · The left ventricle is severely enlarged with concentric hypertrophy and severely decreased systolic function.  · The estimated ejection fraction is 15%.  · Severe left atrial enlargement.  · Severe right atrial enlargement.  · Grade III left ventricular diastolic dysfunction.  · The estimated PA systolic pressure is 106 mmHg.  · Normal right ventricular size with mildly reduced right ventricular systolic function.  · There is pulmonary hypertension.  · Elevated central venous pressure (15 mmHg).  · Moderate to severe tricuspid regurgitation.  · There is severe left ventricular global hypokinesis.  · Moderate pulmonic regurgitation.  · Moderate mitral regurgitation.  · There is a small right pleural effusion.  . Continue Furosemide and monitor clinical status closely. Monitor on telemetry. Patient is on CHF pathway.  Monitor strict Is&Os and daily weights.  Place on fluid restriction of 1.5 L. Continue to stress to patient importance of self efficacy and  on diet for CHF. Last BNP reviewed- and noted below   Recent Labs   Lab 09/28/22  0957   BNP 2,516*   .  Strict I&Os.    IV Lasix.

## 2022-09-29 NOTE — PLAN OF CARE
Patient remains free of injury. AAOx4. POC reviewed, patient verbalizes understanding. Denies pain or discomfort at this time. Pt continues to have hemoptysis. Low sodium diet tolerated with fluid restriction. Call light and personal items within reach. Chart check complete. Will continue to monitor.    Problem: Adult Inpatient Plan of Care  Goal: Plan of Care Review  Outcome: Ongoing, Progressing  Goal: Patient-Specific Goal (Individualized)  Outcome: Ongoing, Progressing  Goal: Absence of Hospital-Acquired Illness or Injury  Outcome: Ongoing, Progressing  Goal: Optimal Comfort and Wellbeing  Outcome: Ongoing, Progressing  Goal: Readiness for Transition of Care  Outcome: Ongoing, Progressing

## 2022-09-29 NOTE — SUBJECTIVE & OBJECTIVE
Interval History:   09/29 seen and examined; Feels better   T max: 98. 9F, on RA Abx CEFTRIAXONE + DOXY+ ZYVOX, CXR   reveiwed SP  IVC filter, CXR improved.    Respiratory ROS: no cough, shortness of breath, or wheezing     Objective:     Vital Signs (Most Recent):  Temp: 97.4 °F (36.3 °C) (09/29/22 0732)  Pulse: 98 (09/29/22 0930)  Resp: 16 (09/29/22 0806)  BP: 101/70 (09/29/22 0732)  SpO2: 100 % (09/29/22 0732) Vital Signs (24h Range):  Temp:  [97.4 °F (36.3 °C)-98.9 °F (37.2 °C)] 97.4 °F (36.3 °C)  Pulse:  [] 98  Resp:  [14-20] 16  SpO2:  [98 %-100 %] 100 %  BP: ()/(54-78) 101/70     Weight: 55.4 kg (122 lb 2.2 oz)  Body mass index is 20.96 kg/m².      Intake/Output Summary (Last 24 hours) at 9/29/2022 1102  Last data filed at 9/28/2022 1800  Gross per 24 hour   Intake 200 ml   Output --   Net 200 ml       Physical Exam  Vitals and nursing note reviewed.   Constitutional:       General: She is not in acute distress.     Appearance: She is well-developed. She is ill-appearing.       HENT:      Head: Normocephalic and atraumatic.      Nose: Nose normal.   Eyes:      Extraocular Movements: Extraocular movements intact.   Cardiovascular:      Rate and Rhythm: Normal rate and regular rhythm.      Pulses: Normal pulses.      Heart sounds: Normal heart sounds.   Pulmonary:      Effort: Pulmonary effort is normal.      Breath sounds: Normal breath sounds. No stridor.   Abdominal:      General: Bowel sounds are normal. There is no distension.      Palpations: Abdomen is soft.   Musculoskeletal:         General: No signs of injury. Normal range of motion.      Cervical back: Normal range of motion and neck supple.   Skin:     General: Skin is warm and dry.      Capillary Refill: Capillary refill takes 2 to 3 seconds.   Neurological:      General: No focal deficit present.      Mental Status: She is alert and oriented to person, place, and time.   Psychiatric:         Mood and Affect: Mood normal.          Behavior: Behavior normal.       Vents:       Lines/Drains/Airways       Peripheral Intravenous Line  Duration                  Peripheral IV - Single Lumen 09/26/22 0652 22 G Left;Posterior Hand 3 days                    Significant Labs:    CBC/Anemia Profile:  Recent Labs   Lab 09/28/22  0517 09/29/22  0458   WBC 5.79 4.85   HGB 7.4* 7.6*   HCT 25.1* 27.0*    209   MCV 78* 82   RDW 25.1* 24.9*        Chemistries:  No results for input(s): NA, K, CL, CO2, BUN, CREATININE, CALCIUM, ALBUMIN, PROT, BILITOT, ALKPHOS, ALT, AST, GLUCOSE, MG, PHOS in the last 48 hours.    Blood Culture: No results for input(s): LABBLOO in the last 48 hours.  POCT Glucose: No results for input(s): POCTGLUCOSE in the last 48 hours.  Respiratory Culture: No results for input(s): GSRESP, RESPIRATORYC in the last 48 hours.  All pertinent labs within the past 24 hours have been reviewed.    Significant Imaging:  I have reviewed all pertinent imaging results/findings within the past 24 hours.  I have reviewed and interpreted all pertinent imaging results/findings within the past 24 hours.

## 2022-09-29 NOTE — ASSESSMENT & PLAN NOTE
Off anticoagulation due to hemoptysis.  Treat CHF.   IR SP IVC filter    Would be willing to reconsider re challenge with lower dose Elliquis: 2.5 mg

## 2022-09-29 NOTE — PROGRESS NOTES
[facility] Medicine  Progress Note    Patient Name: Teresa Echols  MRN: [id number]  Patient Class: IP- Inpatient   Admission Date: 9/22/2022  Length of Stay: 4 days  Attending Physician: Simeon Maldonado MD  Primary Care Provider: Juan Contreras MD        Subjective:     Principal Problem:Hemoptysis        HPI:  Ms. Echols is a 37-year-old  female with PMH significant for systolic CHF (EF 25% on echo done June 2022), chronic cocaine user, right lower extremity DVT status post thrombectomy on in June), presented to [facility] ED complaining of coughing up blood the past 2-3 months, since the thrombectomy procedure.  Patient on Xarelto.  States that she has been to multiple hospitals for the same issue, was told that there is nothing abnormal and was sent home.  Denies chest pain, but complains of SOB, worsen with exertion.  Denies fever, chills.  /68.  Afebrile.  HR .  SaO2 100% on 2 L O2 N/C (not home oxygen dependent).  Laboratory workup reveals hemoglobin 7.3, MCV 81, INR 1.4.  D-dimer elevated 2.65.  CTA chest negative for PE, but reveals enlarged cardiac silhouette, right heart strain, increased vascular congestion.  BNP elevated 3610.  Troponin 0.024.  UDS pending.  Received Lasix 40 mg IV x1 in the ED.    Admitting diagnosis:  Hemoptysis.  Acute on chronic systolic CHF.  Chronic cocaine user.  History of right leg DVT, status post thrombectomy in June 2022.  On Xarelto.      Overview/Hospital Course:  9/23: 37-year-old female admitted for hemoptysis, upon further investigation patient has blood-tinged sputum not vomitus.  Iron studies pending.  Since stopping Eliquis this hospitalization she has not had any blood-tinged sputum production.  BB resumed and will further diurese.  Monitor H/ H    9/24: Iron studies c/w with anemia, although Hgb improved. Continues to cough up blood tinged sputum which is likely related to CHF and pulm edema. R/LHC Monday  per Cards. Diurese and monitor strict I&Os. CT chest showed concern for PNA vs edema but no signs of lesion. No leukocytosis, afebrile. Cont treating UTI.    9/25: Episode of chest pain this am. Trops negative. Cont IV diuresis with Lasix. Plan for R/LHC tomorrow. Cards following. Hgb stable and improved to 7.9 despite patient coughing up bloody sputum and as stated above likely from right heart failure and pulm edema.    9/26: Cont IV diuresis and once volume status improved plan for MPI stress test per Cards. Hgb improving. Procal negative but CRP elevated treating for PNA empirically. Cont holding Eliquis and send sputum to micro.     9/27: Cont IV diuresis and optimize for MPI stress test. Will add midodrine to augment BP because patient definitely needs IV diuresis. Cont abx. Hemoptysis slowly improving.     9/28: Will need an IVC filter because unable to tolerate PO AC. Scheduled to be placed today per IR. Hgb remains stable. CXR this am showed no significant change--per pulm possible Bronch? Cards following.     9/29: Initiated on low dose Eliquis per Pulm recs. IVC filter in place. Needs Lasix low dose per Cards with close follow up. Discharge on PO Levaquinx5 days.  Pulm and cards referrals sent. If stable d/c in the am       Interval History: Initiated on low dose Eliquis per Pulm recs. IVC filter in place. Needs Lasix low dose per Cards with close follow up. Discharge on PO Levaquinx5 days.  Pulm and cards referrals sent. If stable d/c in the am     Review of Systems   Constitutional:  Negative for chills, fatigue and fever.   HENT: Negative.  Negative for congestion, rhinorrhea, sore throat and trouble swallowing.    Eyes: Negative.    Respiratory:  Positive for cough (blood) and shortness of breath. Negative for wheezing.    Cardiovascular:  Negative for chest pain, palpitations and leg swelling.   Gastrointestinal: Negative.  Negative for abdominal pain, diarrhea, nausea and vomiting.   Endocrine:  Negative.    Genitourinary: Negative.  Negative for dysuria and flank pain.   Musculoskeletal: Negative.  Negative for back pain.   Skin: Negative.  Negative for rash.   Allergic/Immunologic: Negative.    Neurological: Negative.  Negative for speech difficulty, weakness, numbness and headaches.   Hematological: Negative.    Psychiatric/Behavioral: Negative.  Negative for hallucinations. The patient is not nervous/anxious.    All other systems reviewed and are negative.  Objective:     Vital Signs (Most Recent):  Temp: 98 °F (36.7 °C) (09/29/22 1149)  Pulse: 100 (09/29/22 1542)  Resp: 18 (09/29/22 1149)  BP: 96/66 (09/29/22 1149)  SpO2: 99 % (09/29/22 1149) Vital Signs (24h Range):  Temp:  [97.4 °F (36.3 °C)-98.9 °F (37.2 °C)] 98 °F (36.7 °C)  Pulse:  [] 100  Resp:  [14-18] 18  SpO2:  [98 %-100 %] 99 %  BP: ()/(54-71) 96/66     Weight: 55.4 kg (122 lb 2.2 oz)  Body mass index is 20.96 kg/m².    Intake/Output Summary (Last 24 hours) at 9/29/2022 1632  Last data filed at 9/28/2022 1800  Gross per 24 hour   Intake 200 ml   Output --   Net 200 ml      Physical Exam  Vitals and nursing note reviewed.   Constitutional:       General: She is awake. She is not in acute distress.     Appearance: She is ill-appearing.      Interventions: Nasal cannula in place.   HENT:      Head: Normocephalic and atraumatic.      Mouth/Throat:      Mouth: Mucous membranes are moist.   Eyes:      General: No scleral icterus.     Conjunctiva/sclera: Conjunctivae normal.   Cardiovascular:      Rate and Rhythm: Normal rate and regular rhythm.      Heart sounds: No murmur heard.  Pulmonary:      Effort: Pulmonary effort is normal. No respiratory distress.      Breath sounds: No wheezing, rhonchi or rales.      Comments: Blood tinged sputum  Abdominal:      Palpations: Abdomen is soft.      Tenderness: There is no abdominal tenderness.   Musculoskeletal:         General: No swelling (Trace edema bilateral ankles). Normal range of  motion.      Cervical back: Normal range of motion and neck supple.   Skin:     General: Skin is warm.   Neurological:      General: No focal deficit present.      Mental Status: She is alert and oriented to person, place, and time. Mental status is at baseline.   Psychiatric:         Attention and Perception: Attention normal.         Mood and Affect: Affect is flat.         Speech: Speech normal.         Behavior: Behavior is cooperative.       Significant Labs: All pertinent labs within the past 24 hours have been reviewed.    Significant Imaging: I have reviewed all pertinent imaging results/findings within the past 24 hours.      Assessment/Plan:      * Hemoptysis  -holding Eliquis  -BP stable  -CTA chest negative for PE or masses    9/24:  Cont holding Eliquis  Differentials include CHF-Pulm edema vs GI issue. CTA chest negative for masses  R/LHC on Monday per Cards  Hgb remains stable    9/25:  Hgb stable and improved to 7.9 despite patient coughing up bloody sputum and as stated above likely from right heart failure and pulm edema.    9/26:  Cont IV diuresis and once volume status improved plan for MPI stress test per Cards. Hgb improving. Procal negative but CRP elevated treating for PNA empirically. Cont holding Eliquis and send sputum to micro.     9/27:  Cont IV diuresis and optimize for MPI stress test. Will add midodrine to augment BP because patient definitely needs IV diuresis.    9/28:  Improving   Will need an IVC filter because unable to tolerate PO AC. Scheduled to be placed today per IR  Hgb remains stable  CXR this am showed no significant change--per pulm possible Bronch?   Cards following.     9/29:  Initiated on low dose Eliquis per Pulm recs. IVC filter in place. Needs Lasix low dose per Cards with close follow up. Discharge on PO Levaquinx5 days.  Pulm and cards referrals sent. 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 Scheduled to be placed today per IR. Hgb remains stable. CXR this am showed no significant change--per pulm possible Bronch?     Urinary tract infection with hematuria  UA c/w UTI  IV Rocephin     9/29:  Completed treatment       SOB (shortness of breath)  Resolved       Anemia  Iron studies c/w anemia  Does not warrant transfusion currently    9/26:  hgb now 8.0    9/27:  Stable      Tobacco use    Assistance with smoking cessation was offered, including:  []  Medications  [x]  Counseling  []  Printed Information on Smoking Cessation  []  Referral to a Smoking Cessation Program    Patient was counseled regarding smoking for 3-10 minutes.        Non compliance w medication regimen         Cocaine abuse  -per Care Everywhere, has been snorting cocaine since age 18  -UDS pending, hold BB until resulted    9/24:  UDS negative  BB resumed     Acute on chronic systolic congestive heart failure  -EF 25% on echo done in June 2022 at [facility]  -repeat echo here  -Lasix 40 mg IV BID x 2 doses, re-evaluate  -CHF pathway    9/24:  Patient is identified as having Combined Systolic and Diastolic heart failure that is Acute on chronic. CHF is currently uncontrolled due to Rales/crackles on pulmonary exam and Pulmonary edema/pleural effusion on CXR. Latest ECHO performed and demonstrates- Results for orders placed during the hospital encounter of 09/22/22    Echo    Interpretation Summary  · The left ventricle is severely enlarged with concentric hypertrophy and severely decreased systolic function.  · The estimated ejection fraction is 15%.  · Severe left atrial enlargement.  · Severe right atrial enlargement.  · Grade III left ventricular diastolic dysfunction.  · The estimated PA systolic pressure is 106 mmHg.  · Normal right ventricular size with mildly reduced right  ventricular systolic function.  · There is pulmonary hypertension.  · Elevated central venous pressure (15 mmHg).  · Moderate to severe tricuspid regurgitation.  · There is severe left ventricular global hypokinesis.  · Moderate pulmonic regurgitation.  · Moderate mitral regurgitation.  · There is a small right pleural effusion.  . Continue Beta Blocker and Furosemide and monitor clinical status closely. Monitor on telemetry. Patient is on CHF pathway.  Monitor strict Is&Os and daily weights.  Place on fluid restriction of 1.5 L. Continue to stress to patient importance of self efficacy and  on diet for CHF. Last BNP reviewed- and noted below   Recent Labs   Lab 09/28/22 0957   BNP 2,516*     Lasix IV  Strict I&Os  Daily weights  1500ml fluid restriction  2gm Na diet  Cards consult   Plan for R/LHC on Monday 9/25:  Episode of chest pain this am  Trops negative  Cont IV diuresis with Lasix.  Plan for R/LHC tomorrow  Cards following  Hgb stable and improved to 7.9 despite patient coughing up bloody sputum and as stated above likely from right heart failure and pulm edema..     9/26:  Cont Iv lasix  Needs improved volume status prior to MPI stress test    9/27:  Cont IV diuresis and optimize for MPI stress test. Will add midodrine to augment BP because patient definitely needs IV diuresis.     9/28:  Midodrine helping with BP  Cont diuresis     9/29:  Initiated on low dose Eliquis per Pulm recs. IVC filter in place. Needs Lasix low dose per Cards with close follow up.   Pulm and cards referrals sent. If stable d/c in the am       VTE Risk Mitigation (From admission, onward)         Ordered     apixaban tablet 2.5 mg  2 times daily         09/29/22 1211     Place sequential compression device  Until discontinued         09/23/22 0539     IP VTE HIGH RISK PATIENT  Once         09/23/22 0539                Discharge Planning   PEPITO: 9/29/2022     Code Status: Full Code   Is the patient medically ready for  discharge?:     Reason for patient still in hospital (select all that apply): Patient trending condition  Discharge Plan A: Home   Discharge Delays: None known at this time              Luna Escalante NP  Department of Hospital Medicine   [facility]

## 2022-09-29 NOTE — ASSESSMENT & PLAN NOTE
-EF 25% on echo done in June 2022 at [facility]  -repeat echo here  -Lasix 40 mg IV BID x 2 doses, re-evaluate  -CHF pathway    9/24:  Patient is identified as having Combined Systolic and Diastolic heart failure that is Acute on chronic. CHF is currently uncontrolled due to Rales/crackles on pulmonary exam and Pulmonary edema/pleural effusion on CXR. Latest ECHO performed and demonstrates- Results for orders placed during the hospital encounter of 09/22/22    Echo    Interpretation Summary  · The left ventricle is severely enlarged with concentric hypertrophy and severely decreased systolic function.  · The estimated ejection fraction is 15%.  · Severe left atrial enlargement.  · Severe right atrial enlargement.  · Grade III left ventricular diastolic dysfunction.  · The estimated PA systolic pressure is 106 mmHg.  · Normal right ventricular size with mildly reduced right ventricular systolic function.  · There is pulmonary hypertension.  · Elevated central venous pressure (15 mmHg).  · Moderate to severe tricuspid regurgitation.  · There is severe left ventricular global hypokinesis.  · Moderate pulmonic regurgitation.  · Moderate mitral regurgitation.  · There is a small right pleural effusion.  . Continue Beta Blocker and Furosemide and monitor clinical status closely. Monitor on telemetry. Patient is on CHF pathway.  Monitor strict Is&Os and daily weights.  Place on fluid restriction of 1.5 L. Continue to stress to patient importance of self efficacy and  on diet for CHF. Last BNP reviewed- and noted below   Recent Labs   Lab 09/28/22  0957   BNP 2,516*     Lasix IV  Strict I&Os  Daily weights  1500ml fluid restriction  2gm Na diet  Cards consult   Plan for R/LHC on Monday 9/25:  Episode of chest pain this am  Trops negative  Cont IV diuresis with Lasix.  Plan for R/LHC tomorrow  Cards following  Hgb stable and improved to 7.9 despite patient coughing up bloody sputum and as stated above likely from right  heart failure and pulm edema..     9/26:  Cont Iv lasix  Needs improved volume status prior to MPI stress test    9/27:  Cont IV diuresis and optimize for MPI stress test. Will add midodrine to augment BP because patient definitely needs IV diuresis.     9/28:  Midodrine helping with BP  Cont diuresis     9/29:  Initiated on low dose Eliquis per Pulm recs. IVC filter in place. Needs Lasix low dose per Cards with close follow up.   Pulm and cards referrals sent. If stable d/c in the am

## 2022-09-29 NOTE — PLAN OF CARE
Problem: Adult Inpatient Plan of Care  Goal: Readiness for Transition of Care  Outcome: Ongoing, Progressing     Problem: Adult Inpatient Plan of Care  Goal: Optimal Comfort and Wellbeing  Outcome: Ongoing, Progressing     Problem: Fall Injury Risk  Goal: Absence of Fall and Fall-Related Injury  Outcome: Ongoing, Progressing     Problem: Infection  Goal: Absence of Infection Signs and Symptoms  Outcome: Ongoing, Progressing     Problem: Skin Injury Risk Increased  Goal: Skin Health and Integrity  Outcome: Ongoing, Progressing

## 2022-09-30 VITALS
HEART RATE: 95 BPM | TEMPERATURE: 99 F | DIASTOLIC BLOOD PRESSURE: 62 MMHG | WEIGHT: 115.31 LBS | RESPIRATION RATE: 16 BRPM | HEIGHT: 64 IN | BODY MASS INDEX: 19.68 KG/M2 | OXYGEN SATURATION: 99 % | SYSTOLIC BLOOD PRESSURE: 103 MMHG

## 2022-09-30 PROBLEM — R79.89 ELEVATED D-DIMER: Status: RESOLVED | Noted: 2022-09-30 | Resolved: 2022-09-30

## 2022-09-30 PROBLEM — E53.8 LOW FOLIC ACID: Status: ACTIVE | Noted: 2022-09-30

## 2022-09-30 PROBLEM — R06.02 SOB (SHORTNESS OF BREATH): Status: RESOLVED | Noted: 2022-09-23 | Resolved: 2022-09-30

## 2022-09-30 PROBLEM — R17 ELEVATED BILIRUBIN: Status: ACTIVE | Noted: 2022-09-30

## 2022-09-30 PROBLEM — Z72.0 TOBACCO USE: Status: ACTIVE | Noted: 2022-09-30

## 2022-09-30 PROBLEM — E44.1 MILD MALNUTRITION: Status: ACTIVE | Noted: 2022-09-30

## 2022-09-30 PROBLEM — R79.89 ELEVATED D-DIMER: Status: ACTIVE | Noted: 2022-09-30

## 2022-09-30 PROBLEM — R04.2 HEMOPTYSIS: Status: RESOLVED | Noted: 2022-09-23 | Resolved: 2022-09-30

## 2022-09-30 PROBLEM — F17.200 NICOTINE DEPENDENCE: Status: ACTIVE | Noted: 2022-09-23

## 2022-09-30 PROBLEM — E87.1 HYPONATREMIA: Status: ACTIVE | Noted: 2022-09-30

## 2022-09-30 PROBLEM — E61.1 LOW IRON: Status: ACTIVE | Noted: 2022-09-30

## 2022-09-30 PROBLEM — E83.42 HYPOMAGNESEMIA: Status: ACTIVE | Noted: 2022-09-30

## 2022-09-30 LAB
ANION GAP SERPL CALC-SCNC: 14 MMOL/L (ref 8–16)
BASOPHILS # BLD AUTO: 0.05 K/UL (ref 0–0.2)
BASOPHILS NFR BLD: 0.9 % (ref 0–1.9)
BUN SERPL-MCNC: 23 MG/DL (ref 6–20)
CALCIUM SERPL-MCNC: 8.8 MG/DL (ref 8.7–10.5)
CHLORIDE SERPL-SCNC: 101 MMOL/L (ref 95–110)
CO2 SERPL-SCNC: 18 MMOL/L (ref 23–29)
CREAT SERPL-MCNC: 1.2 MG/DL (ref 0.5–1.4)
DIFFERENTIAL METHOD: ABNORMAL
EOSINOPHIL # BLD AUTO: 0.3 K/UL (ref 0–0.5)
EOSINOPHIL NFR BLD: 4.7 % (ref 0–8)
ERYTHROCYTE [DISTWIDTH] IN BLOOD BY AUTOMATED COUNT: 24.8 % (ref 11.5–14.5)
EST. GFR  (NO RACE VARIABLE): >60 ML/MIN/1.73 M^2
GLUCOSE SERPL-MCNC: 105 MG/DL (ref 70–110)
HCT VFR BLD AUTO: 26.6 % (ref 37–48.5)
HGB BLD-MCNC: 7.2 G/DL (ref 12–16)
IMM GRANULOCYTES # BLD AUTO: 0.02 K/UL (ref 0–0.04)
IMM GRANULOCYTES NFR BLD AUTO: 0.4 % (ref 0–0.5)
LYMPHOCYTES # BLD AUTO: 1.7 K/UL (ref 1–4.8)
LYMPHOCYTES NFR BLD: 29.7 % (ref 18–48)
MAGNESIUM SERPL-MCNC: 1.6 MG/DL (ref 1.6–2.6)
MCH RBC QN AUTO: 22.3 PG (ref 27–31)
MCHC RBC AUTO-ENTMCNC: 27.1 G/DL (ref 32–36)
MCV RBC AUTO: 82 FL (ref 82–98)
MONOCYTES # BLD AUTO: 0.3 K/UL (ref 0.3–1)
MONOCYTES NFR BLD: 6.1 % (ref 4–15)
NEUTROPHILS # BLD AUTO: 3.2 K/UL (ref 1.8–7.7)
NEUTROPHILS NFR BLD: 58.2 % (ref 38–73)
NRBC BLD-RTO: 0 /100 WBC
PLATELET # BLD AUTO: 223 K/UL (ref 150–450)
PMV BLD AUTO: 10.4 FL (ref 9.2–12.9)
POTASSIUM SERPL-SCNC: 4.2 MMOL/L (ref 3.5–5.1)
RBC # BLD AUTO: 3.23 M/UL (ref 4–5.4)
SODIUM SERPL-SCNC: 133 MMOL/L (ref 136–145)
WBC # BLD AUTO: 5.56 K/UL (ref 3.9–12.7)

## 2022-09-30 PROCEDURE — 63600175 PHARM REV CODE 636 W HCPCS: Performed by: NURSE PRACTITIONER

## 2022-09-30 PROCEDURE — 99232 PR SUBSEQUENT HOSPITAL CARE,LEVL II: ICD-10-PCS | Mod: ,,, | Performed by: PHYSICIAN ASSISTANT

## 2022-09-30 PROCEDURE — 25000003 PHARM REV CODE 250: Performed by: INTERNAL MEDICINE

## 2022-09-30 PROCEDURE — 83735 ASSAY OF MAGNESIUM: CPT | Performed by: PHYSICIAN ASSISTANT

## 2022-09-30 PROCEDURE — 36415 COLL VENOUS BLD VENIPUNCTURE: CPT | Performed by: PHYSICIAN ASSISTANT

## 2022-09-30 PROCEDURE — 25000003 PHARM REV CODE 250: Performed by: NURSE PRACTITIONER

## 2022-09-30 PROCEDURE — 80048 BASIC METABOLIC PNL TOTAL CA: CPT | Performed by: PHYSICIAN ASSISTANT

## 2022-09-30 PROCEDURE — 36415 COLL VENOUS BLD VENIPUNCTURE: CPT | Performed by: NURSE PRACTITIONER

## 2022-09-30 PROCEDURE — 99232 SBSQ HOSP IP/OBS MODERATE 35: CPT | Mod: ,,, | Performed by: PHYSICIAN ASSISTANT

## 2022-09-30 PROCEDURE — 85025 COMPLETE CBC W/AUTO DIFF WBC: CPT | Performed by: NURSE PRACTITIONER

## 2022-09-30 PROCEDURE — 63600175 PHARM REV CODE 636 W HCPCS: Performed by: STUDENT IN AN ORGANIZED HEALTH CARE EDUCATION/TRAINING PROGRAM

## 2022-09-30 PROCEDURE — 99900035 HC TECH TIME PER 15 MIN (STAT)

## 2022-09-30 RX ORDER — ASCORBIC ACID 500 MG
500 TABLET ORAL NIGHTLY
Status: DISCONTINUED | OUTPATIENT
Start: 2022-09-30 | End: 2022-09-30 | Stop reason: HOSPADM

## 2022-09-30 RX ORDER — ACETAMINOPHEN 325 MG/1
650 TABLET ORAL EVERY 6 HOURS PRN
Refills: 0
Start: 2022-09-30

## 2022-09-30 RX ORDER — FOLIC ACID 1 MG/1
1 TABLET ORAL DAILY
Status: DISCONTINUED | OUTPATIENT
Start: 2022-09-30 | End: 2022-09-30 | Stop reason: HOSPADM

## 2022-09-30 RX ORDER — CYANOCOBALAMIN 1000 UG/ML
1000 INJECTION, SOLUTION INTRAMUSCULAR; SUBCUTANEOUS ONCE
Status: COMPLETED | OUTPATIENT
Start: 2022-09-30 | End: 2022-09-30

## 2022-09-30 RX ORDER — FERROUS GLUCONATE 324(37.5)
324 TABLET ORAL 2 TIMES DAILY WITH MEALS
Status: DISCONTINUED | OUTPATIENT
Start: 2022-10-02 | End: 2022-09-30 | Stop reason: HOSPADM

## 2022-09-30 RX ORDER — FERROUS SULFATE 324(65)MG
324 TABLET, DELAYED RELEASE (ENTERIC COATED) ORAL 2 TIMES DAILY WITH MEALS
Qty: 60 TABLET | Refills: 0 | Status: SHIPPED | OUTPATIENT
Start: 2022-09-30 | End: 2022-10-30

## 2022-09-30 RX ORDER — ASCORBIC ACID 500 MG
500 TABLET ORAL NIGHTLY
Qty: 30 TABLET | Refills: 0 | Status: SHIPPED | OUTPATIENT
Start: 2022-09-30 | End: 2022-10-30

## 2022-09-30 RX ORDER — FOLIC ACID 1 MG/1
1 TABLET ORAL DAILY
Qty: 30 TABLET | Refills: 0 | Status: SHIPPED | OUTPATIENT
Start: 2022-10-01 | End: 2022-10-31

## 2022-09-30 RX ADMIN — DOXYCYCLINE HYCLATE 100 MG: 100 TABLET, COATED ORAL at 08:09

## 2022-09-30 RX ADMIN — FOLIC ACID 1 MG: 1 TABLET ORAL at 11:09

## 2022-09-30 RX ADMIN — IRON SUCROSE 200 MG: 20 INJECTION, SOLUTION INTRAVENOUS at 01:09

## 2022-09-30 RX ADMIN — APIXABAN 2.5 MG: 2.5 TABLET, FILM COATED ORAL at 08:09

## 2022-09-30 RX ADMIN — FUROSEMIDE 40 MG: 10 INJECTION, SOLUTION INTRAVENOUS at 08:09

## 2022-09-30 RX ADMIN — ACETAMINOPHEN 650 MG: 325 TABLET ORAL at 08:09

## 2022-09-30 RX ADMIN — THERA TABS 1 TABLET: TAB at 11:09

## 2022-09-30 RX ADMIN — ATORVASTATIN CALCIUM 40 MG: 40 TABLET, FILM COATED ORAL at 08:09

## 2022-09-30 RX ADMIN — LINEZOLID 600 MG: 600 TABLET, FILM COATED ORAL at 08:09

## 2022-09-30 RX ADMIN — CYANOCOBALAMIN 1000 MCG: 1000 INJECTION, SOLUTION INTRAMUSCULAR; SUBCUTANEOUS at 11:09

## 2022-09-30 RX ADMIN — CEFTRIAXONE 1 G: 1 INJECTION, SOLUTION INTRAVENOUS at 05:09

## 2022-09-30 NOTE — ASSESSMENT & PLAN NOTE
-Patient with known CHF, EF 25% by last echo in Care Everywhere  -Grossly overloaded on exam  -Continue IV diuresis  -Resume BB if UDS negative  -Will attempt to add ARB vs Entresto pending BP trend  -Repeat Echo pending  -Patient with history of medication non-compliance  -Counseled on dietary and fluid restrictions    9/24/22  ED is negative for cocaine, metoprolol started  Repeat echo with EF 15%, moderate to severe TR, RVSP 106 mmhg-> consistent with volume overload   Continue IV diuresis   Monitor urine output  Blood pressure labile  Will need ischemia evaluation with R/LHC during this admission     9/25/22  Beta-blocker held due to hypotension  Lactate 2.2, higher limit of normal   Will try metolazone 2.5 mg and assess output   Will consider starting dobutamine if still decrease urine output  R/LHC possibly tomorrow (tentative), pending patient's volume status  Keep NPO at midnight    9/26/22  -BNP still elevated  -Continue IV diuresis   -Limited with OMT given borderline BP  -Ischemic evaluation with MPI stress test once hemoptysis/volume status improved    9/27/22  -Continue IV diuresis  -Will optimize regimen as tolerated  -Ischemic evaluation once stable    09/28  Add Lasix on d/c order  No ACEI and BB due to low BP  F/u as OP

## 2022-09-30 NOTE — PROGRESS NOTES
Home Oxygen Evaluation - [facility] - Cardiopulmonary Department      Date Performed: 9/30/2022      1) Patient's Home O2 Sat on room air, while at rest: Room Air SpO2 At Rest: 99 %        If O2 sats on room air at rest are 88% or below, patient qualifies.  Document O2 liter flow needed in Step 2.  If O2 sats are 89% or above, complete Step 3.        2)  If patient is not ambulated and O2 sats are <88%, what is the O2 liter flow required to meet ordered saturation?      If O2 sats on room air while exercising remain 89% or above patient does not qualify, no further testing needed Document N/A in step 3. If O2 sats on room air while exercising are 88% or below, continue to Step 4.    3) Patient's O2 Sat on room air while exercising: Room Air SpO2 During Ambulation: 100 % (walked down the britt and back then around nurses station)        4) Patient's O2 Sat while exercising on O2:           (Must show improvement from #4 for patients to qualify)

## 2022-09-30 NOTE — CARE UPDATE
Patient reports feeling better and wants discharge home while mother here so she has a ride home.

## 2022-09-30 NOTE — PROGRESS NOTES
"[facility]  Cardiology  Progress Note    Patient Name: Teresa Echols  MRN: [id number]  Admission Date: 9/22/2022  Hospital Length of Stay: 4 days  Code Status: Full Code   Attending Physician: Simeon Maldonado MD   Primary Care Physician: Juan Contreras MD  Expected Discharge Date: 9/29/2022  Principal Problem:Hemoptysis    Subjective:     Hospital Course:   Ms. Echols is a 37 year old female patient whose current medical conditions include chronic systolic CHF (25% per echo 6/22 in Care Everywhere), history of cocaine abuse, RLE DVT s/p thrombectomy in 6/22, CHALINO, and non-compliance who presented to [facility] ED yesterday with a chief complaint of worsening SOB over the past few days. Associated symptoms included hemoptysis, BLE edema, abdominal bloating, and orthopnea. She denied any associated fever, chills, palpitations, near syncope, or syncope. Initial workup in ED revealed anemia (H/H 7.3/25.8), D-dimer +, and BNP of 3610. CTA negative for PE but did show findings consistent with vascular congestion and right heart strain and patient was subsequently admitted for further evaluation and treatment. Cardiology consulted to assist with management. Patient seen and examined today, resting in bed. Remains SOB. Denies osvaldo chest pain but does admit to some left upper chest "tingling". States she has been having issues with SOB/fluid overload ever since her thrombectomy/hospital admission in June. She claims she has been compliant with her medications, but has been using Lasix prn and taking Eliquis only once daily. Admits to eating 2 bags of potato chips prior to admission. Chart reviewed. Initial troponin negative. Repeat echo pending.      9/24/22- doing well.  Abdomen still.  Reports good urine output.  Lasix has been held.  Patient hypotensive.  Blood thinner stopped due to hemoptysis.  Repeat echo with EF 15%, mod-severe TR, RVSP 106 mmhg    9/25/22- continues to have abdominal distension.  Continues to " have hemoptysis, stable Hgb.  Was not started on OAC during admission.  Lactate 2.2, upper limit of normal.  C/o left sided pain. K borderline. BB stopped due to hypotension. Does not feel it she urinating a lot.    9/26/22-Patient seen and examined today, sitting up in bed. Feels ok. SOB slowly improving. Still with abdominal bloating as well as hemoptysis. Pulmonary on board. Labs reviewed. H/H stable. BNP remains elevated.     9/27/22-Patient seen and examined today, resting in bed. SOB and hemoptysis slowly improving. CXR yesterday concerning for infiltrate, remains on abx. Pulmonary following. Labs reviewed. May need IVC filter.    9/28/22-Patient seen and examined today, sitting up in bed. Still with hemoptysis, although reports improvement. Diuresed well overnight with addition of midodrine. IVC filter planned today. Labs relatively stable.    09/29  IVC filter placed and resumed elquis 2.5 mg bid per pulm rec.       ROS  Objective:     Vital Signs (Most Recent):  Temp: 97.5 °F (36.4 °C) (09/29/22 1912)  Pulse: 98 (09/29/22 1912)  Resp: 16 (09/29/22 1912)  BP: 99/66 (09/29/22 1912)  SpO2: 99 % (09/29/22 1912) Vital Signs (24h Range):  Temp:  [97.4 °F (36.3 °C)-98.9 °F (37.2 °C)] 97.5 °F (36.4 °C)  Pulse:  [] 98  Resp:  [14-20] 16  SpO2:  [95 %-100 %] 99 %  BP: ()/(54-70) 99/66     Weight: 55.4 kg (122 lb 2.2 oz)  Body mass index is 20.96 kg/m².     SpO2: 99 %  O2 Device (Oxygen Therapy): nasal cannula      Intake/Output Summary (Last 24 hours) at 9/29/2022 2207  Last data filed at 9/29/2022 1923  Gross per 24 hour   Intake 49.84 ml   Output --   Net 49.84 ml       Lines/Drains/Airways       Peripheral Intravenous Line  Duration                  Peripheral IV - Single Lumen 09/26/22 0652 22 G Left;Posterior Hand 3 days                    Physical Exam  Vitals and nursing note reviewed.   Constitutional:       General: She is not in acute distress.     Appearance: Normal appearance. She is  well-developed. She is not diaphoretic.   HENT:      Head: Normocephalic and atraumatic.   Eyes:      General:         Right eye: No discharge.         Left eye: No discharge.      Pupils: Pupils are equal, round, and reactive to light.   Neck:      Thyroid: No thyromegaly.      Vascular: No JVD.      Trachea: No tracheal deviation.   Cardiovascular:      Rate and Rhythm: Regular rhythm. Tachycardia present.      Heart sounds: Normal heart sounds, S1 normal and S2 normal. No murmur heard.  Pulmonary:      Effort: Pulmonary effort is normal. No respiratory distress.      Breath sounds: Normal breath sounds. No wheezing or rales.   Abdominal:      General: There is no distension.      Tenderness: There is no rebound.   Musculoskeletal:      Cervical back: Neck supple.      Right lower leg: No edema.      Left lower leg: No edema.   Skin:     General: Skin is warm and dry.      Findings: No erythema.   Neurological:      General: No focal deficit present.      Mental Status: She is alert and oriented to person, place, and time.   Psychiatric:         Mood and Affect: Mood normal.         Behavior: Behavior normal.         Thought Content: Thought content normal.       Significant Labs: ABG: No results for input(s): PH, PCO2, HCO3, POCSATURATED, BE in the last 48 hours., Blood Culture: No results for input(s): LABBLOO in the last 48 hours., BMP: No results for input(s): GLU, NA, K, CL, CO2, BUN, CREATININE, CALCIUM, MG in the last 48 hours., CMP No results for input(s): NA, K, CL, CO2, GLU, BUN, CREATININE, CALCIUM, PROT, ALBUMIN, BILITOT, ALKPHOS, AST, ALT, ANIONGAP, ESTGFRAFRICA, EGFRNONAA in the last 48 hours., CBC   Recent Labs   Lab 09/28/22  0517 09/29/22  0458   WBC 5.79 4.85   HGB 7.4* 7.6*   HCT 25.1* 27.0*    209   , INR No results for input(s): INR, PROTIME in the last 48 hours., Lipid Panel No results for input(s): CHOL, HDL, LDLCALC, TRIG, CHOLHDL in the last 48 hours., and Troponin No results for  input(s): TROPONINI in the last 48 hours.    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  -AC on hold given hemoptysis  -IVC filter planned today    Anemia  -Hgb range 7-8  -Transfuse prn, as per hospital medicine      Tobacco use  -Denies recent usage    Non compliance w medication regimen  -Counseled on compliance    Cocaine abuse  -UDS negative  -Denies recent use    Acute on chronic systolic congestive heart failure  -Patient with known CHF, EF 25% by last echo in Care Everywhere  -Grossly overloaded on exam  -Continue IV diuresis  -Resume BB if UDS negative  -Will attempt to add ARB vs Entresto pending BP trend  -Repeat Echo pending  -Patient with history of medication non-compliance  -Counseled on dietary and fluid restrictions    9/24/22  ED is negative for cocaine, metoprolol started  Repeat echo with EF 15%, moderate to severe TR, RVSP 106 mmhg-> consistent with volume overload   Continue IV diuresis   Monitor urine output  Blood pressure labile  Will need ischemia evaluation with R/LHC during this admission     9/25/22  Beta-blocker held due to hypotension  Lactate 2.2, higher limit of normal   Will try metolazone 2.5 mg and assess output   Will consider starting dobutamine if still decrease urine output  R/LHC possibly tomorrow (tentative), pending patient's volume status  Keep NPO at midnight    9/26/22  -BNP still elevated  -Continue IV diuresis   -Limited with OMT given borderline BP  -Ischemic evaluation with MPI stress test once hemoptysis/volume status improved    9/27/22  -Continue IV diuresis  -Will optimize regimen as tolerated  -Ischemic evaluation once  stable    09/28  Add Lasix on d/c order  No ACEI and BB due to low BP  F/u as OP        VTE Risk Mitigation (From admission, onward)         Ordered     apixaban tablet 2.5 mg  2 times daily         09/29/22 1211     Place sequential compression device  Until discontinued         09/23/22 0539     IP VTE HIGH RISK PATIENT  Once         09/23/22 0503                Edilberto Lu MD  Cardiology  '[facility]

## 2022-09-30 NOTE — PLAN OF CARE
CM received a message from Sabra at [facility], per sabra patient's lifevest has been approved. Fitting will be today between 4:30 and 5:30.

## 2022-09-30 NOTE — PLAN OF CARE
[facility]  Discharge Final Note    Primary Care Provider: Juan Contreras MD    Expected Discharge Date: 9/30/2022    Final Discharge Note (most recent)       Final Note - 09/30/22 1218          Final Note    Assessment Type Final Discharge Note     Anticipated Discharge Disposition Home or Self Care     Hospital Resources/Appts/Education Provided Provided patient/caregiver with written discharge plan information;Appointments scheduled and added to AVS        Post-Acute Status    Discharge Delays None known at this time                     Important Message from Medicare             Contact Info       Juan Contreras MD   Specialty: Family Medicine   Relationship: PCP - General    [address] [place] [address]   Phone: [phone]       Next Steps: Follow up on 10/7/2022    Instructions: For post hospital visit stay check up 10:00AM    cardiology        Next Steps: Follow up in 1 week(s)    pulmonology        Next Steps: Follow up in 2 week(s)

## 2022-09-30 NOTE — DISCHARGE SUMMARY
[facility] Medicine  Discharge Summary    Patient Name: Teresa Echols  MRN: [id number]  Patient Class: IP- Inpatient  Admission Date: 9/22/2022  Hospital Length of Stay: 5 days  Discharge Date and Time:  09/30/2022 2:02 PM  Attending Physician: Simeon Mladonado MD   Discharging Provider: YULISA Fitch  Primary Care Provider: Juan Contreras MD    HPI:   Ms. Echols is a 37-year-old  female with PMH significant for systolic CHF (EF 25% on echo done June 2022), chronic cocaine user, right lower extremity DVT status post thrombectomy on in June), presented to [facility] ED complaining of coughing up blood the past 2-3 months, since the thrombectomy procedure.  Patient on Xarelto.  States that she has been to multiple hospitals for the same issue, was told that there is nothing abnormal and was sent home.  Denies chest pain, but complains of SOB, worsen with exertion.  Denies fever, chills.  /68.  Afebrile.  HR .  SaO2 100% on 2 L O2 N/C (not home oxygen dependent).  Laboratory workup reveals hemoglobin 7.3, MCV 81, INR 1.4.  D-dimer elevated 2.65.  CTA chest negative for PE, but reveals enlarged cardiac silhouette, right heart strain, increased vascular congestion.  BNP elevated 3610.  Troponin 0.024.  UDS pending.  Received Lasix 40 mg IV x1 in the ED.    Admitting diagnosis:  Hemoptysis.  Acute on chronic systolic CHF.  Chronic cocaine user.  History of right leg DVT, status post thrombectomy in June 2022.  On Xarelto.    Procedure(s) (LRB):  INSERTION, FILTER, INFERIOR VENA CAVA (N/A)      Hospital Course:   9/23: 37-year-old female admitted for hemoptysis, upon further investigation patient has blood-tinged sputum not vomitus.  Iron studies pending.  Since stopping Eliquis this hospitalization she has not had any blood-tinged sputum production.  BB resumed and will further diurese.  Monitor H/ H    9/24: Iron studies c/w with anemia, although Hgb  improved. Continues to cough up blood tinged sputum which is likely related to CHF and pulm edema. R/LHC Monday per Cards. Diurese and monitor strict I&Os. CT chest showed concern for PNA vs edema but no signs of lesion. No leukocytosis, afebrile. Cont treating UTI.    9/25: Episode of chest pain this am. Trops negative. Cont IV diuresis with Lasix. Plan for R/LHC tomorrow. Cards following. Hgb stable and improved to 7.9 despite patient coughing up bloody sputum and as stated above likely from right heart failure and pulm edema.    9/26: Cont IV diuresis and once volume status improved plan for MPI stress test per Cards. Hgb improving. Procal negative but CRP elevated treating for PNA empirically. Cont holding Eliquis and send sputum to micro.     9/27: Cont IV diuresis and optimize for MPI stress test. Will add midodrine to augment BP because patient definitely needs IV diuresis. Cont abx. Hemoptysis slowly improving.     9/28: Will need an IVC filter because unable to tolerate PO AC. Scheduled to be placed today per IR. Hgb remains stable. CXR this am showed no significant change--per pulm possible Bronch? Cards following.     9/29: Initiated on low dose Eliquis per Pulm recs. IVC filter in place. Needs Lasix low dose per Cards with close follow up. Discharge on PO Levaquinx5 days.  Pulm and cards referrals sent. If stable d/c in the am     9/30 Patient given Iv venofer for low iron and dose of sq Vit B12. Patient's overall condition remained stable and improved and she was discharged to home once Life vest obtained.      Goals of Care Treatment Preferences:  Code Status: Full Code      Consults:   Consults (From admission, onward)        Status Ordering Provider     Inpatient consult to Registered Dietitian/Nutritionist           Acknowledged MEAGHAN QUINTANA     Inpatient consult to Interventional Radiology  Once        Provider:  Clarence Alicea MD    Completed CLARA GASPAR     Inpatient consult to  Pulmonology            Completed CHERIE SOLANO     Inpatient consult to Cardiology  Once        Provider:  Stephen Caro MD    Completed CAITY MONTANA     Inpatient consult to Social Work/Case Management             Completed CAITY MONTANA          Final Active Diagnoses:    Diagnosis Date Noted POA    Low iron [E61.1] 09/30/2022 Yes    Low folic acid [E53.8] 09/30/2022 Yes    Hypomagnesemia [E83.42] 09/30/2022 Yes    Hyponatremia [E87.1] 09/30/2022 Yes    Mild malnutrition [E44.1] 09/30/2022 Yes    Elevated bilirubin [R17] 09/30/2022 Yes    Tobacco use [Z72.0] 09/30/2022 Yes    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 [I74.3] 09/25/2022 No    Urinary tract infection with hematuria [N39.0, R31.9] 09/24/2022 Yes    Acute on chronic systolic congestive heart failure [I50.23] 09/23/2022 Yes    Cocaine abuse [F14.10] 09/23/2022 Yes    Non compliance w medication regimen [Z91.14] 09/23/2022 Not Applicable    Nicotine dependence [F17.200] 09/23/2022 Yes    Normocytic anemia [D64.9] 09/23/2022 Yes      Problems Resolved During this Admission:    Diagnosis Date Noted Date Resolved POA    PRINCIPAL PROBLEM:  Hemoptysis [R04.2] 09/23/2022 09/30/2022 Yes    Elevated d-dimer- Pulmonary emboli excluded [R79.89] 09/30/2022 09/30/2022 Yes    Chronic deep vein thrombosis (DVT) of right lower extremity [I82.501] 09/23/2022 09/25/2022 Yes    SOB (shortness of breath) [R06.02] 09/23/2022 09/30/2022 Yes       Discharged Condition: stable    Disposition: Home or Self Care    Follow Up:   Follow-up Information     Juan Contreras MD Follow up on 10/7/2022.    Specialty: Family Medicine  Why: For post hospital visit stay check up 10:00AM  Contact information:  [address] [place] [id number] [phone]             cardiology Follow up in 1 week(s).           pulmonology Follow up in 2 week(s).                     Patient Instructions:      Ambulatory referral/consult to Cardiology   Standing  Status: Future   Referral Priority: Routine Referral Type: Consultation   Referral Reason: Specialty Services Required   Requested Specialty: Cardiology   Number of Visits Requested: 1     Ambulatory referral/consult to [facility]   Standing Status: Future   Referral Priority: Routine Referral Type: Consultation   Referral Reason: Specialty Services Required   Number of Visits Requested: 1     Ambulatory referral/consult to Pulmonology   Standing Status: Future   Referral Priority: Routine Referral Type: Consultation   Referral Reason: Specialty Services Required   Requested Specialty: Pulmonary Disease   Number of Visits Requested: 1     Diet Cardiac     Notify your health care provider if you experience any of the following:  temperature >100.4     Notify your health care provider if you experience any of the following:  persistent nausea and vomiting or diarrhea     Notify your health care provider if you experience any of the following:  severe uncontrolled pain     Activity as tolerated       Significant Diagnostic Studies:   CMP   Recent Labs   Lab 09/30/22  1302   *   K 4.2      CO2 18*      BUN 23*   CREATININE 1.2   CALCIUM 8.8   ANIONGAP 14   , CBC   Recent Labs   Lab 09/29/22  0458 09/30/22  0531   WBC 4.85 5.56   HGB 7.6* 7.2*   HCT 27.0* 26.6*    223   , INR   Lab Results   Component Value Date    INR 1.4 (H) 09/23/2022    Troponin   Recent Labs   Lab 09/25/22  0812 09/25/22  1217 09/25/22  1721   TROPONINI 0.020 0.021 0.016    and A1C:   Recent Labs   Lab 06/04/22  0058 09/23/22  0950   HGBA1C 4.8 5.0         Procedure Component Value Units Date/Time   IR IVC Filter Placement [[id number]] Resulted: 09/29/22 1051   Order Status: Completed Updated: 09/29/22 1053   Narrative:     EXAMINATION:   Inferior vena cava (IVC) filter insertion     Procedural Personnel     Attending physician(s): Clarence Alicea MD, MD     Resident physician(s): None     Pre-procedure  diagnosis: History of DVT with contraindication anticoagulation at this time.     Post-procedure diagnosis: Same     Complications: No immediate complications     CLINICAL HISTORY:   Indication: As above.     TECHNIQUE:   - Real-time ultrasound-guided access of the right common femoral vein     - Fluoroscopic-guided placement of a retrievable infrarenal IVC filter     COMPARISON:   None     FINDINGS:   Pre-procedure     Consent: Informed consent for the procedure was obtained and time-out was performed prior to the procedure.     Preparation: The site was prepared and draped using maximal sterile barrier technique including cutaneous antisepsis.     Anesthesia/sedation     Level of anesthesia/sedation: Moderate sedation (conscious sedation)     Anesthesia/sedation administered by: Nurse or other independent trained observer     Total intra-service sedation time (minutes): 20 minutes     Access     Local anesthesia was administered. The vessel was sonographically evaluated and determined to be patent. Real-time ultrasound was used to visualize needle entry into the vessel and a permanent image was stored to the patient's electronic medical record.     Vein accessed: Right common femoral vein     Access technique: Micropuncture set with 21-gauge needle     Venography     Venography of the inferior vena cava (IVC) was performed to evaluate IVC size, anatomy and patency.     Catheter tip position for venography: Left common iliac vein     Venography findings: Patent IVC with normal diameter and adequate distance from the renal veins cranially in the iliac veins caudally for safe IVC filter placement.     Filter placement     The filter delivery sheath was advanced and the filter was deployed under fluoroscopic guidance.     Filter placed: Argon Option Elite, retrievable     Unique Device Identifier (CEDRIC): Not available     Post-placement imaging     Post-placement imaging technique: IVC venography     Filter position:  Infrarenal IVC     Closure     The sheath was removed and hemostasis was achieved with manual compression. A sterile dressing was applied.     Contrast     Contrast agent: Omnipaque 300     Contrast volume (mL): 50     Radiation Dose     Fluoroscopy time (minutes): 4.9     Two DSA runs were performed.     Additional Details     Additional description of procedure: None     Equipment details: None     Specimens removed: None     Estimated blood loss (mL): None     Standardized report: [email or web address]     Attestation     Signer name: Clarence Alicea MD, MD     I attest that I was present for the entire procedure. I reviewed the stored images and agree with the report as written.    Impression:       1. Successful placement of an Argon Option Elite retrievable infrarenal IVC filter.   Plan:     1. Retrieval intent (MIPS): The filter is potentially retrievable.  Please contact IR to schedule IVC filter removal when/if clinically indicated.       Electronically signed by: Clarence Alicea MD   Date: 09/29/2022   Time: 10:51   X-Ray Chest 1 View [[id number]] Resulted: 09/27/22 1835   Order Status: Completed Updated: 09/27/22 1838   Narrative:     EXAMINATION:   XR CHEST 1 VIEW     CLINICAL HISTORY:   follow up;     COMPARISON:   September 26, 2022     FINDINGS:   EKG leads overlie the chest which is lordotic in position and rotated to the right.  Stable left retrocardiac infiltrate.  The lungs are free of new pulmonary opacities.  The hilar and mediastinal contours and osseous structures are unchanged.    Impression:       1.  Overall, no significant interval change has occurred.       Electronically signed by: Alexander Murrieta MD   Date: 09/27/2022   Time: 18:35   X-Ray Chest AP Portable [[id number]] Resulted: 09/26/22 0725   Order Status: Completed Updated: 09/26/22 0727   Narrative:     EXAMINATION:   XR CHEST AP PORTABLE     CLINICAL HISTORY:   CHF;     FINDINGS:   Single view of the chest.  Comparison 09/23/2022.      Cardiac silhouette is enlarged but stable.  Patchy infiltrate left lower lobe concerning for airspace disease or aspiration.  Trace left pleural effusion suspected.  No pneumothorax.  Bones appear intact.    Impression:       See above.       Electronically signed by: Clarence Alicea MD   Date: 09/26/2022   Time: 07:25   US Lower Extremity Arteries Right [[id number]] Resulted: 09/25/22 1725   Order Status: Completed Updated: 09/25/22 1728   Narrative:     EXAMINATION:   US LOWER EXTREMITY ARTERIES RIGHT     CLINICAL HISTORY:   History of RLE arterial embolism;     TECHNIQUE:   Bilateral lower extremity arterial duplex ultrasound examination performed. Multiple gray scale and color doppler images were obtained in addition to waveform analysis.     COMPARISON:   No dedicated priors.     FINDINGS:   The peak systolic velocities on the right are as follows, in centimeters/second:     Common femoral artery: 32, triphasic     Deep femoral artery: 28, triphasic     Superficial femoral artery, proximal: 40, triphasic     Superficial femoral artery, mid portion: 62, triphasic     Superficial femoral artery, distal: 30, triphasic     Popliteal artery: 22-30, triphasic     Posterior tibial artery: 42, triphasic     Anterior tibial artery: 16, triphasic.     Peroneal artery: Thirty-three, triphasic     Spectral broadening throughout but without tardus parvus waveforms.    Impression:       Low velocities throughout, a nonspecific finding, correlation with cardiac output.  Further evaluation for upstream stenosis as warranted, however the waveforms appear fairly well preserved.     No arterial occlusion or hemodynamically significant stenosis.       Electronically signed by: George Carrillo   Date: 09/25/2022   Time: 17:25   RADIOLOGY REPORT [[id number]] Resulted: 09/22/22 0000   Order Status: Completed Updated: 09/24/22 1409       Procedure Component Value Units Date/Time   Culture, MRSA [[id number]]    Order Status: No result  Specimen: MRSA source from Nares, Left    Culture, Respiratory with Gram Stain [[id number]] Collected: 09/25/22 2137   Order Status: Completed Specimen: Sputum, Expectorated Updated: 09/28/22 1017    Respiratory Culture Normal respiratory rubio     No S aureus or Pseudomonas isolated.    Gram Stain (Respiratory) <10 epithelial cells per low power field.    Gram Stain (Respiratory) Rare WBC's    Gram Stain (Respiratory) Few Gram positive cocci    Gram Stain (Respiratory) Rare Gram negative rods   Blood culture [[id number]] Collected: 09/25/22 1722   Order Status: Completed Specimen: Blood Updated: 09/30/22 1000    Blood Culture, Routine Gram stain marichuy bottle: Gram positive rods     Results called to and read back by: Toribio Estevez RN  09/27/2022  23:09   Blood culture [[id number]] Collected: 09/25/22 1721   Order Status: Completed Specimen: Blood Updated: 09/30/22 0612    Blood Culture, Routine No Growth to date     No Growth to date     No Growth to date     No Growth to date     No Growth to date   Culture, MRSA [[id number]]    Order Status: No result Specimen: MRSA source from Nares, Right    Urine culture [[id number]] (Abnormal) Collected: 09/23/22 0053   Order Status: Completed Specimen: Urine Updated: 09/25/22 1349    Urine Culture, Routine COAGULASE-NEGATIVE STAPHYLOCOCCUS SPECIES   10,000 - 49,999 cfu/ml   Susceptibility testing not routinely performed.    Abnormal    Narrative:     Specimen Source->Urine           Reason for Exam  Priority: Routine  Dx: CHF (congestive heart failure) [I50.9 (ICD-10-CM)]     View Images Vital Vitrea     Show images for Echo  Summary     The left ventricle is severely enlarged with concentric hypertrophy and severely decreased systolic function.   The estimated ejection fraction is 15%.   Severe left atrial enlargement.   Severe right atrial enlargement.   Grade III left ventricular diastolic dysfunction.   The estimated PA systolic pressure is 106 mmHg.   Normal right  "ventricular size with mildly reduced right ventricular systolic function.   There is pulmonary hypertension.   Elevated central venous pressure (15 mmHg).   Moderate to severe tricuspid regurgitation.   There is severe left ventricular global hypokinesis.   Moderate pulmonic regurgitation.   Moderate mitral regurgitation.   There is a small right pleural effusion.     Vitals    Height Weight BMI (Calculated) BSA (Calculated - sq m) BP Pulse   5' 4" (1.626 m) 56.7 kg (125 lb) 21.4 1.6 sq meters 105/80 106     Study Details    A limited echo was performed using limited 2D, color flow Doppler and limited spectral Doppler. During the study, the apical, parasternal and subcostal views were captured. This was a portable study performed at the patient's bedside.     Echocardiography Findings    Left Ventricle    The left ventricle is severely enlarged with severely decreased systolic function. The estimated ejection fraction is 15%. There is grade III diastolic dysfunction consistent with restrictive physiology. Left atrial pressure is elevated. There is severe global hypokinesis.     Right Ventricle    Normal cavity size. Systolic function is mildly reduced.     Left Atrium    There is severe left atrial enlargement. Left atrial volume index is 72.8 mL/m2.     Right Atrium    The right atrium is severely enlarged.     Aortic Valve    The aortic valve appears structurally normal. The valve is trileaflet. There is normal leaflet mobility. No regurgitation.     Mitral Valve    The mitral valve appears structurally normal. There is normal leaflet mobility.  There is moderate mitral valve regurgitation. There is no stenosis. The estimated mitral valve area by pressure half time is 4.37 cm2.     Tricuspid Valve    The tricuspid valve appears structurally normal. There is normal leaflet mobility. There is moderate to severe regurgitation. There is pulmonary hypertension. The estimated PA systolic pressure is greater than " 91 mmHg.     Pulmonic Valve    Pulmonic valve is structurally normal. There is normal leaflet mobility. Moderate regurgitation.     IVC/SVC    Elevated central venous pressure (15 mm Hg).     Ascending Aorta    The aortic root and ascending aorta are normal in size.     Pericardium and Other Findings    There is no pericardial effusion. There is a small right-sided pleural effusion.     Exam Details    Performed Procedure Technologist     Transthoracic echo (TTE) limited Jyoti Dugan RDCS           Begin Exam End Exam     9/23/2022  7:17 AM 9/23/2022  7:21 AM              2D Measurements    Dimensions   LVIDd 6.41 cm Important          LVIDs 6.05 cm Important          IVS 1.34 cm Important          Posterior Wall 1.43 cm Important          FS 6 %         LA size 4.46 cm         LA volume 116.45 cm3         LA Volume Index 72.8 mL/m2         LV mass 425.12 g          Dimensions   TAPSE 1.1 cm         RA Width 5.22 cm         RA Major Axis 6.43 cm          Aortic Root - End Diastolic   Sinus 2.66 cm         STJ 2.47 cm         Ascending aorta 2.54 cm          Inferior Vena Cava   IVC diameter 2.77 cm               Doppler Measurements - Diastolic Filling    Diastolic Filling   E/A ratio 3.52          IVRT 51.38 msec         Mean e' 0.06 m/s          E wave deceleration time 173.52 msec         E/E' ratio 18.55 m/s              Doppler Measurements - Aortic Valve    Stenosis   LVOT diameter 1.95 cm         LVOT area 3 cm2         LVOT peak marcus 0.32 m/s         LVOT peak VTI 3.6 cm         Ao peak marcus 0.78 m/s         Ao VTI 10.2 cm         AV valve area 1.05 cm2         AV mean gradient 1 mmHg         AV peak gradient 2 mmHg         AV Velocity Ratio 0.41          AV index (prosthetic) 0.35                  Doppler Measurements - Mitral Valve    Stenosis   MV valve area p 1/2 method 4.37 cm2          PISA-MS   MV Peak E Marcus 1.02 m/s          PISA-MR   Mr max marcus 4.8 m/s                Doppler Measurements - Shunt  Ratio    Shunt Ratio   LVOT stroke volume 10.75 cm3              Doppler Measurements - Tricuspid Valve    Stress Evaluation   TV rest pulmonary artery pressure 106 mmHg                 Medications  (Filter: Administrations occurring from 0000 to 0722 on 09/23/22)  None     Signed    Electronically signed by Eli Villagomez MD on 9/23/22 at 1205 CDT         Pending Diagnostic Studies:     None         Medications:  Reconciled Home Medications:      Medication List      START taking these medications    acetaminophen 325 MG tablet  Commonly known as: TYLENOL  Take 2 tablets (650 mg total) by mouth every 6 (six) hours as needed for Pain.     ascorbic acid (vitamin C) 500 MG tablet  Commonly known as: VITAMIN C  Take 1 tablet (500 mg total) by mouth every evening.     folic acid 1 MG tablet  Commonly known as: FOLVITE  Take 1 tablet (1 mg total) by mouth once daily.  Start taking on: October 1, 2022     levoFLOXacin 750 MG tablet  Commonly known as: LEVAQUIN  Take 1 tablet (750 mg total) by mouth once daily. for 5 days     multivitamin Tab  Take 1 tablet by mouth once daily.  Start taking on: October 1, 2022        CHANGE how you take these medications    ELIQUIS 2.5 mg Tab  Generic drug: apixaban  Take 1 tablet (2.5 mg total) by mouth 2 (two) times daily.  What changed:   · medication strength  · how much to take  · when to take this     ferrous sulfate 324 mg (65 mg iron) Tbec  Take 1 tablet (324 mg total) by mouth 2 (two) times daily with meals.  What changed:   · how much to take  · when to take this        CONTINUE taking these medications    atorvastatin 40 MG tablet  Commonly known as: LIPITOR  Take 40 mg by mouth once daily.     benzonatate 200 MG capsule  Commonly known as: TESSALON  Take by mouth.     furosemide 40 MG tablet  Commonly known as: LASIX  Take 40 mg by mouth once daily.     JARDIANCE 10 mg tablet  Generic drug: empagliflozin  Take 10 mg by mouth once daily.     potassium chloride SA 10 MEQ  tablet  Commonly known as: K-DUR,KLOR-CON M  Take 1 tablet by mouth once daily.     QUEtiapine 100 MG Tab  Commonly known as: SEROQUEL  Take 1 tablet (100 mg total) by mouth every evening.        STOP taking these medications    aspirin 81 MG EC tablet  Commonly known as: ECOTRIN     carvediloL 6.25 MG tablet  Commonly known as: COREG     metoprolol succinate 25 MG 24 hr tablet  Commonly known as: TOPROL-XL            Indwelling Lines/Drains at time of discharge:   Lines/Drains/Airways     None                 Time spent on the discharge of patient: 74 minutes         YULISA Fitch  Department of Hospital Medicine  [facility]

## 2022-09-30 NOTE — SUBJECTIVE & OBJECTIVE
Review of Systems   Constitutional: Positive for malaise/fatigue.   HENT: Negative.     Eyes: Negative.    Cardiovascular:  Positive for dyspnea on exertion.   Respiratory:  Positive for hemoptysis and shortness of breath.    Endocrine: Negative.    Hematologic/Lymphatic: Negative.    Skin: Negative.    Musculoskeletal: Negative.    Gastrointestinal:  Positive for bloating.   Genitourinary: Negative.    Neurological: Negative.    Psychiatric/Behavioral: Negative.     Allergic/Immunologic: Negative.    Objective:     Vital Signs (Most Recent):  Temp: 98 °F (36.7 °C) (09/30/22 0710)  Pulse: 98 (09/30/22 0900)  Resp: 18 (09/30/22 0710)  BP: 91/64 (09/30/22 0710)  SpO2: 98 % (09/30/22 0710) Vital Signs (24h Range):  Temp:  [97.5 °F (36.4 °C)-98.6 °F (37 °C)] 98 °F (36.7 °C)  Pulse:  [] 98  Resp:  [16-20] 18  SpO2:  [95 %-100 %] 98 %  BP: ()/(53-70) 91/64     Weight: 52.3 kg (115 lb 4.8 oz)  Body mass index is 19.79 kg/m².     SpO2: 98 %  O2 Device (Oxygen Therapy): nasal cannula      Intake/Output Summary (Last 24 hours) at 9/30/2022 1203  Last data filed at 9/29/2022 1923  Gross per 24 hour   Intake 49.84 ml   Output --   Net 49.84 ml       Lines/Drains/Airways       Peripheral Intravenous Line  Duration                  Peripheral IV - Single Lumen 09/26/22 0652 22 G Left;Posterior Hand 4 days                    Physical Exam  Vitals and nursing note reviewed.   Constitutional:       General: She is not in acute distress.     Appearance: Normal appearance. She is well-developed. She is not diaphoretic.   HENT:      Head: Normocephalic and atraumatic.   Eyes:      General:         Right eye: No discharge.         Left eye: No discharge.      Pupils: Pupils are equal, round, and reactive to light.   Neck:      Thyroid: No thyromegaly.      Vascular: No JVD.      Trachea: No tracheal deviation.   Cardiovascular:      Rate and Rhythm: Normal rate and regular rhythm.      Heart sounds: Normal heart sounds,  S1 normal and S2 normal. No murmur heard.  Pulmonary:      Effort: Pulmonary effort is normal. No respiratory distress.      Breath sounds: Normal breath sounds. No wheezing or rales.   Abdominal:      General: There is distension.      Tenderness: There is no abdominal tenderness. There is no rebound.   Musculoskeletal:      Cervical back: Neck supple.      Right lower leg: No edema.      Left lower leg: No edema.   Skin:     General: Skin is warm and dry.      Findings: No erythema.   Neurological:      General: No focal deficit present.      Mental Status: She is alert and oriented to person, place, and time.   Psychiatric:         Mood and Affect: Mood normal.         Behavior: Behavior normal.         Thought Content: Thought content normal.       Significant Labs: CMP No results for input(s): NA, K, CL, CO2, GLU, BUN, CREATININE, CALCIUM, PROT, ALBUMIN, BILITOT, ALKPHOS, AST, ALT, ANIONGAP, ESTGFRAFRICA, EGFRNONAA in the last 48 hours., CBC   Recent Labs   Lab 09/29/22  0458 09/30/22  0531   WBC 4.85 5.56   HGB 7.6* 7.2*   HCT 27.0* 26.6*    223   , Troponin No results for input(s): TROPONINI in the last 48 hours., and All pertinent lab results from the last 24 hours have been reviewed.    Significant Imaging: Echocardiogram: Transthoracic echo (TTE) complete (Cupid Only):   Results for orders placed or performed during the hospital encounter of 09/22/22   Echo   Result Value Ref Range    BSA 1.6 m2    TDI SEPTAL 0.04 m/s    LV LATERAL E/E' RATIO 14.57 m/s    LV SEPTAL E/E' RATIO 25.50 m/s    LA WIDTH 4.80 cm    IVC diameter 2.77 cm    Left Ventricular Outflow Tract Mean Velocity 0.24 cm/s    Left Ventricular Outflow Tract Mean Gradient 0.25 mmHg    TDI LATERAL 0.07 m/s    LVIDd 6.41 (A) 3.5 - 6.0 cm    IVS 1.34 (A) 0.6 - 1.1 cm    Posterior Wall 1.43 (A) 0.6 - 1.1 cm    LVIDs 6.05 (A) 2.1 - 4.0 cm    FS 6 28 - 44 %    LA volume 116.45 cm3    Sinus 2.66 cm    STJ 2.47 cm    Ascending aorta 2.54 cm    LV  mass 425.12 g    LA size 4.46 cm    TAPSE 1.10 cm    Left Ventricle Relative Wall Thickness 0.45 cm    AV mean gradient 1 mmHg    AV valve area 1.05 cm2    AV Velocity Ratio 0.41     AV index (prosthetic) 0.35     MV valve area p 1/2 method 4.37 cm2    E/A ratio 3.52     Mean e' 0.06 m/s    E wave deceleration time 173.52 msec    IVRT 51.38 msec    LVOT diameter 1.95 cm    LVOT area 3.0 cm2    LVOT peak marcus 0.32 m/s    LVOT peak VTI 3.60 cm    Ao peak marcus 0.78 m/s    Ao VTI 10.2 cm    Mr max marcus 4.80 m/s    LVOT stroke volume 10.75 cm3    AV peak gradient 2 mmHg    E/E' ratio 18.55 m/s    MV Peak E Marcus 1.02 m/s    TR Max Marcus 4.77 m/s    MV stenosis pressure 1/2 time 50.32 ms    MV Peak A Marcus 0.29 m/s    LV Systolic Volume 183.41 mL    LV Systolic Volume Index 114.6 mL/m2    LV Diastolic Volume 209.62 mL    LV Diastolic Volume Index 131.01 mL/m2    LA Volume Index 72.8 mL/m2    LV Mass Index 266 g/m2    RA Major Axis 6.43 cm    Left Atrium Minor Axis 6.44 cm    Left Atrium Major Axis 6.36 cm    Triscuspid Valve Regurgitation Peak Gradient 91 mmHg    RA Width 5.22 cm    Right Atrial Pressure (from IVC) 15 mmHg    EF 15 %    TV rest pulmonary artery pressure 106 mmHg    Narrative    · The left ventricle is severely enlarged with concentric hypertrophy and   severely decreased systolic function.  · The estimated ejection fraction is 15%.  · Severe left atrial enlargement.  · Severe right atrial enlargement.  · Grade III left ventricular diastolic dysfunction.  · The estimated PA systolic pressure is 106 mmHg.  · Normal right ventricular size with mildly reduced right ventricular   systolic function.  · There is pulmonary hypertension.  · Elevated central venous pressure (15 mmHg).  · Moderate to severe tricuspid regurgitation.  · There is severe left ventricular global hypokinesis.  · Moderate pulmonic regurgitation.  · Moderate mitral regurgitation.  · There is a small right pleural effusion.      , EKG: Reviewed, and  X-Ray: CXR: X-Ray Chest 1 View (CXR):   Results for orders placed or performed during the hospital encounter of 09/22/22   X-Ray Chest 1 View    Narrative    EXAMINATION:  XR CHEST 1 VIEW    CLINICAL HISTORY:  follow up;    COMPARISON:  September 26, 2022    FINDINGS:  EKG leads overlie the chest which is lordotic in position and rotated to the right.  Stable left retrocardiac infiltrate.  The lungs are free of new pulmonary opacities.  The hilar and mediastinal contours and osseous structures are unchanged.      Impression    1.  Overall, no significant interval change has occurred.      Electronically signed by: Alexander Murrieta MD  Date:    09/27/2022  Time:    18:35    and X-Ray Chest PA and Lateral (CXR): No results found for this visit on 09/22/22.

## 2022-09-30 NOTE — ASSESSMENT & PLAN NOTE
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 Scheduled to be placed today per IR. Hgb remains stable. CXR this am showed no significant change--per pulm possible Bronch?     9/30 S/p IVC filter placement on 9/28, also on Apixaban

## 2022-09-30 NOTE — ASSESSMENT & PLAN NOTE
-Patient with known CHF, EF 25% by last echo in Care Everywhere  -Grossly overloaded on exam  -Continue IV diuresis  -Resume BB if UDS negative  -Will attempt to add ARB vs Entresto pending BP trend  -Repeat Echo pending  -Patient with history of medication non-compliance  -Counseled on dietary and fluid restrictions    9/24/22  ED is negative for cocaine, metoprolol started  Repeat echo with EF 15%, moderate to severe TR, RVSP 106 mmhg-> consistent with volume overload   Continue IV diuresis   Monitor urine output  Blood pressure labile  Will need ischemia evaluation with R/LHC during this admission     9/25/22  Beta-blocker held due to hypotension  Lactate 2.2, higher limit of normal   Will try metolazone 2.5 mg and assess output   Will consider starting dobutamine if still decrease urine output  R/LHC possibly tomorrow (tentative), pending patient's volume status  Keep NPO at midnight    9/26/22  -BNP still elevated  -Continue IV diuresis   -Limited with OMT given borderline BP  -Ischemic evaluation with MPI stress test once hemoptysis/volume status improved    9/27/22  -Continue IV diuresis  -Will optimize regimen as tolerated  -Ischemic evaluation once stable    09/28  Add Lasix on d/c order  No ACEI and BB due to low BP  F/u as OP    9/30/22  -Labs pending, continue IV diuresis for now  -Needs po Lasix upon d/c  -Limited with med optimization given hypotension  -LifeVest ordered for SCD prevention

## 2022-09-30 NOTE — ASSESSMENT & PLAN NOTE
Iron studies c/w anemia  Does not warrant transfusion currently    9/26:  hgb now 8.0    9/27:  Stable  9/30 HGB 7.2- Add MVI  Recent iron and B levels noted- Add dose sq vit B 12  Add po iron and pm vit C after Iv Venofer doses completed

## 2022-09-30 NOTE — PROGRESS NOTES
"[facility]  Cardiology  Progress Note    Patient Name: Teresa Echols  MRN: [id number]  Admission Date: 9/22/2022  Hospital Length of Stay: 5 days  Code Status: Full Code   Attending Physician: Simeon Maldonado MD   Primary Care Physician: Juan Contreras MD  Expected Discharge Date: 9/30/2022  Principal Problem:Hemoptysis    Subjective:     Hospital Course:   Ms. Echols is a 37 year old female patient whose current medical conditions include chronic systolic CHF (25% per echo 6/22 in Care Everywhere), history of cocaine abuse, RLE DVT s/p thrombectomy in 6/22, CHALINO, and non-compliance who presented to [facility] ED yesterday with a chief complaint of worsening SOB over the past few days. Associated symptoms included hemoptysis, BLE edema, abdominal bloating, and orthopnea. She denied any associated fever, chills, palpitations, near syncope, or syncope. Initial workup in ED revealed anemia (H/H 7.3/25.8), D-dimer +, and BNP of 3610. CTA negative for PE but did show findings consistent with vascular congestion and right heart strain and patient was subsequently admitted for further evaluation and treatment. Cardiology consulted to assist with management. Patient seen and examined today, resting in bed. Remains SOB. Denies osvaldo chest pain but does admit to some left upper chest "tingling". States she has been having issues with SOB/fluid overload ever since her thrombectomy/hospital admission in June. She claims she has been compliant with her medications, but has been using Lasix prn and taking Eliquis only once daily. Admits to eating 2 bags of potato chips prior to admission. Chart reviewed. Initial troponin negative. Repeat echo pending.      9/24/22- doing well.  Abdomen still.  Reports good urine output.  Lasix has been held.  Patient hypotensive.  Blood thinner stopped due to hemoptysis.  Repeat echo with EF 15%, mod-severe TR, RVSP 106 mmhg    9/25/22- continues to have abdominal distension.  Continues to " have hemoptysis, stable Hgb.  Was not started on OAC during admission.  Lactate 2.2, upper limit of normal.  C/o left sided pain. K borderline. BB stopped due to hypotension. Does not feel it she urinating a lot.    9/26/22-Patient seen and examined today, sitting up in bed. Feels ok. SOB slowly improving. Still with abdominal bloating as well as hemoptysis. Pulmonary on board. Labs reviewed. H/H stable. BNP remains elevated.     9/27/22-Patient seen and examined today, resting in bed. SOB and hemoptysis slowly improving. CXR yesterday concerning for infiltrate, remains on abx. Pulmonary following. Labs reviewed. May need IVC filter.    9/28/22-Patient seen and examined today, sitting up in bed. Still with hemoptysis, although reports improvement. Diuresed well overnight with addition of midodrine. IVC filter planned today. Labs relatively stable.    09/29  IVC filter placed and resumed elquis 2.5 mg bid per pulm rec.     9/30/22-Patient seen and examined today, sitting up in bed. Feels ok. States she feels winded at times and complains of abdominal bloating. Still with hemoptysis. Labs reviewed. H/H 7.2/26.6. BMP, Mg pending. LifeVest ordered for SCD prevention.          Review of Systems   Constitutional: Positive for malaise/fatigue.   HENT: Negative.     Eyes: Negative.    Cardiovascular:  Positive for dyspnea on exertion.   Respiratory:  Positive for hemoptysis and shortness of breath.    Endocrine: Negative.    Hematologic/Lymphatic: Negative.    Skin: Negative.    Musculoskeletal: Negative.    Gastrointestinal:  Positive for bloating.   Genitourinary: Negative.    Neurological: Negative.    Psychiatric/Behavioral: Negative.     Allergic/Immunologic: Negative.    Objective:     Vital Signs (Most Recent):  Temp: 98 °F (36.7 °C) (09/30/22 0710)  Pulse: 98 (09/30/22 0900)  Resp: 18 (09/30/22 0710)  BP: 91/64 (09/30/22 0710)  SpO2: 98 % (09/30/22 0710) Vital Signs (24h Range):  Temp:  [97.5 °F (36.4 °C)-98.6 °F  (37 °C)] 98 °F (36.7 °C)  Pulse:  [] 98  Resp:  [16-20] 18  SpO2:  [95 %-100 %] 98 %  BP: ()/(53-70) 91/64     Weight: 52.3 kg (115 lb 4.8 oz)  Body mass index is 19.79 kg/m².     SpO2: 98 %  O2 Device (Oxygen Therapy): nasal cannula      Intake/Output Summary (Last 24 hours) at 9/30/2022 1203  Last data filed at 9/29/2022 1923  Gross per 24 hour   Intake 49.84 ml   Output --   Net 49.84 ml       Lines/Drains/Airways       Peripheral Intravenous Line  Duration                  Peripheral IV - Single Lumen 09/26/22 0652 22 G Left;Posterior Hand 4 days                    Physical Exam  Vitals and nursing note reviewed.   Constitutional:       General: She is not in acute distress.     Appearance: Normal appearance. She is well-developed. She is not diaphoretic.   HENT:      Head: Normocephalic and atraumatic.   Eyes:      General:         Right eye: No discharge.         Left eye: No discharge.      Pupils: Pupils are equal, round, and reactive to light.   Neck:      Thyroid: No thyromegaly.      Vascular: No JVD.      Trachea: No tracheal deviation.   Cardiovascular:      Rate and Rhythm: Normal rate and regular rhythm.      Heart sounds: Normal heart sounds, S1 normal and S2 normal. No murmur heard.  Pulmonary:      Effort: Pulmonary effort is normal. No respiratory distress.      Breath sounds: Normal breath sounds. No wheezing or rales.   Abdominal:      General: There is distension.      Tenderness: There is no abdominal tenderness. There is no rebound.   Musculoskeletal:      Cervical back: Neck supple.      Right lower leg: No edema.      Left lower leg: No edema.   Skin:     General: Skin is warm and dry.      Findings: No erythema.   Neurological:      General: No focal deficit present.      Mental Status: She is alert and oriented to person, place, and time.   Psychiatric:         Mood and Affect: Mood normal.         Behavior: Behavior normal.         Thought Content: Thought content normal.        Significant Labs: CMP No results for input(s): NA, K, CL, CO2, GLU, BUN, CREATININE, CALCIUM, PROT, ALBUMIN, BILITOT, ALKPHOS, AST, ALT, ANIONGAP, ESTGFRAFRICA, EGFRNONAA in the last 48 hours., CBC   Recent Labs   Lab 09/29/22  0458 09/30/22  0531   WBC 4.85 5.56   HGB 7.6* 7.2*   HCT 27.0* 26.6*    223   , Troponin No results for input(s): TROPONINI in the last 48 hours., and All pertinent lab results from the last 24 hours have been reviewed.    Significant Imaging: Echocardiogram: Transthoracic echo (TTE) complete (Cupid Only):   Results for orders placed or performed during the hospital encounter of 09/22/22   Echo   Result Value Ref Range    BSA 1.6 m2    TDI SEPTAL 0.04 m/s    LV LATERAL E/E' RATIO 14.57 m/s    LV SEPTAL E/E' RATIO 25.50 m/s    LA WIDTH 4.80 cm    IVC diameter 2.77 cm    Left Ventricular Outflow Tract Mean Velocity 0.24 cm/s    Left Ventricular Outflow Tract Mean Gradient 0.25 mmHg    TDI LATERAL 0.07 m/s    LVIDd 6.41 (A) 3.5 - 6.0 cm    IVS 1.34 (A) 0.6 - 1.1 cm    Posterior Wall 1.43 (A) 0.6 - 1.1 cm    LVIDs 6.05 (A) 2.1 - 4.0 cm    FS 6 28 - 44 %    LA volume 116.45 cm3    Sinus 2.66 cm    STJ 2.47 cm    Ascending aorta 2.54 cm    LV mass 425.12 g    LA size 4.46 cm    TAPSE 1.10 cm    Left Ventricle Relative Wall Thickness 0.45 cm    AV mean gradient 1 mmHg    AV valve area 1.05 cm2    AV Velocity Ratio 0.41     AV index (prosthetic) 0.35     MV valve area p 1/2 method 4.37 cm2    E/A ratio 3.52     Mean e' 0.06 m/s    E wave deceleration time 173.52 msec    IVRT 51.38 msec    LVOT diameter 1.95 cm    LVOT area 3.0 cm2    LVOT peak marcus 0.32 m/s    LVOT peak VTI 3.60 cm    Ao peak marcus 0.78 m/s    Ao VTI 10.2 cm    Mr max marcus 4.80 m/s    LVOT stroke volume 10.75 cm3    AV peak gradient 2 mmHg    E/E' ratio 18.55 m/s    MV Peak E Marcus 1.02 m/s    TR Max Marcus 4.77 m/s    MV stenosis pressure 1/2 time 50.32 ms    MV Peak A Marcus 0.29 m/s    LV Systolic Volume 183.41 mL    LV Systolic  Volume Index 114.6 mL/m2    LV Diastolic Volume 209.62 mL    LV Diastolic Volume Index 131.01 mL/m2    LA Volume Index 72.8 mL/m2    LV Mass Index 266 g/m2    RA Major Axis 6.43 cm    Left Atrium Minor Axis 6.44 cm    Left Atrium Major Axis 6.36 cm    Triscuspid Valve Regurgitation Peak Gradient 91 mmHg    RA Width 5.22 cm    Right Atrial Pressure (from IVC) 15 mmHg    EF 15 %    TV rest pulmonary artery pressure 106 mmHg    Narrative    · The left ventricle is severely enlarged with concentric hypertrophy and   severely decreased systolic function.  · The estimated ejection fraction is 15%.  · Severe left atrial enlargement.  · Severe right atrial enlargement.  · Grade III left ventricular diastolic dysfunction.  · The estimated PA systolic pressure is 106 mmHg.  · Normal right ventricular size with mildly reduced right ventricular   systolic function.  · There is pulmonary hypertension.  · Elevated central venous pressure (15 mmHg).  · Moderate to severe tricuspid regurgitation.  · There is severe left ventricular global hypokinesis.  · Moderate pulmonic regurgitation.  · Moderate mitral regurgitation.  · There is a small right pleural effusion.      , EKG: Reviewed, and X-Ray: CXR: X-Ray Chest 1 View (CXR):   Results for orders placed or performed during the hospital encounter of 09/22/22   X-Ray Chest 1 View    Narrative    EXAMINATION:  XR CHEST 1 VIEW    CLINICAL HISTORY:  follow up;    COMPARISON:  September 26, 2022    FINDINGS:  EKG leads overlie the chest which is lordotic in position and rotated to the right.  Stable left retrocardiac infiltrate.  The lungs are free of new pulmonary opacities.  The hilar and mediastinal contours and osseous structures are unchanged.      Impression    1.  Overall, no significant interval change has occurred.      Electronically signed by: Alexander Murrieta MD  Date:    09/27/2022  Time:    18:35    and X-Ray Chest PA and Lateral (CXR): No results found for this visit on  09/22/22.    Assessment and Plan:   Patient who presents with decompensated CHF/hempopytsis. S/P IVC filter placement. Still being diuresed, transition to po Lasix upon discharge. Limited with med optimization given hypotension. LifeVest ordered.     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  -AC on hold given hemoptysis  -IVC filter planned today    Normocytic anemia  -Hgb range 7-8  -Transfuse prn, as per hospital medicine      Nicotine dependence  -Denies recent usage    Non compliance w medication regimen  -Counseled on compliance    Cocaine abuse  -UDS negative  -Denies recent use    Acute on chronic systolic congestive heart failure  -Patient with known CHF, EF 25% by last echo in Care Everywhere  -Grossly overloaded on exam  -Continue IV diuresis  -Resume BB if UDS negative  -Will attempt to add ARB vs Entresto pending BP trend  -Repeat Echo pending  -Patient with history of medication non-compliance  -Counseled on dietary and fluid restrictions    9/24/22  ED is negative for cocaine, metoprolol started  Repeat echo with EF 15%, moderate to severe TR, RVSP 106 mmhg-> consistent with volume overload   Continue IV diuresis   Monitor urine output  Blood pressure labile  Will need ischemia evaluation with R/LHC during this admission     9/25/22  Beta-blocker held due to hypotension  Lactate 2.2, higher limit of normal   Will try metolazone 2.5 mg and assess output   Will consider starting dobutamine if still decrease urine output  R/LHC possibly tomorrow (tentative), pending patient's volume status  Keep NPO at midnight    9/26/22  -BNP still elevated  -Continue IV diuresis   -Limited with OMT given borderline BP  -Ischemic evaluation  with MPI stress test once hemoptysis/volume status improved    9/27/22  -Continue IV diuresis  -Will optimize regimen as tolerated  -Ischemic evaluation once stable    09/28  Add Lasix on d/c order  No ACEI and BB due to low BP  F/u as OP    9/30/22  -Labs pending, continue IV diuresis for now  -Needs po Lasix upon d/c  -Limited with med optimization given hypotension  -LifeVest ordered for SCD prevention        VTE Risk Mitigation (From admission, onward)         Ordered     apixaban tablet 2.5 mg  2 times daily         09/29/22 1211     Place sequential compression device  Until discontinued         09/23/22 0539     IP VTE HIGH RISK PATIENT  Once         09/23/22 0539                Zoe Kim PA-C  Cardiology  [facility]

## 2022-09-30 NOTE — SUBJECTIVE & OBJECTIVE
ROS  Objective:     Vital Signs (Most Recent):  Temp: 97.5 °F (36.4 °C) (09/29/22 1912)  Pulse: 98 (09/29/22 1912)  Resp: 16 (09/29/22 1912)  BP: 99/66 (09/29/22 1912)  SpO2: 99 % (09/29/22 1912) Vital Signs (24h Range):  Temp:  [97.4 °F (36.3 °C)-98.9 °F (37.2 °C)] 97.5 °F (36.4 °C)  Pulse:  [] 98  Resp:  [14-20] 16  SpO2:  [95 %-100 %] 99 %  BP: ()/(54-70) 99/66     Weight: 55.4 kg (122 lb 2.2 oz)  Body mass index is 20.96 kg/m².     SpO2: 99 %  O2 Device (Oxygen Therapy): nasal cannula      Intake/Output Summary (Last 24 hours) at 9/29/2022 2207  Last data filed at 9/29/2022 1923  Gross per 24 hour   Intake 49.84 ml   Output --   Net 49.84 ml       Lines/Drains/Airways       Peripheral Intravenous Line  Duration                  Peripheral IV - Single Lumen 09/26/22 0652 22 G Left;Posterior Hand 3 days                    Physical Exam  Vitals and nursing note reviewed.   Constitutional:       General: She is not in acute distress.     Appearance: Normal appearance. She is well-developed. She is not diaphoretic.   HENT:      Head: Normocephalic and atraumatic.   Eyes:      General:         Right eye: No discharge.         Left eye: No discharge.      Pupils: Pupils are equal, round, and reactive to light.   Neck:      Thyroid: No thyromegaly.      Vascular: No JVD.      Trachea: No tracheal deviation.   Cardiovascular:      Rate and Rhythm: Regular rhythm. Tachycardia present.      Heart sounds: Normal heart sounds, S1 normal and S2 normal. No murmur heard.  Pulmonary:      Effort: Pulmonary effort is normal. No respiratory distress.      Breath sounds: Normal breath sounds. No wheezing or rales.   Abdominal:      General: There is no distension.      Tenderness: There is no rebound.   Musculoskeletal:      Cervical back: Neck supple.      Right lower leg: No edema.      Left lower leg: No edema.   Skin:     General: Skin is warm and dry.      Findings: No erythema.   Neurological:      General: No  focal deficit present.      Mental Status: She is alert and oriented to person, place, and time.   Psychiatric:         Mood and Affect: Mood normal.         Behavior: Behavior normal.         Thought Content: Thought content normal.       Significant Labs: ABG: No results for input(s): PH, PCO2, HCO3, POCSATURATED, BE in the last 48 hours., Blood Culture: No results for input(s): LABBLOO in the last 48 hours., BMP: No results for input(s): GLU, NA, K, CL, CO2, BUN, CREATININE, CALCIUM, MG in the last 48 hours., CMP No results for input(s): NA, K, CL, CO2, GLU, BUN, CREATININE, CALCIUM, PROT, ALBUMIN, BILITOT, ALKPHOS, AST, ALT, ANIONGAP, ESTGFRAFRICA, EGFRNONAA in the last 48 hours., CBC   Recent Labs   Lab 09/28/22  0517 09/29/22  0458   WBC 5.79 4.85   HGB 7.4* 7.6*   HCT 25.1* 27.0*    209   , INR No results for input(s): INR, PROTIME in the last 48 hours., Lipid Panel No results for input(s): CHOL, HDL, LDLCALC, TRIG, CHOLHDL in the last 48 hours., and Troponin No results for input(s): TROPONINI in the last 48 hours.    Significant Imaging: EKG:

## 2022-10-01 LAB
BACTERIA BLD CULT: ABNORMAL
BACTERIA BLD CULT: NORMAL

## 2022-10-01 NOTE — NURSING
Pt discharged to transport to home. IV removed. Cardiac monitoring removed by previous nurse. AVS given and explained to pt.

## 2022-10-03 ENCOUNTER — TELEPHONE (OUTPATIENT)
Dept: CARDIOLOGY | Facility: CLINIC | Age: 37
End: 2022-10-03
Payer: MEDICAID

## 2022-10-03 NOTE — PHYSICIAN QUERY
PT Name: Teresa Echols  MR #: [id number]     DOCUMENTATION CLARIFICATION      CDS/: Jeimy Lopez RN            Contact information: [email or web address]   This form is a permanent document in the medical record.     Query Date: October 3, 2022    By submitting this query, we are merely seeking further clarification of documentation.  Please utilize your independent clinical judgment when addressing the question(s) below.     The Medical Record contains the following:    Clinical Information Location in Medical Record   Chronic deep vein thrombosis (DVT) of right lower extremity  -s/p thrombectomy few months ago at [facility]  -Has been on Xarelto  -hold due to hemoptysis    Since stopping Eliquis this hospitalization she has not had any blood-tinged  sputum production.    Blood thinner stopped due to hemoptysis.    Continues to cough up blood tinged sputum which is likely related to CHF and pulm edema.    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  Off anticoagulation due to hemoptysis.  Treat CHF.  IR for IVC filter    Will need an IVC filter because unable to tolerate PO AC. Scheduled to be placed today per IR.     Procedure:    IVC filter placement    Acute Illness:  Hemoptysis, arterial embolism and thrombosis of lower extremity, urinary tract infection with hematuria, acute on chronic systole congestive heart failure, cocaine abuse, non compliance with medication regimen, tobacco use, anemia, shortness of breath     Chronic Illness:  PMH significant for systolic CHF (EF 25% on echo done June 2022), chronic cocaine user, right lower extremity DVT status post thrombectomy on in June), presented to [facility] ED complaining of coughing up blood the past 2-3 months, since the thrombectomy procedure.     H & P of 9/23            Care Update of 9/23     Cardio PN of 9/24     HM PN of 9/24     Pulm PN of 9/28          PN of 9/28     Rad Procedure Note of 9/28       H & P of  9/23          H & P of 9/23      Please document your best medical opinion regarding the etiology of hemoptysis ?       [ x  ] Due to anticoagulant therapy      [   ] Due to CHF and pulm edema.     [   ] Other etiology (please specify):___________________     [  ] Clinically Undetermined             Please document in your progress notes daily for the duration of treatment, until resolved, and include in your discharge summary.

## 2022-10-03 NOTE — TELEPHONE ENCOUNTER
Called pt, busy signal.  Called Yee, mother listed as contact, no answer and unable to leave VM.  Will try again later.

## 2022-10-04 ENCOUNTER — PATIENT OUTREACH (OUTPATIENT)
Dept: ADMINISTRATIVE | Facility: CLINIC | Age: 37
End: 2022-10-04
Payer: MEDICAID

## 2022-10-04 NOTE — TELEPHONE ENCOUNTER
Called pt, busy signal.  Called and spoke to Yee, mother listed as contact.   Informed Yee of follow up appt for patient on 10/7/2022 at 10:30am with HENRRY Huff and requested to have pt return call to [facility] nurse to complete 48 hour hospital discharge call.   Yee informed would notify patient of above information.

## 2022-10-04 NOTE — PROGRESS NOTES
C3 nurse attempted to contact Teresa cEhols  for a TCC post hospital discharge follow up call. No answer. No voicemail available.  Writer was able to speak with patient's mother and leave call back information.  The patient has a scheduled [facility] appointment with Juan Contreras MD  on 10/7/2022 @ 1000.  The patient does not have an [facility] PCP.

## 2022-10-05 NOTE — PROGRESS NOTES
3rd attempt-C3 nurse attempted to contact Teresa Echols  for a TCC post hospital discharge follow up call. No answer. No voicemail available. The patient has a scheduled HOSFU appointment with Juan Contreras MD  on 10/7/2022 @ 1000.

## 2022-10-05 NOTE — PROGRESS NOTES
2nd attempt-C3 nurse attempted to contact Teresa Echols  for a TCC post hospital discharge follow up call. No answer. No voicemail available. The patient has a scheduled HOSFU appointment with Juan Contreras MD  on 10/7/2022 @ [facility].

## 2022-10-07 ENCOUNTER — OFFICE VISIT (OUTPATIENT)
Dept: CARDIOLOGY | Facility: CLINIC | Age: 37
End: 2022-10-07
Payer: MEDICAID

## 2022-10-07 VITALS
WEIGHT: 121.5 LBS | BODY MASS INDEX: 20.85 KG/M2 | DIASTOLIC BLOOD PRESSURE: 60 MMHG | OXYGEN SATURATION: 91 % | HEART RATE: 118 BPM | SYSTOLIC BLOOD PRESSURE: 90 MMHG

## 2022-10-07 DIAGNOSIS — I74.3 ARTERIAL EMBOLISM AND THROMBOSIS OF LOWER EXTREMITY: ICD-10-CM

## 2022-10-07 DIAGNOSIS — I50.23 ACUTE ON CHRONIC SYSTOLIC CONGESTIVE HEART FAILURE: Primary | ICD-10-CM

## 2022-10-07 PROCEDURE — 1159F MED LIST DOCD IN RCRD: CPT | Mod: CPTII,,, | Performed by: PHYSICIAN ASSISTANT

## 2022-10-07 PROCEDURE — 3044F PR MOST RECENT HEMOGLOBIN A1C LEVEL <7.0%: ICD-10-PCS | Mod: CPTII,,, | Performed by: PHYSICIAN ASSISTANT

## 2022-10-07 PROCEDURE — 99214 PR OFFICE/OUTPT VISIT, EST, LEVL IV, 30-39 MIN: ICD-10-PCS | Mod: S$PBB,,, | Performed by: PHYSICIAN ASSISTANT

## 2022-10-07 PROCEDURE — 3044F HG A1C LEVEL LT 7.0%: CPT | Mod: CPTII,,, | Performed by: PHYSICIAN ASSISTANT

## 2022-10-07 PROCEDURE — 99213 OFFICE O/P EST LOW 20 MIN: CPT | Mod: PBBFAC | Performed by: PHYSICIAN ASSISTANT

## 2022-10-07 PROCEDURE — 1111F DSCHRG MED/CURRENT MED MERGE: CPT | Mod: CPTII,,, | Performed by: PHYSICIAN ASSISTANT

## 2022-10-07 PROCEDURE — 3008F PR BODY MASS INDEX (BMI) DOCUMENTED: ICD-10-PCS | Mod: CPTII,,, | Performed by: PHYSICIAN ASSISTANT

## 2022-10-07 PROCEDURE — 3078F PR MOST RECENT DIASTOLIC BLOOD PRESSURE < 80 MM HG: ICD-10-PCS | Mod: CPTII,,, | Performed by: PHYSICIAN ASSISTANT

## 2022-10-07 PROCEDURE — 3078F DIAST BP <80 MM HG: CPT | Mod: CPTII,,, | Performed by: PHYSICIAN ASSISTANT

## 2022-10-07 PROCEDURE — 99999 PR PBB SHADOW E&M-EST. PATIENT-LVL III: ICD-10-PCS | Mod: PBBFAC,,, | Performed by: PHYSICIAN ASSISTANT

## 2022-10-07 PROCEDURE — 1111F PR DISCHARGE MEDS RECONCILED W/ CURRENT OUTPATIENT MED LIST: ICD-10-PCS | Mod: CPTII,,, | Performed by: PHYSICIAN ASSISTANT

## 2022-10-07 PROCEDURE — 3008F BODY MASS INDEX DOCD: CPT | Mod: CPTII,,, | Performed by: PHYSICIAN ASSISTANT

## 2022-10-07 PROCEDURE — 3074F PR MOST RECENT SYSTOLIC BLOOD PRESSURE < 130 MM HG: ICD-10-PCS | Mod: CPTII,,, | Performed by: PHYSICIAN ASSISTANT

## 2022-10-07 PROCEDURE — 99214 OFFICE O/P EST MOD 30 MIN: CPT | Mod: S$PBB,,, | Performed by: PHYSICIAN ASSISTANT

## 2022-10-07 PROCEDURE — 3074F SYST BP LT 130 MM HG: CPT | Mod: CPTII,,, | Performed by: PHYSICIAN ASSISTANT

## 2022-10-07 PROCEDURE — 99999 PR PBB SHADOW E&M-EST. PATIENT-LVL III: CPT | Mod: PBBFAC,,, | Performed by: PHYSICIAN ASSISTANT

## 2022-10-07 PROCEDURE — 1159F PR MEDICATION LIST DOCUMENTED IN MEDICAL RECORD: ICD-10-PCS | Mod: CPTII,,, | Performed by: PHYSICIAN ASSISTANT

## 2022-10-07 NOTE — PROGRESS NOTES
HF TCC Provider Note (Initial Clinic) Consult Note    Date of original referral: 9/30/2022  Age: 37 y.o.  Gender: female  Ethnicity: Not  or /a   Number of admissions for CHF within the preceding year: more than 3   Duration of CHF: June 2022  Type of Congestive Heart Failure: Systolic   Etiology: Non-ischemic    Social history: Illicit drugs cocaine, most recent UDS negative for all  Enrolled in Infusion suite: no    Diagnostic Labs:   EKG - 09/26/2022  CXR - 09/27/2022  ECHO - 09/23/2022  Stress test -   Stress echo -   Pharmacologic stress -   Cardiac catheterization - 09/29/2022   Cardiac MRI -     Lab Results   Component Value Date     (L) 09/30/2022     (L) 09/26/2022    K 4.2 09/30/2022    K 4.2 09/26/2022     09/30/2022     09/26/2022    CO2 18 (L) 09/30/2022    CO2 21 (L) 09/26/2022     09/30/2022    GLU 72 09/26/2022    BUN 23 (H) 09/30/2022    BUN 16 09/26/2022    CREATININE 1.2 09/30/2022    CREATININE 0.8 09/26/2022    CALCIUM 8.8 09/30/2022    CALCIUM 8.5 (L) 09/26/2022    PROT 5.8 (L) 09/26/2022    PROT 5.7 (L) 09/25/2022    ALBUMIN 2.5 (L) 09/26/2022    ALBUMIN 2.4 (L) 09/25/2022    BILITOT 2.0 (H) 09/26/2022    BILITOT 1.9 (H) 09/25/2022    ALKPHOS 73 09/26/2022    ALKPHOS 117 09/25/2022    AST 20 09/26/2022    AST 18 09/25/2022    ALT 13 09/26/2022    ALT 13 09/25/2022    ANIONGAP 14 09/30/2022    ANIONGAP 11 09/26/2022       Lab Results   Component Value Date    WBC 5.56 09/30/2022    WBC 4.85 09/29/2022    RBC 3.23 (L) 09/30/2022    RBC 3.28 (L) 09/29/2022    HGB 7.2 (L) 09/30/2022    HGB 7.6 (L) 09/29/2022    HCT 26.6 (L) 09/30/2022    HCT 27.0 (L) 09/29/2022    MCV 82 09/30/2022    MCV 82 09/29/2022    MCH 22.3 (L) 09/30/2022    MCH 23.2 (L) 09/29/2022    MCHC 27.1 (L) 09/30/2022    MCHC 28.1 (L) 09/29/2022    RDW 24.8 (H) 09/30/2022    RDW 24.9 (H) 09/29/2022     09/30/2022     09/29/2022    MPV 10.4 09/30/2022    MPV 10.4 09/29/2022     IMMGR 0.4 09/30/2022    IMMGR 0.4 09/29/2022    IGABS 0.02 09/30/2022    IGABS 0.02 09/29/2022    LYMPH 1.7 09/30/2022    LYMPH 29.7 09/30/2022    MONO 0.3 09/30/2022    MONO 6.1 09/30/2022    EOS 0.3 09/30/2022    EOS 0.2 09/29/2022    BASO 0.05 09/30/2022    BASO 0.05 09/29/2022    NRBC 0 09/30/2022    NRBC 0 09/29/2022    GRAN 3.2 09/30/2022    GRAN 58.2 09/30/2022    EOSINOPHIL 4.7 09/30/2022    EOSINOPHIL 3.9 09/29/2022    BASOPHIL 0.9 09/30/2022    BASOPHIL 1.0 09/29/2022    PLTEST Appears normal 09/29/2022    PLTEST Appears normal 09/28/2022    ANISO Slight 09/29/2022    ANISO Moderate 09/28/2022    HYPO Moderate 09/29/2022    HYPO Moderate 09/28/2022       Lab Results   Component Value Date    BNP 2,516 (H) 09/28/2022    BNP 3,048 (H) 09/26/2022    MG 1.6 09/30/2022    MG 1.7 09/24/2022    PHOS 3.5 09/23/2022    TROPONINI 0.016 09/25/2022    TROPONINI 0.021 09/25/2022    HGBA1C 5.0 09/23/2022    HGBA1C 4.8 06/04/2022       Lab Results   Component Value Date    IRON 34 09/23/2022    TIBC 448 09/23/2022    FERRITIN 95 09/23/2022    COLORU Yellow 09/23/2022    APPEARANCEUA Clear 09/23/2022    PHUR 6.0 09/23/2022    SPECGRAV 1.010 09/23/2022    PROTEINUA Negative 09/23/2022    GLUCUA Negative 09/23/2022    KETONESU Negative 09/23/2022    BILIRUBINUA Negative 09/23/2022    OCCULTUA Negative 09/23/2022    NITRITE Negative 09/23/2022    LEUKOCYTESUR 3+ (A) 09/23/2022       List all implanted cardiac devices:   Has life vest  Cardiomems: no    Current Outpatient Medications on File Prior to Visit   Medication Sig Dispense Refill    acetaminophen (TYLENOL) 325 MG tablet Take 2 tablets (650 mg total) by mouth every 6 (six) hours as needed for Pain.  0    apixaban (ELIQUIS) 2.5 mg Tab Take 1 tablet (2.5 mg total) by mouth 2 (two) times daily. 60 tablet 2    ascorbic acid, vitamin C, (VITAMIN C) 500 MG tablet Take 1 tablet (500 mg total) by mouth every evening. 30 tablet 0    atorvastatin (LIPITOR) 40 MG tablet Take  40 mg by mouth once daily.      benzonatate (TESSALON) 200 MG capsule Take by mouth.      ferrous sulfate 324 mg (65 mg iron) TbEC Take 1 tablet (324 mg total) by mouth 2 (two) times daily with meals. 60 tablet 0    folic acid (FOLVITE) 1 MG tablet Take 1 tablet (1 mg total) by mouth once daily. 30 tablet 0    furosemide (LASIX) 40 MG tablet Take 40 mg by mouth once daily.      JARDIANCE 10 mg tablet Take 10 mg by mouth once daily.      multivitamin Tab Take 1 tablet by mouth once daily. 30 tablet 0    potassium chloride SA (K-DUR,KLOR-CON M) 10 MEQ tablet Take 1 tablet by mouth once daily.      QUEtiapine (SEROQUEL) 100 MG Tab Take 1 tablet (100 mg total) by mouth every evening. 30 tablet 2     No current facility-administered medications on file prior to visit.         HPI:  Patient can walk around house, not doing much ambulation since dc   Patient sleeps on 2 pillows   Patient wakes up SOB, has to get out of bed, associated cough, sputum denies   Palpitations - none   Dizzy, light-headed, pre-syncope or syncope none   Since discharge frequency of performing weights, home weight and weight change-not getting daily weight    Other information felt pertinent to HPI    Ms. Echols is a 37-year-old  female with PMH significant for systolic CHF (EF 25% on echo done June 2022), chronic cocaine user, right lower extremity DVT status post thrombectomy on in June, presented to [facility] ED complaining of coughing up blood the past 2-3 months, since the thrombectomy procedure.  Patient on Xarelto.  States that she has been to multiple hospitals for the same issue, was told that there is nothing abnormal and was sent home.      Hospital Course:   9/23: 37-year-old female admitted for hemoptysis, upon further investigation patient has blood-tinged sputum not vomitus.  Iron studies pending.  Since stopping Eliquis this hospitalization she has not had any blood-tinged sputum production.  BB resumed  and will further diurese.  Monitor H/ H     9/24: Iron studies c/w with anemia, although Hgb improved. Continues to cough up blood tinged sputum which is likely related to CHF and pulm edema. R/LHC Monday per Cards. Diurese and monitor strict I&Os. CT chest showed concern for PNA vs edema but no signs of lesion. No leukocytosis, afebrile. Cont treating UTI.     9/25: Episode of chest pain this am. Trops negative. Cont IV diuresis with Lasix. Plan for R/LHC tomorrow. Cards following. Hgb stable and improved to 7.9 despite patient coughing up bloody sputum and as stated above likely from right heart failure and pulm edema.     9/26: Cont IV diuresis and once volume status improved plan for MPI stress test per Cards. Hgb improving. Procal negative but CRP elevated treating for PNA empirically. Cont holding Eliquis and send sputum to micro.      9/27: Cont IV diuresis and optimize for MPI stress test. Will add midodrine to augment BP because patient definitely needs IV diuresis. Cont abx. Hemoptysis slowly improving.      9/28: Will need an IVC filter because unable to tolerate PO AC. Scheduled to be placed today per IR. Hgb remains stable. CXR this am showed no significant change--per pulm possible Bronch? Cards following.      9/29: Initiated on low dose Eliquis per Pulm recs. IVC filter in place. Needs Lasix low dose per Cards with close follow up. Discharge on PO Levaquinx5 days.  Pulm and cards referrals sent. If stable d/c in the am      9/30 Patient given Iv venofer for low iron and dose of sq Vit B12. Patient's overall condition remained stable and improved and she was discharged to home once Life vest obtained.        Patient presents today for first TCC visit. Her breathing has improved, no LE edema. Is taking lasix 40 daily. Wearing life vest. No PND, orthopnea. HAs had multiple admits at [facility] for ADHF, and discharged home. Not on GDMT due to low BP. Bilirubin was as high as 3 inpatient.      PHYSICAL:    Vitals:    10/07/22 1020   BP: 90/60   Pulse: (!) 118      Wt Readings from Last 3 Encounters:   10/07/22 55.1 kg (121 lb 7.6 oz)   09/30/22 52.3 kg (115 lb 4.8 oz)       JVD: no   Heart rhythm: regular  Cardiac murmur: tachy  S3: yes  S4: no  Lungs: clear  Liver span: 10 cm:   Hepatojugular reflux: no  Edema: no      ASSESSMENT: acute on chronic systolic HF    PLAN:      Patient Instructions:   Instruct the patient to notify this clinic if HH, a physician or an advanced care provider wants to change medication one of their HF medications   Activity and Diet restrictions:   Recommend 2-3 gram sodium restriction and 1500cc- 2000cc fluid restriction.  Encourage physical activity with graded exercise program.  Requested patient to weigh themselves daily, and to notify us if their weight increases by more than 3 lbs in 1 day or 5 lbs in 3 days.    Assigned dry weight on home scale: 52 kg   Medication changes (include current dose and changed dose)  Unfortunately I will not be able to add any GDMT to patient. With multiple admits and ER visits for ADHF and now end organ dysfunction, needs referral to advanced HF for workup.  Will schedule in [facility] clinic to start but discussed she will need to go to [facility] [place] [place] for testing, she is agreeable.   Upcoming labs and date anticipated: RTC as soon as advanced HF available

## 2022-10-11 ENCOUNTER — OFFICE VISIT (OUTPATIENT)
Dept: TRANSPLANT | Facility: CLINIC | Age: 37
End: 2022-10-11
Payer: MEDICAID

## 2022-10-11 ENCOUNTER — TELEPHONE (OUTPATIENT)
Dept: TRANSPLANT | Facility: CLINIC | Age: 37
End: 2022-10-11
Payer: MEDICAID

## 2022-10-11 VITALS
DIASTOLIC BLOOD PRESSURE: 58 MMHG | HEIGHT: 64 IN | BODY MASS INDEX: 21.38 KG/M2 | SYSTOLIC BLOOD PRESSURE: 90 MMHG | WEIGHT: 125.25 LBS | HEART RATE: 122 BPM

## 2022-10-11 DIAGNOSIS — F14.10 COCAINE ABUSE: ICD-10-CM

## 2022-10-11 DIAGNOSIS — I42.8 CARDIOMYOPATHY, NONISCHEMIC: ICD-10-CM

## 2022-10-11 DIAGNOSIS — Z95.828 S/P INSERTION OF IVC (INFERIOR VENA CAVAL) FILTER: ICD-10-CM

## 2022-10-11 DIAGNOSIS — I50.42 CHRONIC COMBINED SYSTOLIC AND DIASTOLIC HEART FAILURE: Primary | ICD-10-CM

## 2022-10-11 DIAGNOSIS — R04.2 HEMOPTYSIS: ICD-10-CM

## 2022-10-11 PROCEDURE — 99212 OFFICE O/P EST SF 10 MIN: CPT | Mod: PBBFAC | Performed by: INTERNAL MEDICINE

## 2022-10-11 PROCEDURE — 3074F SYST BP LT 130 MM HG: CPT | Mod: CPTII,,, | Performed by: INTERNAL MEDICINE

## 2022-10-11 PROCEDURE — 3074F PR MOST RECENT SYSTOLIC BLOOD PRESSURE < 130 MM HG: ICD-10-PCS | Mod: CPTII,,, | Performed by: INTERNAL MEDICINE

## 2022-10-11 PROCEDURE — 1111F DSCHRG MED/CURRENT MED MERGE: CPT | Mod: CPTII,,, | Performed by: INTERNAL MEDICINE

## 2022-10-11 PROCEDURE — 3078F DIAST BP <80 MM HG: CPT | Mod: CPTII,,, | Performed by: INTERNAL MEDICINE

## 2022-10-11 PROCEDURE — 3044F PR MOST RECENT HEMOGLOBIN A1C LEVEL <7.0%: ICD-10-PCS | Mod: CPTII,,, | Performed by: INTERNAL MEDICINE

## 2022-10-11 PROCEDURE — 3008F BODY MASS INDEX DOCD: CPT | Mod: CPTII,,, | Performed by: INTERNAL MEDICINE

## 2022-10-11 PROCEDURE — 1111F PR DISCHARGE MEDS RECONCILED W/ CURRENT OUTPATIENT MED LIST: ICD-10-PCS | Mod: CPTII,,, | Performed by: INTERNAL MEDICINE

## 2022-10-11 PROCEDURE — 3044F HG A1C LEVEL LT 7.0%: CPT | Mod: CPTII,,, | Performed by: INTERNAL MEDICINE

## 2022-10-11 PROCEDURE — 99205 PR OFFICE/OUTPT VISIT, NEW, LEVL V, 60-74 MIN: ICD-10-PCS | Mod: S$PBB,,, | Performed by: INTERNAL MEDICINE

## 2022-10-11 PROCEDURE — 3078F PR MOST RECENT DIASTOLIC BLOOD PRESSURE < 80 MM HG: ICD-10-PCS | Mod: CPTII,,, | Performed by: INTERNAL MEDICINE

## 2022-10-11 PROCEDURE — 99205 OFFICE O/P NEW HI 60 MIN: CPT | Mod: S$PBB,,, | Performed by: INTERNAL MEDICINE

## 2022-10-11 PROCEDURE — 3008F PR BODY MASS INDEX (BMI) DOCUMENTED: ICD-10-PCS | Mod: CPTII,,, | Performed by: INTERNAL MEDICINE

## 2022-10-11 PROCEDURE — 99999 PR PBB SHADOW E&M-EST. PATIENT-LVL II: CPT | Mod: PBBFAC,,, | Performed by: INTERNAL MEDICINE

## 2022-10-11 PROCEDURE — 99999 PR PBB SHADOW E&M-EST. PATIENT-LVL II: ICD-10-PCS | Mod: PBBFAC,,, | Performed by: INTERNAL MEDICINE

## 2022-10-11 RX ORDER — METOPROLOL SUCCINATE 25 MG/1
25 TABLET, EXTENDED RELEASE ORAL NIGHTLY
Qty: 30 TABLET | Refills: 11 | Status: ON HOLD | OUTPATIENT
Start: 2022-10-11 | End: 2022-10-27 | Stop reason: SDUPTHER

## 2022-10-11 NOTE — PROGRESS NOTES
Subjective:       HPI:  Ms. Echols is a very pleasant  37 y.o. year old with stage C/D HFrEF (EF 15%, LVEDD=6.4 cm), chronic cocaine user (most recent positive drug screen was in July), right lower extremity DVT status post thrombectomy and recent IVC filter placement who is referred by our colleague Tiffanie Clemons for evaluation for advanced HF therapies. Clinically reports NYHA class III symptoms. Had multiple HF admissions. Unfortunately owing to her low BP GDMT has been very limited and is only on lasix and empagliflozin. She is very tachycardic this am. She also reports coughing up blood (chronic issue). She remains on Eliquis. Labs from 9/26 showed normal creatinine but eleavted T bili at 2.0.    2D Echo with CFD done on 9/23/2022  The left ventricle is severely enlarged with concentric hypertrophy and severely decreased systolic function.  The estimated ejection fraction is 15%.  Severe left atrial enlargement.  Severe right atrial enlargement.  Grade III left ventricular diastolic dysfunction.  The estimated PA systolic pressure is 106 mmHg.  Normal right ventricular size with mildly reduced right ventricular systolic function.  There is pulmonary hypertension.  Elevated central venous pressure (15 mmHg).  Moderate to severe tricuspid regurgitation.  There is severe left ventricular global hypokinesis.  Moderate pulmonic regurgitation.  Moderate mitral regurgitation.  There is a small right pleural effusion.       Past Medical History:   Diagnosis Date    Bipolar disorder, unspecified     CHF (congestive heart failure)     Hypertension      Past Surgical History:   Procedure Laterality Date    INSERTION OF INFERIOR VENA CAVAL FILTER N/A 9/28/2022    Procedure: INSERTION, FILTER, INFERIOR VENA CAVA;  Surgeon: Clarence Alicea MD;  [facility] C[facility] LAB;  Service: General;  Laterality: N/A;     OB History    No obstetric history on file.       Review of Systems   Constitutional: Positive for malaise/fatigue  "and weight loss. Negative for chills, decreased appetite, diaphoresis, fever, night sweats and weight gain.   Eyes: Negative.    Cardiovascular:  Positive for dyspnea on exertion, orthopnea and paroxysmal nocturnal dyspnea. Negative for chest pain, claudication, cyanosis, irregular heartbeat, leg swelling, near-syncope, palpitations and syncope.   Respiratory:  Positive for cough and hemoptysis. Negative for shortness of breath.    Endocrine: Negative.    Hematologic/Lymphatic: Negative.    Skin:  Negative for color change, dry skin and nail changes.   Musculoskeletal: Negative.    Gastrointestinal: Negative.    Genitourinary: Negative.    Neurological:  Negative for weakness.     Objective:   Blood pressure (!) 90/58, pulse (!) 122, height 5' 4" (1.626 m), weight 56.8 kg (125 lb 3.5 oz).body mass index is 21.49 kg/m².  Physical Exam  Vitals and nursing note reviewed.   Constitutional:       Appearance: She is cachectic. She is ill-appearing and toxic-appearing.   HENT:      Head: Normocephalic.   Eyes:      Pupils: Pupils are equal, round, and reactive to light.   Neck:      Vascular: No JVD.   Cardiovascular:      Rate and Rhythm: Regular rhythm. Tachycardia present.      Chest Wall: PMI is not displaced.      Pulses: Intact distal pulses.      Heart sounds: No murmur heard.    No friction rub. Gallop present. S3 sounds present.   Pulmonary:      Effort: Pulmonary effort is normal.      Breath sounds: Normal breath sounds. No wheezing or rales.   Abdominal:      General: Bowel sounds are normal.      Palpations: Abdomen is soft.   Musculoskeletal:      Cervical back: Neck supple.   Neurological:      Mental Status: She is alert and oriented to person, place, and time.       Labs:    Chemistry        Component Value Date/Time     (L) 09/30/2022 1302    K 4.2 09/30/2022 1302     09/30/2022 1302    CO2 18 (L) 09/30/2022 1302    BUN 23 (H) 09/30/2022 1302    CREATININE 1.2 09/30/2022 1302     " 09/30/2022 1302        Component Value Date/Time    CALCIUM 8.8 09/30/2022 1302    ALKPHOS 73 09/26/2022 0827    AST 20 09/26/2022 0827    ALT 13 09/26/2022 0827    BILITOT 2.0 (H) 09/26/2022 0827          Magnesium   Date Value Ref Range Status   09/30/2022 1.6 1.6 - 2.6 mg/dL Final     Lab Results   Component Value Date    WBC 5.56 09/30/2022    HGB 7.2 (L) 09/30/2022    HCT 26.6 (L) 09/30/2022     09/30/2022     Lab Results   Component Value Date    INR 1.4 (H) 09/23/2022     BNP   Date Value Ref Range Status   09/28/2022 2,516 (H) 0 - 99 pg/mL Final     Comment:     Values of less than 100 pg/ml are consistent with non-CHF populations.   09/26/2022 3,048 (H) 0 - 99 pg/mL Final     Comment:     Values of less than 100 pg/ml are consistent with non-CHF populations.   09/23/2022 3,610 (H) 0 - 99 pg/mL Final     Comment:     Values of less than 100 pg/ml are consistent with non-CHF populations.         Assessment:      1. Chronic combined systolic and diastolic heart failure    2. Cardiomyopathy, nonischemic    3. Hemoptysis    4. Cocaine abuse    5. S/P insertion of IVC (inferior vena caval) filter        Plan:   Stage C/D HFrEF, NYHA class III symtpoms with frequent HF admissions and low BP preventing GDMT.   Unfortunately due to her recent cocaine use and multiple comorbidities including DVT, hemoptysis, anemia and current functional status she is not a candidate for advanced HF therapies.  I do recommend starting low dose metoprolol succinate 25 mg at night (i have sent a prescription to her pharmacy)  Continue current doses of empagliflozin and lasix.   Consider palliative care consult  She reports coughing up blood daily so recommend to be reevaluated by pulmonary as she is still on Eliquis (has an IVC filter now)  Recommend 2 gram sodium restriction and 1500cc fluid restriction.  Encourage physical activity with graded exercise program.  Requested patient to weigh themselves daily, and to notify us if  their weight increases by more than 3 lbs in 1 day or 5 lbs in 1 week.       Transplant candidacy:  Patient is a 37 y.o. year old female with heart failure is being seen for possible LVAD and OHT. she is not scheduled for medical management only. The patient will follow up with referring provider.    Thank you for allowing me to participate in the care of this very pleasant patient. DO not hesitate to contact me should you have any questions.     Nadira Abdi MD

## 2022-10-24 ENCOUNTER — HOSPITAL ENCOUNTER (OUTPATIENT)
Facility: HOSPITAL | Age: 37
Discharge: HOME | End: 2022-10-27
Attending: EMERGENCY MEDICINE | Admitting: INTERNAL MEDICINE
Payer: MEDICAID

## 2022-10-24 DIAGNOSIS — R07.9 CHEST PAIN, UNSPECIFIED TYPE: ICD-10-CM

## 2022-10-24 DIAGNOSIS — I50.9 ACUTE CONGESTIVE HEART FAILURE, UNSPECIFIED HEART FAILURE TYPE: Primary | ICD-10-CM

## 2022-10-24 DIAGNOSIS — R79.89 TROPONIN LEVEL ELEVATED: ICD-10-CM

## 2022-10-24 DIAGNOSIS — I50.42 CHRONIC COMBINED SYSTOLIC AND DIASTOLIC HEART FAILURE: ICD-10-CM

## 2022-10-24 DIAGNOSIS — R06.02 SHORTNESS OF BREATH: ICD-10-CM

## 2022-10-24 PROCEDURE — 96374 THER/PROPH/DIAG INJ IV PUSH: CPT

## 2022-10-24 PROCEDURE — 93010 ELECTROCARDIOGRAM REPORT: CPT | Mod: ,,, | Performed by: INTERNAL MEDICINE

## 2022-10-24 PROCEDURE — 93010 EKG 12-LEAD: ICD-10-PCS | Mod: ,,, | Performed by: INTERNAL MEDICINE

## 2022-10-24 PROCEDURE — 99285 EMERGENCY DEPT VISIT HI MDM: CPT | Mod: 25

## 2022-10-24 PROCEDURE — 93005 ELECTROCARDIOGRAM TRACING: CPT

## 2022-10-24 RX ORDER — FUROSEMIDE 10 MG/ML
80 INJECTION INTRAMUSCULAR; INTRAVENOUS
Status: COMPLETED | OUTPATIENT
Start: 2022-10-25 | End: 2022-10-25

## 2022-10-25 PROBLEM — R79.89 ELEVATED TROPONIN: Status: ACTIVE | Noted: 2022-10-25

## 2022-10-25 LAB
ALBUMIN SERPL BCP-MCNC: 2.8 G/DL (ref 3.5–5.2)
ALP SERPL-CCNC: 71 U/L (ref 55–135)
ALT SERPL W/O P-5'-P-CCNC: 16 U/L (ref 10–44)
AMPHET+METHAMPHET UR QL: NEGATIVE
ANION GAP SERPL CALC-SCNC: 13 MMOL/L (ref 8–16)
ANISOCYTOSIS BLD QL SMEAR: ABNORMAL
AST SERPL-CCNC: 27 U/L (ref 10–40)
BACTERIA #/AREA URNS HPF: NORMAL /HPF
BARBITURATES UR QL SCN>200 NG/ML: NEGATIVE
BASOPHILS # BLD AUTO: ABNORMAL K/UL
BASOPHILS NFR BLD: 0 % (ref 0–1.9)
BENZODIAZ UR QL SCN>200 NG/ML: NEGATIVE
BILIRUB SERPL-MCNC: 2.6 MG/DL (ref 0.1–1)
BILIRUB UR QL STRIP: NEGATIVE
BNP SERPL-MCNC: 3458 PG/ML (ref 0–99)
BUN SERPL-MCNC: 26 MG/DL (ref 6–20)
BZE UR QL SCN: NEGATIVE
CALCIUM SERPL-MCNC: 8.8 MG/DL (ref 8.7–10.5)
CANNABINOIDS UR QL SCN: NEGATIVE
CHLORIDE SERPL-SCNC: 103 MMOL/L (ref 95–110)
CLARITY UR: CLEAR
CO2 SERPL-SCNC: 19 MMOL/L (ref 23–29)
COLOR UR: YELLOW
CREAT SERPL-MCNC: 0.9 MG/DL (ref 0.5–1.4)
CREAT UR-MCNC: 28.8 MG/DL (ref 15–325)
DACRYOCYTES BLD QL SMEAR: ABNORMAL
DIFFERENTIAL METHOD: ABNORMAL
EOSINOPHIL # BLD AUTO: ABNORMAL K/UL
EOSINOPHIL NFR BLD: 0 % (ref 0–8)
ERYTHROCYTE [DISTWIDTH] IN BLOOD BY AUTOMATED COUNT: 24.7 % (ref 11.5–14.5)
EST. GFR  (NO RACE VARIABLE): >60 ML/MIN/1.73 M^2
ESTIMATED AVG GLUCOSE: 91 MG/DL (ref 68–131)
FERRITIN SERPL-MCNC: 70 NG/ML (ref 20–300)
FOLATE SERPL-MCNC: 13.7 NG/ML (ref 4–24)
GLUCOSE SERPL-MCNC: 72 MG/DL (ref 70–110)
GLUCOSE UR QL STRIP: NEGATIVE
HBA1C MFR BLD: 4.8 % (ref 4–5.6)
HCT VFR BLD AUTO: 24.9 % (ref 37–48.5)
HGB BLD-MCNC: 7.1 G/DL (ref 12–16)
HGB UR QL STRIP: NEGATIVE
HYALINE CASTS #/AREA URNS LPF: 1 /LPF
HYPOCHROMIA BLD QL SMEAR: ABNORMAL
IMM GRANULOCYTES # BLD AUTO: ABNORMAL K/UL
IMM GRANULOCYTES NFR BLD AUTO: ABNORMAL % (ref 0–0.5)
IRON SERPL-MCNC: 23 UG/DL (ref 30–160)
KETONES UR QL STRIP: NEGATIVE
LEUKOCYTE ESTERASE UR QL STRIP: ABNORMAL
LYMPHOCYTES # BLD AUTO: ABNORMAL K/UL
LYMPHOCYTES NFR BLD: 33 % (ref 18–48)
MAGNESIUM SERPL-MCNC: 1.7 MG/DL (ref 1.6–2.6)
MCH RBC QN AUTO: 23.4 PG (ref 27–31)
MCHC RBC AUTO-ENTMCNC: 28.5 G/DL (ref 32–36)
MCV RBC AUTO: 82 FL (ref 82–98)
METHADONE UR QL SCN>300 NG/ML: NEGATIVE
MICROSCOPIC COMMENT: NORMAL
MONOCYTES # BLD AUTO: ABNORMAL K/UL
MONOCYTES NFR BLD: 4 % (ref 4–15)
NEUTROPHILS # BLD AUTO: ABNORMAL K/UL
NEUTROPHILS NFR BLD: 63 % (ref 38–73)
NITRITE UR QL STRIP: NEGATIVE
NRBC BLD-RTO: 0 /100 WBC
OPIATES UR QL SCN: NEGATIVE
PCP UR QL SCN>25 NG/ML: NEGATIVE
PH UR STRIP: 6 [PH] (ref 5–8)
PHOSPHATE SERPL-MCNC: 3.3 MG/DL (ref 2.7–4.5)
PLATELET # BLD AUTO: 351 K/UL (ref 150–450)
PLATELET BLD QL SMEAR: ABNORMAL
PMV BLD AUTO: 9.6 FL (ref 9.2–12.9)
POIKILOCYTOSIS BLD QL SMEAR: ABNORMAL
POLYCHROMASIA BLD QL SMEAR: ABNORMAL
POTASSIUM SERPL-SCNC: 4.1 MMOL/L (ref 3.5–5.1)
PROT SERPL-MCNC: 6.6 G/DL (ref 6–8.4)
PROT UR QL STRIP: NEGATIVE
RBC # BLD AUTO: 3.03 M/UL (ref 4–5.4)
SARS-COV-2 RDRP RESP QL NAA+PROBE: NEGATIVE
SATURATED IRON: 6 % (ref 20–50)
SCHISTOCYTES BLD QL SMEAR: PRESENT
SODIUM SERPL-SCNC: 135 MMOL/L (ref 136–145)
SP GR UR STRIP: 1 (ref 1–1.03)
T4 FREE SERPL-MCNC: 1.08 NG/DL (ref 0.71–1.51)
TARGETS BLD QL SMEAR: ABNORMAL
TOTAL IRON BINDING CAPACITY: 406 UG/DL (ref 250–450)
TOXICOLOGY INFORMATION: NORMAL
TRANSFERRIN SERPL-MCNC: 274 MG/DL (ref 200–375)
TROPONIN I SERPL DL<=0.01 NG/ML-MCNC: 0.03 NG/ML (ref 0–0.03)
TROPONIN I SERPL DL<=0.01 NG/ML-MCNC: 0.04 NG/ML (ref 0–0.03)
TSH SERPL DL<=0.005 MIU/L-ACNC: 1.35 UIU/ML (ref 0.4–4)
URN SPEC COLLECT METH UR: ABNORMAL
UROBILINOGEN UR STRIP-ACNC: NEGATIVE EU/DL
VIT B12 SERPL-MCNC: 1479 PG/ML (ref 210–950)
WBC # BLD AUTO: 5.8 K/UL (ref 3.9–12.7)
WBC #/AREA URNS HPF: 0 /HPF (ref 0–5)

## 2022-10-25 PROCEDURE — 63600175 PHARM REV CODE 636 W HCPCS: Performed by: NURSE PRACTITIONER

## 2022-10-25 PROCEDURE — 82728 ASSAY OF FERRITIN: CPT | Performed by: NURSE PRACTITIONER

## 2022-10-25 PROCEDURE — 83880 ASSAY OF NATRIURETIC PEPTIDE: CPT | Performed by: EMERGENCY MEDICINE

## 2022-10-25 PROCEDURE — 83735 ASSAY OF MAGNESIUM: CPT | Performed by: NURSE PRACTITIONER

## 2022-10-25 PROCEDURE — 99220 PR INITIAL OBSERVATION CARE,LEVL III: ICD-10-PCS | Mod: ,,, | Performed by: INTERNAL MEDICINE

## 2022-10-25 PROCEDURE — 25000003 PHARM REV CODE 250: Performed by: NURSE PRACTITIONER

## 2022-10-25 PROCEDURE — 84484 ASSAY OF TROPONIN QUANT: CPT | Performed by: EMERGENCY MEDICINE

## 2022-10-25 PROCEDURE — G0378 HOSPITAL OBSERVATION PER HR: HCPCS

## 2022-10-25 PROCEDURE — 36415 COLL VENOUS BLD VENIPUNCTURE: CPT | Performed by: NURSE PRACTITIONER

## 2022-10-25 PROCEDURE — 63600175 PHARM REV CODE 636 W HCPCS: Performed by: EMERGENCY MEDICINE

## 2022-10-25 PROCEDURE — 84443 ASSAY THYROID STIM HORMONE: CPT | Performed by: NURSE PRACTITIONER

## 2022-10-25 PROCEDURE — 80053 COMPREHEN METABOLIC PANEL: CPT | Performed by: EMERGENCY MEDICINE

## 2022-10-25 PROCEDURE — 80307 DRUG TEST PRSMV CHEM ANLYZR: CPT | Performed by: NURSE PRACTITIONER

## 2022-10-25 PROCEDURE — 84484 ASSAY OF TROPONIN QUANT: CPT | Mod: 91 | Performed by: NURSE PRACTITIONER

## 2022-10-25 PROCEDURE — 25000003 PHARM REV CODE 250: Performed by: INTERNAL MEDICINE

## 2022-10-25 PROCEDURE — 83036 HEMOGLOBIN GLYCOSYLATED A1C: CPT | Performed by: NURSE PRACTITIONER

## 2022-10-25 PROCEDURE — 84100 ASSAY OF PHOSPHORUS: CPT | Performed by: NURSE PRACTITIONER

## 2022-10-25 PROCEDURE — 82746 ASSAY OF FOLIC ACID SERUM: CPT | Performed by: NURSE PRACTITIONER

## 2022-10-25 PROCEDURE — 94760 N-INVAS EAR/PLS OXIMETRY 1: CPT

## 2022-10-25 PROCEDURE — 82607 VITAMIN B-12: CPT | Performed by: NURSE PRACTITIONER

## 2022-10-25 PROCEDURE — 84439 ASSAY OF FREE THYROXINE: CPT | Performed by: NURSE PRACTITIONER

## 2022-10-25 PROCEDURE — U0002 COVID-19 LAB TEST NON-CDC: HCPCS | Performed by: EMERGENCY MEDICINE

## 2022-10-25 PROCEDURE — 84466 ASSAY OF TRANSFERRIN: CPT | Performed by: NURSE PRACTITIONER

## 2022-10-25 PROCEDURE — 81000 URINALYSIS NONAUTO W/SCOPE: CPT | Mod: 59 | Performed by: NURSE PRACTITIONER

## 2022-10-25 PROCEDURE — 96376 TX/PRO/DX INJ SAME DRUG ADON: CPT

## 2022-10-25 PROCEDURE — 99220 PR INITIAL OBSERVATION CARE,LEVL III: CPT | Mod: ,,, | Performed by: INTERNAL MEDICINE

## 2022-10-25 RX ORDER — AMOXICILLIN 250 MG
1 CAPSULE ORAL 2 TIMES DAILY
Status: DISCONTINUED | OUTPATIENT
Start: 2022-10-25 | End: 2022-10-27 | Stop reason: HOSPADM

## 2022-10-25 RX ORDER — ATORVASTATIN CALCIUM 40 MG/1
40 TABLET, FILM COATED ORAL DAILY
Status: DISCONTINUED | OUTPATIENT
Start: 2022-10-25 | End: 2022-10-27 | Stop reason: HOSPADM

## 2022-10-25 RX ORDER — SODIUM CHLORIDE 0.9 % (FLUSH) 0.9 %
10 SYRINGE (ML) INJECTION
Status: DISCONTINUED | OUTPATIENT
Start: 2022-10-25 | End: 2022-10-27 | Stop reason: HOSPADM

## 2022-10-25 RX ORDER — FOLIC ACID 1 MG/1
1 TABLET ORAL DAILY
Status: DISCONTINUED | OUTPATIENT
Start: 2022-10-25 | End: 2022-10-27 | Stop reason: HOSPADM

## 2022-10-25 RX ORDER — QUETIAPINE FUMARATE 100 MG/1
100 TABLET, FILM COATED ORAL NIGHTLY
Status: DISCONTINUED | OUTPATIENT
Start: 2022-10-25 | End: 2022-10-27 | Stop reason: HOSPADM

## 2022-10-25 RX ORDER — OXYCODONE AND ACETAMINOPHEN 10; 325 MG/1; MG/1
1 TABLET ORAL EVERY 6 HOURS PRN
Status: DISCONTINUED | OUTPATIENT
Start: 2022-10-25 | End: 2022-10-27 | Stop reason: HOSPADM

## 2022-10-25 RX ORDER — HYDROXYZINE PAMOATE 25 MG/1
25 CAPSULE ORAL EVERY 8 HOURS PRN
Status: DISCONTINUED | OUTPATIENT
Start: 2022-10-25 | End: 2022-10-27 | Stop reason: HOSPADM

## 2022-10-25 RX ORDER — POLYETHYLENE GLYCOL 3350 17 G/17G
17 POWDER, FOR SOLUTION ORAL DAILY
Status: DISCONTINUED | OUTPATIENT
Start: 2022-10-25 | End: 2022-10-27 | Stop reason: HOSPADM

## 2022-10-25 RX ORDER — NAPROXEN SODIUM 220 MG/1
81 TABLET, FILM COATED ORAL DAILY
Status: DISCONTINUED | OUTPATIENT
Start: 2022-10-25 | End: 2022-10-27 | Stop reason: HOSPADM

## 2022-10-25 RX ORDER — FUROSEMIDE 10 MG/ML
40 INJECTION INTRAMUSCULAR; INTRAVENOUS
Status: DISCONTINUED | OUTPATIENT
Start: 2022-10-25 | End: 2022-10-26

## 2022-10-25 RX ORDER — METOPROLOL SUCCINATE 25 MG/1
25 TABLET, EXTENDED RELEASE ORAL NIGHTLY
Status: DISCONTINUED | OUTPATIENT
Start: 2022-10-25 | End: 2022-10-26

## 2022-10-25 RX ORDER — HYDROCODONE BITARTRATE AND ACETAMINOPHEN 7.5; 325 MG/1; MG/1
1 TABLET ORAL EVERY 6 HOURS PRN
Status: DISCONTINUED | OUTPATIENT
Start: 2022-10-25 | End: 2022-10-25

## 2022-10-25 RX ORDER — ONDANSETRON 2 MG/ML
4 INJECTION INTRAMUSCULAR; INTRAVENOUS EVERY 8 HOURS PRN
Status: DISCONTINUED | OUTPATIENT
Start: 2022-10-25 | End: 2022-10-27 | Stop reason: HOSPADM

## 2022-10-25 RX ADMIN — HYDROCODONE BITARTRATE AND ACETAMINOPHEN 1 TABLET: 7.5; 325 TABLET ORAL at 03:10

## 2022-10-25 RX ADMIN — FUROSEMIDE 40 MG: 10 INJECTION, SOLUTION INTRAMUSCULAR; INTRAVENOUS at 10:10

## 2022-10-25 RX ADMIN — APIXABAN 2.5 MG: 2.5 TABLET, FILM COATED ORAL at 08:10

## 2022-10-25 RX ADMIN — FUROSEMIDE 80 MG: 10 INJECTION, SOLUTION INTRAMUSCULAR; INTRAVENOUS at 01:10

## 2022-10-25 RX ADMIN — POLYETHYLENE GLYCOL 3350 17 G: 17 POWDER, FOR SOLUTION ORAL at 10:10

## 2022-10-25 RX ADMIN — APIXABAN 2.5 MG: 2.5 TABLET, FILM COATED ORAL at 10:10

## 2022-10-25 RX ADMIN — ASPIRIN 81 MG CHEWABLE TABLET 81 MG: 81 TABLET CHEWABLE at 10:10

## 2022-10-25 RX ADMIN — FOLIC ACID 1 MG: 1 TABLET ORAL at 10:10

## 2022-10-25 RX ADMIN — ATORVASTATIN CALCIUM 40 MG: 40 TABLET, FILM COATED ORAL at 10:10

## 2022-10-25 RX ADMIN — OXYCODONE AND ACETAMINOPHEN 1 TABLET: 10; 325 TABLET ORAL at 10:10

## 2022-10-25 RX ADMIN — HYDROXYZINE PAMOATE 25 MG: 25 CAPSULE ORAL at 09:10

## 2022-10-25 RX ADMIN — SENNOSIDES AND DOCUSATE SODIUM 1 TABLET: 50; 8.6 TABLET ORAL at 10:10

## 2022-10-25 RX ADMIN — METOPROLOL SUCCINATE 25 MG: 25 TABLET, FILM COATED, EXTENDED RELEASE ORAL at 05:10

## 2022-10-25 RX ADMIN — SACUBITRIL AND VALSARTAN 1 TABLET: 24; 26 TABLET, FILM COATED ORAL at 08:10

## 2022-10-25 RX ADMIN — QUETIAPINE FUMARATE 100 MG: 100 TABLET ORAL at 08:10

## 2022-10-25 RX ADMIN — SENNOSIDES AND DOCUSATE SODIUM 1 TABLET: 50; 8.6 TABLET ORAL at 08:10

## 2022-10-25 NOTE — ASSESSMENT & PLAN NOTE
Appears at baseline   Iron studies   May benefit from PRBC after diuresis       10/25  HGB 7.1  Iron studies in process  Monitor, transfuse PRBC as needed

## 2022-10-25 NOTE — PLAN OF CARE
[facility] - Tele[facility] (Hospital)  Initial Discharge Assessment       Primary Care Provider: Juan Contreras MD    Admission Diagnosis: Shortness of breath [R06.02]  Troponin level elevated [R77.8]  Chest pain, unspecified type [R07.9]  Acute congestive heart failure, unspecified heart failure type [I50.9]    Admission Date: 10/24/2022  Expected Discharge Date:     Discharge Barriers Identified: None    Payor: MEDICAID / Plan: [place] / Product Type: Managed Medicaid /     Extended Emergency Contact Information  Primary Emergency Contact: Yee Echols   [facility]  Home Phone: [phone]  Mobile Phone: [phone]  Relation: Mother    Discharge Plan A: Home with family         [employer] [id number]  [address] [facility] [address] [facility] [id number] [place] [address]  Phone: [phone] Fax: [phone]      Initial Assessment (most recent)       Adult Discharge Assessment - 10/25/22 1558          Discharge Assessment    Assessment Type Discharge Planning Assessment     Confirmed/corrected address, phone number and insurance Yes     Confirmed Demographics --   updated in demographics    Source of Information patient     Communicated PEPITO with patient/caregiver Date not available/Unable to determine     Reason For Admission SOB     Lives With other relative(s);child(messi), dependent;other (see comments)     Facility Arrived From: home     Do you expect to return to your current living situation? Yes     Do you have help at home or someone to help you manage your care at home? Yes     Who are your caregiver(s) and their phone number(s)? many family members     Prior to hospitilization cognitive status: Alert/Oriented     Current cognitive status: Alert/Oriented     Walking or Climbing Stairs Difficulty none     Dressing/Bathing Difficulty none     Home Accessibility stairs to enter home     Number of Stairs, Main Entrance two      Home Layout Able to live on 1st floor     Equipment Currently Used at Home none     Readmission within 30 days? No     Patient currently being followed by outpatient case management? No     Do you currently have service(s) that help you manage your care at home? No     Do you take prescription medications? Yes     Do you have prescription coverage? Yes     Coverage medicaid     Do you have any problems affording any of your prescribed medications? No     Is the patient taking medications as prescribed? yes     Who is going to help you get home at discharge? mother or uber     How do you get to doctors appointments? health plan transportation     Are you on dialysis? No     Do you take coumadin? No     Discharge Plan A Home with family     DME Needed Upon Discharge  none     Discharge Plan discussed with: Patient     Discharge Barriers Identified None                      Met with patient. Patient is independent with adls and uses no equipment.  Patient patient lives with aunt and other family members.  (States she lives in a house full of adults and children) No discharge needs currently.    Updated white board with 's name and number. Transitional Care Folder, Discharge Planning Begins on Admission pamphlet, [facility], Advance Directive information given to patient along with the contact information given.Instructed patient or family to call with any questions or concerns.

## 2022-10-25 NOTE — SUBJECTIVE & OBJECTIVE
Past Medical History:   Diagnosis Date    Bipolar disorder, unspecified     CHF (congestive heart failure)     Cocaine abuse     DVT (deep venous thrombosis)     Hypertension        Past Surgical History:   Procedure Laterality Date    INSERTION OF INFERIOR VENA CAVAL FILTER N/A 9/28/2022    Procedure: INSERTION, FILTER, INFERIOR VENA CAVA;  Surgeon: Clarence Alicea MD;  Location: [facility] CATH LAB;  Service: General;  Laterality: N/A;       Review of patient's allergies indicates:  No Known Allergies    No current facility-administered medications on file prior to encounter.     Current Outpatient Medications on File Prior to Encounter   Medication Sig    acetaminophen (TYLENOL) 325 MG tablet Take 2 tablets (650 mg total) by mouth every 6 (six) hours as needed for Pain.    apixaban (ELIQUIS) 2.5 mg Tab Take 1 tablet (2.5 mg total) by mouth 2 (two) times daily.    ascorbic acid, vitamin C, (VITAMIN C) 500 MG tablet Take 1 tablet (500 mg total) by mouth every evening.    atorvastatin (LIPITOR) 40 MG tablet Take 40 mg by mouth once daily.    benzonatate (TESSALON) 200 MG capsule Take by mouth.    ferrous sulfate 324 mg (65 mg iron) TbEC Take 1 tablet (324 mg total) by mouth 2 (two) times daily with meals.    folic acid (FOLVITE) 1 MG tablet Take 1 tablet (1 mg total) by mouth once daily.    furosemide (LASIX) 40 MG tablet Take 40 mg by mouth once daily.    JARDIANCE 10 mg tablet Take 10 mg by mouth once daily.    metoprolol succinate (TOPROL-XL) 25 MG 24 hr tablet Take 1 tablet (25 mg total) by mouth nightly.    multivitamin Tab Take 1 tablet by mouth once daily.    potassium chloride SA (K-DUR,KLOR-CON M) 10 MEQ tablet Take 1 tablet by mouth once daily.    QUEtiapine (SEROQUEL) 100 MG Tab Take 1 tablet (100 mg total) by mouth every evening.     Family History       Problem Relation (Age of Onset)    Hypertension Mother, Father          Tobacco Use    Smoking status: Every Day     Types: Cigarettes    Smokeless tobacco:  Never   Substance and Sexual Activity    Alcohol use: Yes    Drug use: Yes     Types: Cocaine    Sexual activity: Not on file     Review of Systems   Constitutional:  Positive for activity change. Negative for appetite change, chills, diaphoresis, fatigue, fever and unexpected weight change.   HENT:  Negative for congestion, nosebleeds, sinus pressure and sore throat.    Eyes:  Negative for pain, discharge and visual disturbance.   Respiratory:  Positive for shortness of breath. Negative for cough, chest tightness, wheezing and stridor.    Cardiovascular:  Positive for leg swelling. Negative for chest pain and palpitations.   Gastrointestinal:  Positive for abdominal distention (bloating). Negative for abdominal pain, blood in stool, constipation, diarrhea, nausea and vomiting.   Endocrine: Negative for cold intolerance and heat intolerance.   Genitourinary:  Negative for difficulty urinating, dysuria, flank pain, frequency and urgency.   Musculoskeletal:  Positive for arthralgias and back pain. Negative for joint swelling, myalgias, neck pain and neck stiffness.   Skin:  Negative for rash and wound.   Allergic/Immunologic: Negative for food allergies and immunocompromised state.   Neurological:  Negative for dizziness, seizures, syncope, facial asymmetry, speech difficulty, weakness, light-headedness, numbness and headaches.   Hematological:  Negative for adenopathy.   Psychiatric/Behavioral:  Negative for agitation, confusion and hallucinations.    Objective:     Vital Signs (Most Recent):  Temp: 97.7 °F (36.5 °C) (10/25/22 0532)  Pulse: (!) 112 (10/25/22 0532)  Resp: 20 (10/25/22 0532)  BP: 108/74 (10/25/22 0532)  SpO2: 100 % (10/25/22 0532)   Vital Signs (24h Range):  Temp:  [97.7 °F (36.5 °C)-99.3 °F (37.4 °C)] 97.7 °F (36.5 °C)  Pulse:  [112-115] 112  Resp:  [18-20] 20  SpO2:  [100 %] 100 %  BP: (100-108)/(67-79) 108/74     Weight: 59.2 kg (130 lb 8.2 oz)  Body mass index is 22.4 kg/m².    Physical  Exam  Vitals and nursing note reviewed.   Constitutional:       General: She is not in acute distress.     Appearance: She is well-developed. She is not diaphoretic.   HENT:      Head: Normocephalic and atraumatic.      Nose: Nose normal.   Eyes:      General: No scleral icterus.     Conjunctiva/sclera: Conjunctivae normal.   Neck:      Vascular: JVD present.      Trachea: No tracheal deviation.   Cardiovascular:      Rate and Rhythm: Normal rate and regular rhythm.      Heart sounds: Normal heart sounds. No murmur heard.    No friction rub. No gallop.   Pulmonary:      Effort: Pulmonary effort is normal. No respiratory distress.      Breath sounds: Normal breath sounds. No stridor. No wheezing or rales.      Comments: +conversational dyspnea  Diminished BS to left base  Chest:      Chest wall: No tenderness.   Abdominal:      General: Bowel sounds are normal. There is no distension.      Palpations: Abdomen is soft. There is no mass.      Tenderness: There is no abdominal tenderness. There is no guarding or rebound.   Musculoskeletal:         General: No tenderness or deformity. Normal range of motion.      Cervical back: Normal range of motion and neck supple.      Right lower leg: Edema (trace) present.      Left lower leg: Edema (trace) present.   Skin:     General: Skin is warm and dry.      Coloration: Skin is not pale.      Findings: No erythema or rash.   Neurological:      Mental Status: She is alert and oriented to person, place, and time.      Cranial Nerves: No cranial nerve deficit.      Motor: No abnormal muscle tone.      Coordination: Coordination normal.   Psychiatric:         Behavior: Behavior normal.         Thought Content: Thought content normal.           Significant Labs: All pertinent labs within the past 24 hours have been reviewed.    Significant Imaging: I have reviewed all pertinent imaging results/findings within the past 24 hours.

## 2022-10-25 NOTE — ASSESSMENT & PLAN NOTE
Patient is identified as having Combined Systolic and Diastolic heart failure that is Acute on chronic. CHF is currently uncontrolled due to Continued edema of extremities. Latest ECHO performed and demonstrates- Results for orders placed during the hospital encounter of 09/22/22    Echo    Interpretation Summary  · The left ventricle is severely enlarged with concentric hypertrophy and severely decreased systolic function.  · The estimated ejection fraction is 15%.  · Severe left atrial enlargement.  · Severe right atrial enlargement.  · Grade III left ventricular diastolic dysfunction.  · The estimated PA systolic pressure is 106 mmHg.  · Normal right ventricular size with mildly reduced right ventricular systolic function.  · There is pulmonary hypertension.  · Elevated central venous pressure (15 mmHg).  · Moderate to severe tricuspid regurgitation.  · There is severe left ventricular global hypokinesis.  · Moderate pulmonic regurgitation.  · Moderate mitral regurgitation.  · There is a small right pleural effusion.  . Continue Beta Blocker and monitor clinical status closely. Monitor on telemetry. Patient is off CHF pathway.  Monitor strict Is&Os and daily weights.  Place on fluid restriction of 1.5 L. Continue to stress to patient importance of self efficacy and  on diet for CHF. Last BNP reviewed- and noted below   Recent Labs   Lab 10/25/22  0022   BNP 3,458*   .  Add low dose entresto qhs  Continue with iv lasix

## 2022-10-25 NOTE — SUBJECTIVE & OBJECTIVE
Interval History:  Patient continue to experience CHF symptoms. Cardiology consulted.     Review of Systems   Constitutional:  Positive for activity change. Negative for appetite change, chills, diaphoresis, fatigue, fever and unexpected weight change.   HENT:  Negative for congestion, nosebleeds, sinus pressure and sore throat.    Eyes:  Negative for pain, discharge and visual disturbance.   Respiratory:  Positive for shortness of breath. Negative for cough, chest tightness, wheezing and stridor.    Cardiovascular:  Positive for leg swelling. Negative for chest pain and palpitations.   Gastrointestinal:  Positive for abdominal distention (bloating). Negative for abdominal pain, blood in stool, constipation, diarrhea, nausea and vomiting.   Endocrine: Negative for cold intolerance and heat intolerance.   Genitourinary:  Negative for difficulty urinating, dysuria, flank pain, frequency and urgency.   Musculoskeletal:  Positive for arthralgias and back pain. Negative for joint swelling, myalgias, neck pain and neck stiffness.   Skin:  Negative for rash and wound.   Allergic/Immunologic: Negative for food allergies and immunocompromised state.   Neurological:  Positive for weakness. Negative for dizziness, seizures, syncope, facial asymmetry, speech difficulty, light-headedness, numbness and headaches.   Hematological:  Negative for adenopathy.   Psychiatric/Behavioral:  Negative for agitation, confusion and hallucinations.    Objective:     Vital Signs (Most Recent):  Temp: 98.2 °F (36.8 °C) (10/25/22 1003)  Pulse: 98 (10/25/22 1003)  Resp: 16 (10/25/22 1026)  BP: (!) 109/57 (10/25/22 1003)  SpO2: 100 % (10/25/22 1003)   Vital Signs (24h Range):  Temp:  [97.7 °F (36.5 °C)-99.3 °F (37.4 °C)] 98.2 °F (36.8 °C)  Pulse:  [] 98  Resp:  [16-20] 16  SpO2:  [99 %-100 %] 100 %  BP: (100-109)/(57-79) 109/57     Weight: 59.2 kg (130 lb 8.2 oz)  Body mass index is 22.4 kg/m².    Intake/Output Summary (Last 24 hours) at  10/25/2022 1333  Last data filed at 10/25/2022 1200  Gross per 24 hour   Intake --   Output 1 ml   Net -1 ml      Physical Exam  Vitals and nursing note reviewed.   Constitutional:       General: She is not in acute distress.     Appearance: She is well-developed. She is not diaphoretic.   HENT:      Head: Normocephalic and atraumatic.      Nose: Nose normal.   Eyes:      General: No scleral icterus.     Conjunctiva/sclera: Conjunctivae normal.   Neck:      Vascular: JVD present.      Trachea: No tracheal deviation.   Cardiovascular:      Rate and Rhythm: Normal rate and regular rhythm.      Heart sounds: Normal heart sounds. No murmur heard.    No friction rub. No gallop.   Pulmonary:      Effort: Pulmonary effort is normal. No respiratory distress.      Breath sounds: No stridor. Examination of the right-lower field reveals rales. Examination of the left-lower field reveals rales. Rales present. No wheezing.      Comments: +conversational dyspnea    Chest:      Chest wall: No tenderness.   Abdominal:      General: Bowel sounds are normal. There is no distension.      Palpations: Abdomen is soft. There is no mass.      Tenderness: There is no abdominal tenderness. There is no guarding or rebound.   Musculoskeletal:         General: No tenderness or deformity. Normal range of motion.      Cervical back: Normal range of motion and neck supple.      Right lower leg: Edema (trace) present.      Left lower leg: Edema (trace) present.   Skin:     General: Skin is warm and dry.      Coloration: Skin is not pale.      Findings: No erythema or rash.   Neurological:      Mental Status: She is alert and oriented to person, place, and time.      Cranial Nerves: No cranial nerve deficit.      Motor: No abnormal muscle tone.      Coordination: Coordination normal.   Psychiatric:         Behavior: Behavior normal.         Thought Content: Thought content normal.       Significant Labs: All pertinent labs within the past 24 hours  have been reviewed.  CBC:   Recent Labs   Lab 10/25/22  0022   WBC 5.80   HGB 7.1*   HCT 24.9*        CMP:   Recent Labs   Lab 10/25/22  0022   *   K 4.1      CO2 19*   GLU 72   BUN 26*   CREATININE 0.9   CALCIUM 8.8   PROT 6.6   ALBUMIN 2.8*   BILITOT 2.6*   ALKPHOS 71   AST 27   ALT 16   ANIONGAP 13     Magnesium:   Recent Labs   Lab 10/25/22  0548   MG 1.7     Troponin:   Recent Labs   Lab 10/25/22  0022 10/25/22  0548 10/25/22  1208   TROPONINI 0.028* 0.025 0.040*     TSH:   Recent Labs   Lab 10/25/22  0548   TSH 1.353     Urine Studies:   Recent Labs   Lab 10/25/22  0500   COLORU Yellow   APPEARANCEUA Clear   PHUR 6.0   SPECGRAV 1.005   PROTEINUA Negative   GLUCUA Negative   KETONESU Negative   BILIRUBINUA Negative   OCCULTUA Negative   NITRITE Negative   UROBILINOGEN Negative   LEUKOCYTESUR Trace*   WBCUA 0   BACTERIA Rare   HYALINECASTS 1       Significant Imaging:

## 2022-10-25 NOTE — PLAN OF CARE
Nutrition Recommendations 10/25:  1. Recommend diet advance to Cardic (Low Na/Chol) diet, 1500 mL fluid restriction   2. Recommend boost plus BID to fill any nutritional gaps  3.Collaboration of care with medical providers  Franklin Rowlanditian

## 2022-10-25 NOTE — CONSULTS
"  [facility] - Tele[facility] ([facility])  Adult Nutrition  Consult Note    SUMMARY     Recommendations    Recommendation/Intervention:   1. Recommend diet advance to Cardic (Low Na/Chol) diet, 1500 mL fluid restriction   2. Recommend boost plus BID to fill any nutritional gaps  Goals:   1. Pt will consume 75% EEN by RD follow-up  Nutrition Goal Status: new  Communication of RD Recs: other (comment), (POC, sticky note)    Assessment and Plan    Nutrition Problem  Inadequate energy intake    Related to (etiology):   Decreased ability to consume sufficient protein and/or energy    Signs and Symptoms (as evidenced by):   0-25% PO intake    Interventions(treatment strategy):  1. Recommend diet advance to Cardi (Low Na/Chol) diet, 1500 mL fluid restriction   2. 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 Pt reports having a good appetite but comes and goes, stated 0% PO intake today d/t pt not feeling well c/o gas pain, pt not experiencing any N/V/D, denies chewing/swallowing difficulties.Pt receptive to boost oral nutrtion supplements to fill nutritional gaps when not feeling well enough to consume PO intake, NFPE not appropriate at this time. Provided pt with "Fluid Restricted diet and Low Sodium Nutrition Therapy" nutrition education from [facility]. Pt expressed understanding, did not have any questions, provided RD information for further questions. Current diet order includes diabetic, " "confirmed with RN via secure chat that pt reported to never been diagnosed with diabetes. Skin WDL. Lukasz score 23. No edema. Charted gastrointestional "WDL; except, appearance/characteristics". Pt stated wt 136 lbs PTA, charted weight 9/30/22 115 lbs, 21 lb wt gain in < 1 month. Re-weight warranted for accuracy. LBM 10/24. RD will continue to monitor.  Nutrition Discharge Planning: Cardiac diet    Nutrition Risk Screen    Nutrition Risk Screen: no indicators present    Nutrition/Diet History    Food Allergies: NKFA  Factors Affecting Nutritional Intake: abdominal distension, decreased appetite    Anthropometrics    Temp: 98.2 °F (36.8 °C)  Height Method: Stated  Height: 5' 4" (162.6 cm)  Height (inches): 64 in  Weight Method: Bed Scale  Weight: 59.2 kg (130 lb 8.2 oz)  Weight (lb): 130.51 lb  Ideal Body Weight (IBW), Female: 120 lb  % Ideal Body Weight, Female (lb): 108.76 %  BMI (Calculated): 22.4  BMI Grade: 18.5-24.9 - normal     Wt Readings from Last 15 Encounters:   10/25/22 59.2 kg (130 lb 8.2 oz)   10/11/22 56.8 kg (125 lb 3.5 oz)   10/07/22 55.1 kg (121 lb 7.6 oz)   09/30/22 52.3 kg (115 lb 4.8 oz)     Lab/Procedures/Meds    Pertinent Labs Reviewed: reviewed  Pertinent Medications Reviewed: reviewed  BMP  Lab Results   Component Value Date     (L) 10/25/2022    K 4.1 10/25/2022     10/25/2022    CO2 19 (L) 10/25/2022    BUN 26 (H) 10/25/2022    CREATININE 0.9 10/25/2022    CALCIUM 8.8 10/25/2022    ANIONGAP 13 10/25/2022    Scheduled Meds:   apixaban  2.5 mg Oral BID    aspirin  81 mg Oral Daily    atorvastatin  40 mg Oral Daily    folic acid  1 mg Oral Daily    furosemide (LASIX) injection  40 mg Intravenous Q12H    metoprolol succinate  25 mg Oral Nightly    polyethylene glycol  17 g Oral Daily    QUEtiapine  100 mg Oral QHS    senna-docusate 8.6-50 mg  1 tablet Oral BID     Continuous Infusions:  PRN Meds:.ondansetron, oxyCODONE-acetaminophen, sodium chloride 0.9%    Physical " Findings/Assessment         Estimated/Assessed Needs    Weight Used For Calorie Calculations: 59.2 kg (130 lb 8.2 oz)  Energy Calorie Requirements (kcal): 9115-0030 (22-24kcal/kg ABW (CHF))  Energy Need Method: Kcal/kg  Protein Requirements: 65-83 (1.1-1.4 g/kg (CHF))  Weight Used For Protein Calculations: 59.2 kg (130 lb 8.2 oz)  Fluid Requirements (mL): 1500 (Fluid restriction)     RDA Method (mL): 1302  CHO Requirement: 162-177 (6695-5596 kcal/8)      Nutrition Prescription Ordered    Current Diet Order: Diabetic/Cardic diet, 1500 mL fluid restriction    Evaluation of Received Nutrient/Fluid Intake  I/O: (Net since admit)  10/25: -1 mL   Energy Calories Required: not meeting needs  Protein Required: not meeting needs  Fluid Required: meeting needs  % Intake of Estimated Energy Needs: 0 - 25 %  % Meal Intake: 0 - 25 %    Nutrition Risk    Level of Risk/Frequency of Follow-up: high (F/u 2 x weekly)       Monitor and Evaluation    Food and Nutrient Intake: food and beverage intake  Food and Nutrient Adminstration: diet order  Knowledge/Beliefs/Attitudes: food and nutrition knowledge/skill, beliefs and attitudes  Anthropometric Measurements: weight, weight change, body mass index  Biochemical Data, Medical Tests and Procedures: electrolyte and renal panel, gastrointestinal profile, glucose/endocrine profile, inflammatory profile, lipid profile  Nutrition-Focused Physical Findings: overall appearance       Nutrition Follow-Up  RD follow-up?: Yes  Joanna Mack Dietitian

## 2022-10-25 NOTE — HPI
37-year-old  female with Systolic CHF EF 15%, chronic cocaine user, right lower extremity DVT s/p thrombectomy, Bipolar d/o who presented to ED with SOB, leg swelling and bloating - onset one week ago. Pt denies chest pain. Denies recent cocaine use.      Ed work up showed sinus tachycardia- no ischemic changes. Hgb 7.1(baseline), BNP 3458, Troponin 0.028. CXR showed Cardiomegaly, Mild left-sided effusion.  Pt was given IV lasix 80mg. 1 liter UOP noted     Denies any recent cocaine use , no chest pain   States mild improvement since lasix, complained of bloated stomach

## 2022-10-25 NOTE — HPI
37-year-old  female with Systolic CHF EF 15%, chronic cocaine user, right lower extremity DVT s/p thrombectomy, Bipolar d/o who presented to ED with SOB, leg swelling and bloating - onset one week ago. Pt denies chest pain. Denies recent cocaine use.     Ed work up showed sinus tachycardia- no ischemic changes. Hgb 7.1(baseline), BNP 3458, Troponin 0.028. CXR showed Cardiomegaly, Mild left-sided effusion.  Pt was given IV lasix 80mg. 1 liter UOP noted       The patient will be placed in observation for CHF exacerbation

## 2022-10-25 NOTE — H&P
Ventilator Daily Summary    Vent Day # 2    7.5 @ 22    CMV 22, 350, +8, 30%    Ventilator settings changed this shift: Initial settings adjusted    Weaning trials: None    Respiratory Procedures: ABGs    Plan: Continue current ventilator settings and wean mechanical ventilation as tolerated per physician orders.       [facility]  History & Physical    Patient Name: Teresa Echols  MRN: [id number]  Patient Class: OP- Observation  Admission Date: 10/24/2022  Attending Physician: Dr. Franklin   Primary Care Provider: Juan Contreras MD         Patient information was obtained from patient and ER records.     Subjective:     Principal Problem:Acute on chronic systolic congestive heart failure    Chief Complaint:   Chief Complaint   Patient presents with    Shortness of Breath     Pt brought in by [facility] for SOB and cough x5  days and fluid in abd. PMHx of CHF        HPI: 37-year-old  female with Systolic CHF EF 15%, chronic cocaine user, right lower extremity DVT s/p thrombectomy, Bipolar d/o who presented to ED with SOB, leg swelling and bloating - onset one week ago. Pt denies chest pain. Denies recent cocaine use.     Ed work up showed sinus tachycardia- no ischemic changes. Hgb 7.1(baseline), BNP 3458, Troponin 0.028. CXR showed Cardiomegaly, Mild left-sided effusion.  Pt was given IV lasix 80mg. 1 liter UOP noted       The patient will be placed in observation for CHF exacerbation       Past Medical History:   Diagnosis Date    Bipolar disorder, unspecified     CHF (congestive heart failure)     Cocaine abuse     DVT (deep venous thrombosis)     Hypertension        Past Surgical History:   Procedure Laterality Date    INSERTION OF INFERIOR VENA CAVAL FILTER N/A 9/28/2022    Procedure: INSERTION, FILTER, INFERIOR VENA CAVA;  Surgeon: Clarence Alicea MD;  Location: [facility] CATH LAB;  Service: General;  Laterality: N/A;       Review of patient's allergies indicates:  No Known Allergies    No current facility-administered medications on file prior to encounter.     Current Outpatient Medications on File Prior to Encounter   Medication Sig    acetaminophen (TYLENOL) 325 MG tablet Take 2 tablets (650 mg total) by mouth every 6 (six) hours as needed for Pain.    apixaban  (ELIQUIS) 2.5 mg Tab Take 1 tablet (2.5 mg total) by mouth 2 (two) times daily.    ascorbic acid, vitamin C, (VITAMIN C) 500 MG tablet Take 1 tablet (500 mg total) by mouth every evening.    atorvastatin (LIPITOR) 40 MG tablet Take 40 mg by mouth once daily.    benzonatate (TESSALON) 200 MG capsule Take by mouth.    ferrous sulfate 324 mg (65 mg iron) TbEC Take 1 tablet (324 mg total) by mouth 2 (two) times daily with meals.    folic acid (FOLVITE) 1 MG tablet Take 1 tablet (1 mg total) by mouth once daily.    furosemide (LASIX) 40 MG tablet Take 40 mg by mouth once daily.    JARDIANCE 10 mg tablet Take 10 mg by mouth once daily.    metoprolol succinate (TOPROL-XL) 25 MG 24 hr tablet Take 1 tablet (25 mg total) by mouth nightly.    multivitamin Tab Take 1 tablet by mouth once daily.    potassium chloride SA (K-DUR,KLOR-CON M) 10 MEQ tablet Take 1 tablet by mouth once daily.    QUEtiapine (SEROQUEL) 100 MG Tab Take 1 tablet (100 mg total) by mouth every evening.     Family History       Problem Relation (Age of Onset)    Hypertension Mother, Father          Tobacco Use    Smoking status: Every Day     Types: Cigarettes    Smokeless tobacco: Never   Substance and Sexual Activity    Alcohol use: Yes    Drug use: Yes     Types: Cocaine    Sexual activity: Not on file     Review of Systems   Constitutional:  Positive for activity change. Negative for appetite change, chills, diaphoresis, fatigue, fever and unexpected weight change.   HENT:  Negative for congestion, nosebleeds, sinus pressure and sore throat.    Eyes:  Negative for pain, discharge and visual disturbance.   Respiratory:  Positive for shortness of breath. Negative for cough, chest tightness, wheezing and stridor.    Cardiovascular:  Positive for leg swelling. Negative for chest pain and palpitations.   Gastrointestinal:  Positive for abdominal distention (bloating). Negative for abdominal pain, blood in stool, constipation, diarrhea, nausea  and vomiting.   Endocrine: Negative for cold intolerance and heat intolerance.   Genitourinary:  Negative for difficulty urinating, dysuria, flank pain, frequency and urgency.   Musculoskeletal:  Positive for arthralgias and back pain. Negative for joint swelling, myalgias, neck pain and neck stiffness.   Skin:  Negative for rash and wound.   Allergic/Immunologic: Negative for food allergies and immunocompromised state.   Neurological:  Negative for dizziness, seizures, syncope, facial asymmetry, speech difficulty, weakness, light-headedness, numbness and headaches.   Hematological:  Negative for adenopathy.   Psychiatric/Behavioral:  Negative for agitation, confusion and hallucinations.    Objective:     Vital Signs (Most Recent):  Temp: 97.7 °F (36.5 °C) (10/25/22 0532)  Pulse: (!) 112 (10/25/22 0532)  Resp: 20 (10/25/22 0532)  BP: 108/74 (10/25/22 0532)  SpO2: 100 % (10/25/22 0532)   Vital Signs (24h Range):  Temp:  [97.7 °F (36.5 °C)-99.3 °F (37.4 °C)] 97.7 °F (36.5 °C)  Pulse:  [112-115] 112  Resp:  [18-20] 20  SpO2:  [100 %] 100 %  BP: (100-108)/(67-79) 108/74     Weight: 59.2 kg (130 lb 8.2 oz)  Body mass index is 22.4 kg/m².    Physical Exam  Vitals and nursing note reviewed.   Constitutional:       General: She is not in acute distress.     Appearance: She is well-developed. She is not diaphoretic.   HENT:      Head: Normocephalic and atraumatic.      Nose: Nose normal.   Eyes:      General: No scleral icterus.     Conjunctiva/sclera: Conjunctivae normal.   Neck:      Vascular: JVD present.      Trachea: No tracheal deviation.   Cardiovascular:      Rate and Rhythm: Normal rate and regular rhythm.      Heart sounds: Normal heart sounds. No murmur heard.    No friction rub. No gallop.   Pulmonary:      Effort: Pulmonary effort is normal. No respiratory distress.      Breath sounds: Normal breath sounds. No stridor. No wheezing or rales.      Comments: +conversational dyspnea  Diminished BS to left  base  Chest:      Chest wall: No tenderness.   Abdominal:      General: Bowel sounds are normal. There is no distension.      Palpations: Abdomen is soft. There is no mass.      Tenderness: There is no abdominal tenderness. There is no guarding or rebound.   Musculoskeletal:         General: No tenderness or deformity. Normal range of motion.      Cervical back: Normal range of motion and neck supple.      Right lower leg: Edema (trace) present.      Left lower leg: Edema (trace) present.   Skin:     General: Skin is warm and dry.      Coloration: Skin is not pale.      Findings: No erythema or rash.   Neurological:      Mental Status: She is alert and oriented to person, place, and time.      Cranial Nerves: No cranial nerve deficit.      Motor: No abnormal muscle tone.      Coordination: Coordination normal.   Psychiatric:         Behavior: Behavior normal.         Thought Content: Thought content normal.           Significant Labs: All pertinent labs within the past 24 hours have been reviewed.    Significant Imaging: I have reviewed all pertinent imaging results/findings within the past 24 hours.    Assessment/Plan:     * Acute on chronic systolic congestive heart failure  Patient is identified as having Combined Systolic and Diastolic heart failure that is Acute on chronic. CHF is currently uncontrolled due to Pulmonary edema/pleural effusion on CXR. Latest ECHO performed and demonstrates- Results for orders placed during the hospital encounter of 09/22/22    Echo    Interpretation Summary  · The left ventricle is severely enlarged with concentric hypertrophy and severely decreased systolic function.  · The estimated ejection fraction is 15%.  · Severe left atrial enlargement.  · Severe right atrial enlargement.  · Grade III left ventricular diastolic dysfunction.  · The estimated PA systolic pressure is 106 mmHg.  · Normal right ventricular size with mildly reduced right ventricular systolic function.  · There  is pulmonary hypertension.  · Elevated central venous pressure (15 mmHg).  · Moderate to severe tricuspid regurgitation.  · There is severe left ventricular global hypokinesis.  · Moderate pulmonic regurgitation.  · Moderate mitral regurgitation.  · There is a small right pleural effusion.  . Continue Beta Blocker, Furosemide and ARNI and monitor clinical status closely. Monitor on telemetry. Patient is on CHF pathway.  Monitor strict Is&Os and daily weights.  Place on fluid restriction of 1.5 L. Continue to stress to patient importance of self efficacy and  on diet for CHF. Last BNP reviewed- and noted below   Recent Labs   Lab 10/25/22  0022   BNP 3,458*   .          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

## 2022-10-25 NOTE — HOSPITAL COURSE
Ms Echols is a 37 year old female who presented to [facility] for evaluation of shortness of breath. Associated symptoms include bilateral leg swelling and left side abdominal bloating. Denies associated symoptom of chest pain, fever, chills, nausea or vomiting. BNP elevated at 3458 on admission. Cardiology consulted for further evaluation. As of 10/26/2022, patient repots shortness of breath and orthopnea. She also noted to have some hypotension this morning. Cardiology following. As of 10/27/2022, patient feeling well, blood pressure on the low side but stable. Case reviewed with Cardiology, ok to discharge for from their standpoint. Will have close follow up in CHF Clinic. Iron level low, may be contributing to anemia. Venofer IV daily X 2 given. She was seen, examined and deemed stable for discharge.

## 2022-10-25 NOTE — SUBJECTIVE & OBJECTIVE
Interval History:  Patient continue to experience CHF symptoms. Cardiology consulted.     Review of Systems   Constitutional:  Positive for activity change. Negative for appetite change, chills, diaphoresis, fatigue, fever and unexpected weight change.   HENT:  Negative for congestion, nosebleeds, sinus pressure and sore throat.    Eyes:  Negative for pain, discharge and visual disturbance.   Respiratory:  Positive for shortness of breath. Negative for cough, chest tightness, wheezing and stridor.    Cardiovascular:  Positive for leg swelling. Negative for chest pain and palpitations.   Gastrointestinal:  Positive for abdominal distention (bloating). Negative for abdominal pain, blood in stool, constipation, diarrhea, nausea and vomiting.   Endocrine: Negative for cold intolerance and heat intolerance.   Genitourinary:  Negative for difficulty urinating, dysuria, flank pain, frequency and urgency.   Musculoskeletal:  Positive for arthralgias and back pain. Negative for joint swelling, myalgias, neck pain and neck stiffness.   Skin:  Negative for rash and wound.   Allergic/Immunologic: Negative for food allergies and immunocompromised state.   Neurological:  Positive for weakness. Negative for dizziness, seizures, syncope, facial asymmetry, speech difficulty, light-headedness, numbness and headaches.   Hematological:  Negative for adenopathy.   Psychiatric/Behavioral:  Negative for agitation, confusion and hallucinations.    Objective:     Vital Signs (Most Recent):  Temp: 97.9 °F (36.6 °C) (10/25/22 1522)  Pulse: 90 (10/25/22 1522)  Resp: 19 (10/25/22 1522)  BP: 105/67 (10/25/22 1522)  SpO2: 99 % (10/25/22 1522)   Vital Signs (24h Range):  Temp:  [97.7 °F (36.5 °C)-99.3 °F (37.4 °C)] 97.9 °F (36.6 °C)  Pulse:  [] 90  Resp:  [16-20] 19  SpO2:  [99 %-100 %] 99 %  BP: (100-109)/(57-79) 105/67     Weight: 59.2 kg (130 lb 8.2 oz)  Body mass index is 22.4 kg/m².    Intake/Output Summary (Last 24 hours) at 10/25/2022  1523  Last data filed at 10/25/2022 1200  Gross per 24 hour   Intake --   Output 1 ml   Net -1 ml        Physical Exam  Vitals and nursing note reviewed.   Constitutional:       General: She is not in acute distress.     Appearance: She is well-developed. She is not diaphoretic.   HENT:      Head: Normocephalic and atraumatic.      Nose: Nose normal.   Eyes:      General: No scleral icterus.     Conjunctiva/sclera: Conjunctivae normal.   Neck:      Vascular: JVD present.      Trachea: No tracheal deviation.   Cardiovascular:      Rate and Rhythm: Normal rate and regular rhythm.      Heart sounds: Normal heart sounds. No murmur heard.    No friction rub. No gallop.   Pulmonary:      Effort: Pulmonary effort is normal. No respiratory distress.      Breath sounds: No stridor. Examination of the right-lower field reveals rales. Examination of the left-lower field reveals rales. Rales present. No wheezing.      Comments: +conversational dyspnea    Chest:      Chest wall: No tenderness.   Abdominal:      General: Bowel sounds are normal. There is no distension.      Palpations: Abdomen is soft. There is no mass.      Tenderness: There is no abdominal tenderness. There is no guarding or rebound.   Musculoskeletal:         General: No tenderness or deformity. Normal range of motion.      Cervical back: Normal range of motion and neck supple.      Right lower leg: Edema (trace) present.      Left lower leg: Edema (trace) present.   Skin:     General: Skin is warm and dry.      Coloration: Skin is not pale.      Findings: No erythema or rash.   Neurological:      Mental Status: She is alert and oriented to person, place, and time.      Cranial Nerves: No cranial nerve deficit.      Motor: No abnormal muscle tone.      Coordination: Coordination normal.   Psychiatric:         Behavior: Behavior normal.         Thought Content: Thought content normal.       Significant Labs: All pertinent labs within the past 24 hours have been  reviewed.  CBC:   Recent Labs   Lab 10/25/22  0022   WBC 5.80   HGB 7.1*   HCT 24.9*          CMP:   Recent Labs   Lab 10/25/22  0022   *   K 4.1      CO2 19*   GLU 72   BUN 26*   CREATININE 0.9   CALCIUM 8.8   PROT 6.6   ALBUMIN 2.8*   BILITOT 2.6*   ALKPHOS 71   AST 27   ALT 16   ANIONGAP 13       Magnesium:   Recent Labs   Lab 10/25/22  0548   MG 1.7       Troponin:   Recent Labs   Lab 10/25/22  0022 10/25/22  0548 10/25/22  1208   TROPONINI 0.028* 0.025 0.040*       TSH:   Recent Labs   Lab 10/25/22  0548   TSH 1.353       Urine Studies:   Recent Labs   Lab 10/25/22  0500   COLORU Yellow   APPEARANCEUA Clear   PHUR 6.0   SPECGRAV 1.005   PROTEINUA Negative   GLUCUA Negative   KETONESU Negative   BILIRUBINUA Negative   OCCULTUA Negative   NITRITE Negative   UROBILINOGEN Negative   LEUKOCYTESUR Trace*   WBCUA 0   BACTERIA Rare   HYALINECASTS 1         Significant Imaging:   Results for orders placed during the hospital encounter of 09/22/22    Echo    Interpretation Summary  · The left ventricle is severely enlarged with concentric hypertrophy and severely decreased systolic function.  · The estimated ejection fraction is 15%.  · Severe left atrial enlargement.  · Severe right atrial enlargement.  · Grade III left ventricular diastolic dysfunction.  · The estimated PA systolic pressure is 106 mmHg.  · Normal right ventricular size with mildly reduced right ventricular systolic function.  · There is pulmonary hypertension.  · Elevated central venous pressure (15 mmHg).  · Moderate to severe tricuspid regurgitation.  · There is severe left ventricular global hypokinesis.  · Moderate pulmonic regurgitation.  · Moderate mitral regurgitation.  · There is a small right pleural effusion.

## 2022-10-25 NOTE — ASSESSMENT & PLAN NOTE
Patient is identified as having Combined Systolic and Diastolic heart failure that is Acute on chronic. CHF is currently uncontrolled due to Pulmonary edema/pleural effusion on CXR. Latest ECHO performed and demonstrates- Results for orders placed during the hospital encounter of 09/22/22    Echo    Interpretation Summary  · The left ventricle is severely enlarged with concentric hypertrophy and severely decreased systolic function.  · The estimated ejection fraction is 15%.  · Severe left atrial enlargement.  · Severe right atrial enlargement.  · Grade III left ventricular diastolic dysfunction.  · The estimated PA systolic pressure is 106 mmHg.  · Normal right ventricular size with mildly reduced right ventricular systolic function.  · There is pulmonary hypertension.  · Elevated central venous pressure (15 mmHg).  · Moderate to severe tricuspid regurgitation.  · There is severe left ventricular global hypokinesis.  · Moderate pulmonic regurgitation.  · Moderate mitral regurgitation.  · There is a small right pleural effusion.  . Continue Beta Blocker, Furosemide and ARNI and monitor clinical status closely. Monitor on telemetry. Patient is on CHF pathway.  Monitor strict Is&Os and daily weights.  Place on fluid restriction of 1.5 L. Continue to stress to patient importance of self efficacy and  on diet for CHF. Last BNP reviewed- and noted below   Recent Labs   Lab 10/25/22  0022   BNP 3,458*   .    Strict I & O   Daily weights   Fluid restriction   Consult cardiology

## 2022-10-25 NOTE — CONSULTS
[facility] ([facility])  Cardiology  Consult Note    Patient Name: Teresa Echols  MRN: [id number]  Admission Date: 10/24/2022  Hospital Length of Stay: 0 days  Code Status: Full Code   Attending Provider: Edson Morales MD   Consulting Provider: Arnaldo Lundberg MD  Primary Care Physician: Jaun Contreras MD  Principal Problem:Acute on chronic systolic congestive heart failure    Patient information was obtained from patient, past medical records and ER records.     Inpatient consult to Cardiology  Consult performed by: Arnaldo Lundberg MD  Consult ordered by: Pual Gray NP  Reason for consult: ADHF        Subjective:     Chief Complaint:  ADHF     HPI:   37-year-old  female with Systolic CHF EF 15%, chronic cocaine user, right lower extremity DVT s/p thrombectomy, Bipolar d/o who presented to ED with SOB, leg swelling and bloating - onset one week ago. Pt denies chest pain. Denies recent cocaine use.      Ed work up showed sinus tachycardia- no ischemic changes. Hgb 7.1(baseline), BNP 3458, Troponin 0.028. CXR showed Cardiomegaly, Mild left-sided effusion.  Pt was given IV lasix 80mg. 1 liter UOP noted     Denies any recent cocaine use , no chest pain   States mild improvement since lasix, complained of bloated stomach       Interval History:  Patient continue to experience CHF symptoms. Cardiology consulted.     Review of Systems   Constitutional:  Positive for activity change. Negative for appetite change, chills, diaphoresis, fatigue, fever and unexpected weight change.   HENT:  Negative for congestion, nosebleeds, sinus pressure and sore throat.    Eyes:  Negative for pain, discharge and visual disturbance.   Respiratory:  Positive for shortness of breath. Negative for cough, chest tightness, wheezing and stridor.    Cardiovascular:  Positive for leg swelling. Negative for chest pain and palpitations.   Gastrointestinal:  Positive for abdominal distention (bloating). Negative  for abdominal pain, blood in stool, constipation, diarrhea, nausea and vomiting.   Endocrine: Negative for cold intolerance and heat intolerance.   Genitourinary:  Negative for difficulty urinating, dysuria, flank pain, frequency and urgency.   Musculoskeletal:  Positive for arthralgias and back pain. Negative for joint swelling, myalgias, neck pain and neck stiffness.   Skin:  Negative for rash and wound.   Allergic/Immunologic: Negative for food allergies and immunocompromised state.   Neurological:  Positive for weakness. Negative for dizziness, seizures, syncope, facial asymmetry, speech difficulty, light-headedness, numbness and headaches.   Hematological:  Negative for adenopathy.   Psychiatric/Behavioral:  Negative for agitation, confusion and hallucinations.    Objective:     Vital Signs (Most Recent):  Temp: 97.9 °F (36.6 °C) (10/25/22 1522)  Pulse: 90 (10/25/22 1522)  Resp: 19 (10/25/22 1522)  BP: 105/67 (10/25/22 1522)  SpO2: 99 % (10/25/22 1522)   Vital Signs (24h Range):  Temp:  [97.7 °F (36.5 °C)-99.3 °F (37.4 °C)] 97.9 °F (36.6 °C)  Pulse:  [] 90  Resp:  [16-20] 19  SpO2:  [99 %-100 %] 99 %  BP: (100-109)/(57-79) 105/67     Weight: 59.2 kg (130 lb 8.2 oz)  Body mass index is 22.4 kg/m².    Intake/Output Summary (Last 24 hours) at 10/25/2022 1523  Last data filed at 10/25/2022 1200  Gross per 24 hour   Intake --   Output 1 ml   Net -1 ml        Physical Exam  Vitals and nursing note reviewed.   Constitutional:       General: She is not in acute distress.     Appearance: She is well-developed. She is not diaphoretic.   HENT:      Head: Normocephalic and atraumatic.      Nose: Nose normal.   Eyes:      General: No scleral icterus.     Conjunctiva/sclera: Conjunctivae normal.   Neck:      Vascular: JVD present.      Trachea: No tracheal deviation.   Cardiovascular:      Rate and Rhythm: Normal rate and regular rhythm.      Heart sounds: Normal heart sounds. No murmur heard.    No friction rub. No  gallop.   Pulmonary:      Effort: Pulmonary effort is normal. No respiratory distress.      Breath sounds: No stridor. Examination of the right-lower field reveals rales. Examination of the left-lower field reveals rales. Rales present. No wheezing.      Comments: +conversational dyspnea    Chest:      Chest wall: No tenderness.   Abdominal:      General: Bowel sounds are normal. There is no distension.      Palpations: Abdomen is soft. There is no mass.      Tenderness: There is no abdominal tenderness. There is no guarding or rebound.   Musculoskeletal:         General: No tenderness or deformity. Normal range of motion.      Cervical back: Normal range of motion and neck supple.      Right lower leg: Edema (trace) present.      Left lower leg: Edema (trace) present.   Skin:     General: Skin is warm and dry.      Coloration: Skin is not pale.      Findings: No erythema or rash.   Neurological:      Mental Status: She is alert and oriented to person, place, and time.      Cranial Nerves: No cranial nerve deficit.      Motor: No abnormal muscle tone.      Coordination: Coordination normal.   Psychiatric:         Behavior: Behavior normal.         Thought Content: Thought content normal.       Significant Labs: All pertinent labs within the past 24 hours have been reviewed.  CBC:   Recent Labs   Lab 10/25/22  0022   WBC 5.80   HGB 7.1*   HCT 24.9*          CMP:   Recent Labs   Lab 10/25/22  0022   *   K 4.1      CO2 19*   GLU 72   BUN 26*   CREATININE 0.9   CALCIUM 8.8   PROT 6.6   ALBUMIN 2.8*   BILITOT 2.6*   ALKPHOS 71   AST 27   ALT 16   ANIONGAP 13       Magnesium:   Recent Labs   Lab 10/25/22  0548   MG 1.7       Troponin:   Recent Labs   Lab 10/25/22  0022 10/25/22  0548 10/25/22  1208   TROPONINI 0.028* 0.025 0.040*       TSH:   Recent Labs   Lab 10/25/22  0548   TSH 1.353       Urine Studies:   Recent Labs   Lab 10/25/22  0500   COLORU Yellow   APPEARANCEUA Clear   PHUR 6.0   SPECGRAV  1.005   PROTEINUA Negative   GLUCUA Negative   KETONESU Negative   BILIRUBINUA Negative   OCCULTUA Negative   NITRITE Negative   UROBILINOGEN Negative   LEUKOCYTESUR Trace*   WBCUA 0   BACTERIA Rare   HYALINECASTS 1         Significant Imaging:   Results for orders placed during the hospital encounter of 09/22/22    Echo    Interpretation Summary  · The left ventricle is severely enlarged with concentric hypertrophy and severely decreased systolic function.  · The estimated ejection fraction is 15%.  · Severe left atrial enlargement.  · Severe right atrial enlargement.  · Grade III left ventricular diastolic dysfunction.  · The estimated PA systolic pressure is 106 mmHg.  · Normal right ventricular size with mildly reduced right ventricular systolic function.  · There is pulmonary hypertension.  · Elevated central venous pressure (15 mmHg).  · Moderate to severe tricuspid regurgitation.  · There is severe left ventricular global hypokinesis.  · Moderate pulmonic regurgitation.  · Moderate mitral regurgitation.  · There is a small right pleural effusion.        Assessment and Plan:     * Acute on chronic systolic congestive heart failure  Patient is identified as having Combined Systolic and Diastolic heart failure that is Acute on chronic. CHF is currently uncontrolled due to Continued edema of extremities. Latest ECHO performed and demonstrates- Results for orders placed during the hospital encounter of 09/22/22    Echo    Interpretation Summary  · The left ventricle is severely enlarged with concentric hypertrophy and severely decreased systolic function.  · The estimated ejection fraction is 15%.  · Severe left atrial enlargement.  · Severe right atrial enlargement.  · Grade III left ventricular diastolic dysfunction.  · The estimated PA systolic pressure is 106 mmHg.  · Normal right ventricular size with mildly reduced right ventricular systolic function.  · There is pulmonary hypertension.  · Elevated central  venous pressure (15 mmHg).  · Moderate to severe tricuspid regurgitation.  · There is severe left ventricular global hypokinesis.  · Moderate pulmonic regurgitation.  · Moderate mitral regurgitation.  · There is a small right pleural effusion.  . Continue Beta Blocker and monitor clinical status closely. Monitor on telemetry. Patient is off CHF pathway.  Monitor strict Is&Os and daily weights.  Place on fluid restriction of 1.5 L. Continue to stress to patient importance of self efficacy and  on diet for CHF. Last BNP reviewed- and noted below   Recent Labs   Lab 10/25/22  0022   BNP 3,458*   .  Add low dose entresto qhs  Continue with iv lasix     Elevated troponin  Secondary to ADHF    Cocaine abuse  Denies any recent use   Check UDS        VTE Risk Mitigation (From admission, onward)         Ordered     apixaban tablet 2.5 mg  2 times daily         10/25/22 0418     IP VTE HIGH RISK PATIENT  Once         10/25/22 0418     Place sequential compression device  Until discontinued         10/25/22 0418                Thank you for your consult. I will follow-up with patient. Please contact us if you have any additional questions.    Arnaldo Lundberg MD  Cardiology   [facility] - [facility] ([facility])

## 2022-10-25 NOTE — ED PROVIDER NOTES
SCRIBE #1 NOTE: I, Abdoulaye Shama, am scribing for, and in the presence of, Alexander Kauffman Jr., MD. I have scribed the entire note.       History     Chief Complaint   Patient presents with    Shortness of Breath     [facility] and [facility]5  days and fluid in abd. PMHx of CHF     Review of patient's allergies indicates:  No Known Allergies      History of Present Illness     HPI    10/24/2022, 11:47 PM  History obtained from the patient      History of Present Illness: Teresa Echols is a 37 y.o. female patient with a PMHx of HTN and CHF who presents to the Emergency Department for evaluation of SOB which onset gradually 5 days ago. Symptoms are intermittent and moderate in severity. No mitigating or exacerbating factors reported. Associated sxs include distended abdomen and cough. Patient denies any fever, chills, weakness, leg swelling, calf pain, n/v/d, dysuria, and all other sxs at this time. No prior Tx reported. No further complaints or concerns at this time.       Arrival mode: [facility]    PCP: Juan Contreras MD        Past Medical History:  Past Medical History:   Diagnosis Date    Bipolar disorder, unspecified     CHF (congestive heart failure)     Hypertension        Past Surgical History:  Past Surgical History:   Procedure Laterality Date    INSERTION OF INFERIOR VENA CAVAL FILTER N/A 9/28/2022    Procedure: INSERTION, FILTER, INFERIOR VENA CAVA;  Surgeon: Clarence Alicea MD;  Location: [facility] CATH LAB;  Service: General;  Laterality: N/A;         Family History:  Family History   Problem Relation Age of Onset    Hypertension Mother     Hypertension Father        Social History:  Social History     Tobacco Use    Smoking status: Every Day     Types: Cigarettes    Smokeless tobacco: Never   Substance and Sexual Activity    Alcohol use: Yes    Drug use: Yes     Types: Cocaine    Sexual activity: Not on file        Review of Systems     Review of Systems   Constitutional:  Negative for fever.    HENT:  Negative for sore throat.    Respiratory:  Positive for cough and shortness of breath.    Cardiovascular:  Negative for chest pain.   Gastrointestinal:  Positive for abdominal distention. Negative for abdominal pain, diarrhea, nausea and vomiting.   Genitourinary:  Negative for dysuria.   Musculoskeletal:  Negative for back pain.   Skin:  Negative for rash.   Neurological:  Negative for weakness.   Hematological:  Does not bruise/bleed easily.   All other systems reviewed and are negative.     Physical Exam     Initial Vitals [10/24/22 2247]   BP Pulse Resp Temp SpO2   100/67 (!) 115 18 99.3 °F (37.4 °C) 100 %      MAP       --          Physical Exam   Nursing Notes and Vital Signs Reviewed.  Constitutional: Patient is in no acute distress. Well-developed and well-nourished.  Head: Atraumatic. Normocephalic.  Eyes:  EOM intact.  No scleral icterus.  ENT: Mucous membranes are moist.  Nares clear   Neck:  Full ROM. No JVD.  Cardiovascular: Regular rate. Regular rhythm No murmurs, rubs, or gallops. Distal pulses are 2+ and symmetric  Pulmonary/Chest: No respiratory distress. Clear to auscultation bilaterally. No wheezing or rales.  Equal chest wall rise bilaterally  Abdominal: Soft and non-distended.  There is no tenderness.  No rebound, guarding, or rigidity. Good bowel sounds.  Genitourinary: No CVA tenderness.  No suprapubic tenderness  Musculoskeletal: Moves all extremities. No obvious deformities.  5 x 5 strength in all extremities   Skin: Warm and dry.  Neurological:  Alert, awake, and appropriate.  Normal speech.  No acute focal neurological deficits are appreciated.  Two through 12 intact bilaterally.  Psychiatric: Normal affect. Good eye contact. Appropriate in content.     ED Course   Critical Care    Date/Time: 10/25/2022 1:48 AM  Performed by: Alexander Kauffman Jr., MD  Authorized by: Alexander Kauffman Jr., MD   Direct patient critical care time: 35 minutes  Additional history critical care time: 10  minutes  Ordering / reviewing critical care time: 15 minutes  Documentation critical care time: 10 minutes  Total critical care time (exclusive of procedural time) : 70 minutes  Critical care time was exclusive of separately billable procedures and treating other patients and teaching time.  Critical care was necessary to treat or prevent imminent or life-threatening deterioration of the following conditions: CHF and elevated troponin.  Critical care was time spent personally by me on the following activities: blood draw for specimens, development of treatment plan with patient or surrogate, interpretation of cardiac output measurements, evaluation of patient's response to treatment, examination of patient, obtaining history from patient or surrogate, ordering and performing treatments and interventions, ordering and review of laboratory studies, ordering and review of radiographic studies, pulse oximetry, re-evaluation of patient's condition and review of old charts.      ED Vital Signs:  Vitals:    10/24/22 2247 10/25/22 0129 10/25/22 0328   BP: 100/67 106/79    Pulse: (!) 115     Resp: 18  18   Temp: 99.3 °F (37.4 °C)     TempSrc: Oral     SpO2: 100%         Abnormal Lab Results:  Labs Reviewed   CBC W/ AUTO DIFFERENTIAL - Abnormal; Notable for the following components:       Result Value    RBC 3.03 (*)     Hemoglobin 7.1 (*)     Hematocrit 24.9 (*)     MCH 23.4 (*)     MCHC 28.5 (*)     RDW 24.7 (*)     All other components within normal limits   COMPREHENSIVE METABOLIC PANEL - Abnormal; Notable for the following components:    Sodium 135 (*)     CO2 19 (*)     BUN 26 (*)     Albumin 2.8 (*)     Total Bilirubin 2.6 (*)     All other components within normal limits   TROPONIN I - Abnormal; Notable for the following components:    Troponin I 0.028 (*)     All other components within normal limits   B-TYPE NATRIURETIC PEPTIDE - Abnormal; Notable for the following components:    BNP 3,458 (*)     All other  components within normal limits   SARS-COV-2 RNA AMPLIFICATION, QUAL        All Lab Results:  Results for orders placed or performed during the hospital encounter of 10/24/22   CBC auto differential   Result Value Ref Range    WBC 5.80 3.90 - 12.70 K/uL    RBC 3.03 (L) 4.00 - 5.40 M/uL    Hemoglobin 7.1 (L) 12.0 - 16.0 g/dL    Hematocrit 24.9 (L) 37.0 - 48.5 %    MCV 82 82 - 98 fL    MCH 23.4 (L) 27.0 - 31.0 pg    MCHC 28.5 (L) 32.0 - 36.0 g/dL    RDW 24.7 (H) 11.5 - 14.5 %    Platelets 351 150 - 450 K/uL    MPV 9.6 9.2 - 12.9 fL    Immature Granulocytes CANCELED 0.0 - 0.5 %    Gran # (ANC) Test Not Performed K/uL    Immature Grans (Abs) Test Not Performed K/uL    Lymph # Test Not Performed K/uL    Mono # Test Not Performed K/uL    Eos # Test Not Performed K/uL    Baso # Test Not Performed K/uL    nRBC 0 0 /100 WBC    Gran % 63.0 38.0 - 73.0 %    Lymph % 33.0 18.0 - 48.0 %    Mono % 4.0 4.0 - 15.0 %    Eosinophil % 0.0 0.0 - 8.0 %    Basophil % 0.0 0.0 - 1.9 %    Platelet Estimate Appears normal     Aniso Moderate     Poik Moderate     Poly Occasional     Hypo Moderate     Target Cells Occasional     Tear Drop Cells Occasional     Schistocytes Present     Differential Method MANUAL    Comprehensive metabolic panel   Result Value Ref Range    Sodium 135 (L) 136 - 145 mmol/L    Potassium 4.1 3.5 - 5.1 mmol/L    Chloride 103 95 - 110 mmol/L    CO2 19 (L) 23 - 29 mmol/L    Glucose 72 70 - 110 mg/dL    BUN 26 (H) 6 - 20 mg/dL    Creatinine 0.9 0.5 - 1.4 mg/dL    Calcium 8.8 8.7 - 10.5 mg/dL    Total Protein 6.6 6.0 - 8.4 g/dL    Albumin 2.8 (L) 3.5 - 5.2 g/dL    Total Bilirubin 2.6 (H) 0.1 - 1.0 mg/dL    Alkaline Phosphatase 71 55 - 135 U/L    AST 27 10 - 40 U/L    ALT 16 10 - 44 U/L    Anion Gap 13 8 - 16 mmol/L    eGFR >60 >60 mL/min/1.73 m^2   Troponin I   Result Value Ref Range    Troponin I 0.028 (H) 0.000 - 0.026 ng/mL   Brain natriuretic peptide   Result Value Ref Range    BNP 3,458 (H) 0 - 99 pg/mL   COVID-19  Rapid Screening   Result Value Ref Range    SARS-CoV-2 RNA, Amplification, Qual Negative Negative           Imaging Results:  Imaging Results              X-Ray Chest AP Portable (Preliminary result)  Result time 10/25/22 00:54:46      ED Interpretation by Alexander Kauffman Jr., MD (10/25/22 00:54:46, [facility] - Emergency Dept., Emergency Medicine)    Cardiomegaly.  Mild left-sided effusion.  Clear lungs                                   The EKG was ordered, reviewed, and independently interpreted by the ED provider.  Interpretation time: 23:48  Rate: 114 BPM  Rhythm: sinus tachycardia  Interpretation: Nonspecific intraventricular block. Possible Anterolateral infarct, age undetermined. T wave abnormality, consider inferior ischemia. No STEMI.      The Emergency Provider reviewed the vital signs and test results, which are outlined above.     ED Discussion       1:47 AM: Discussed case with Elda Olivo NP (Hospital Medicine). Dr. Overton agrees with current care and management of pt and accepts admission.   Admitting Service: Hospital Medicine  Admitting Physician: Dr. Morales  Admit to: Obs tele    1:47 AM: Re-evaluated pt. I have discussed test results, shared treatment plan, and the need for admission with patient and family at bedside. Pt and family express understanding at this time and agree with all information. All questions answered. Pt and family have no further questions or concerns at this time. Pt is ready for admit.    Patient is nontoxic.  She presents complaining worsening shortness of breath in the setting of known heart disease and CHF.  Her BNP has elevated by 50% and that she now has a positive troponin.  She did complain of some chest pain associated with this and in light of these findings patient is being admitted to Hospital Medicine for further evaluation and treatment.  Patient is aware.     Medical Decision Making:   Clinical Tests:   Lab Tests: Ordered and Reviewed  Radiological Study:  Ordered and Reviewed  Medical Tests: Ordered and Reviewed         ED Medication(s):  Medications   HYDROcodone-acetaminophen 7.5-325 mg per tablet 1 tablet (1 tablet Oral Given 10/25/22 9328)   furosemide injection 80 mg (80 mg Intravenous Given 10/25/22 0117)       New Prescriptions    No medications on file               Scribe Attestation:   Scribe #1: I performed the above scribed service and the documentation accurately describes the services I performed. I attest to the accuracy of the note.     Attending:   Physician Attestation Statement for Scribe #1: I, Alexander Kauffman Jr., MD, personally performed the services described in this documentation, as scribed by Abdoulaye Sesay, in my presence, and it is both accurate and complete.           Clinical Impression       ICD-10-CM ICD-9-CM   1. Acute congestive heart failure, unspecified heart failure type  I50.9 428.0   2. Shortness of breath  R06.02 786.05   3. Troponin level elevated  R77.8 790.6   4. Chest pain, unspecified type  R07.9 786.50       Disposition:   Disposition: Placed in Observation  Condition: Fair       Alexander Kauffman Jr., MD  10/25/22 8305

## 2022-10-25 NOTE — ASSESSMENT & PLAN NOTE
Patient is identified as having Combined Systolic and Diastolic heart failure that is Acute on chronic. CHF is currently uncontrolled due to Pulmonary edema/pleural effusion on CXR. Latest ECHO performed and demonstrates- Results for orders placed during the hospital encounter of 09/22/22    Echo    Interpretation Summary  · The left ventricle is severely enlarged with concentric hypertrophy and severely decreased systolic function.  · The estimated ejection fraction is 15%.  · Severe left atrial enlargement.  · Severe right atrial enlargement.  · Grade III left ventricular diastolic dysfunction.  · The estimated PA systolic pressure is 106 mmHg.  · Normal right ventricular size with mildly reduced right ventricular systolic function.  · There is pulmonary hypertension.  · Elevated central venous pressure (15 mmHg).  · Moderate to severe tricuspid regurgitation.  · There is severe left ventricular global hypokinesis.  · Moderate pulmonic regurgitation.  · Moderate mitral regurgitation.  · There is a small right pleural effusion.  . Continue Beta Blocker, Furosemide and ARNI and monitor clinical status closely. Monitor on telemetry. Patient is on CHF pathway.  Monitor strict Is&Os and daily weights.  Place on fluid restriction of 1.5 L. Continue to stress to patient importance of self efficacy and  on diet for CHF. Last BNP reviewed- and noted below   Recent Labs   Lab 10/25/22  0022   BNP 3,458*   .

## 2022-10-25 NOTE — PROGRESS NOTES
[facility] Medicine  Progress Note    Patient Name: Teresa Echols  MRN: [id number]  Patient Class: OP- Observation   Admission Date: 10/24/2022  Length of Stay: 0 days  Attending Physician: Edson Morales MD  Primary Care Provider: Juan Contreras MD        Subjective:     Principal Problem:Acute on chronic systolic congestive heart failure        HPI:  37-year-old  female with Systolic CHF EF 15%, chronic cocaine user, right lower extremity DVT s/p thrombectomy, Bipolar d/o who presented to ED with SOB, leg swelling and bloating - onset one week ago. Pt denies chest pain. Denies recent cocaine use.     Ed work up showed sinus tachycardia- no ischemic changes. Hgb 7.1(baseline), BNP 3458, Troponin 0.028. CXR showed Cardiomegaly, Mild left-sided effusion.  Pt was given IV lasix 80mg. 1 liter UOP noted       The patient will be placed in observation for CHF exacerbation       Overview/Hospital Course:  Ms Echols is a 37 year old female who presented to [facility] for evaluation of shortness of breath. Associated symptoms include bilateral leg swelling and left side abdominal bloating. Denies associated symoptom of chest pain, fever, chills, nausea or vomiting. BNP elevated at 3458 on admission. Cardiology consulted for further evaluation.       Interval History:  Patient continue to experience CHF symptoms. Cardiology consulted.     Review of Systems   Constitutional:  Positive for activity change. Negative for appetite change, chills, diaphoresis, fatigue, fever and unexpected weight change.   HENT:  Negative for congestion, nosebleeds, sinus pressure and sore throat.    Eyes:  Negative for pain, discharge and visual disturbance.   Respiratory:  Positive for shortness of breath. Negative for cough, chest tightness, wheezing and stridor.    Cardiovascular:  Positive for leg swelling. Negative for chest pain and palpitations.   Gastrointestinal:  Positive for abdominal  distention (bloating). Negative for abdominal pain, blood in stool, constipation, diarrhea, nausea and vomiting.   Endocrine: Negative for cold intolerance and heat intolerance.   Genitourinary:  Negative for difficulty urinating, dysuria, flank pain, frequency and urgency.   Musculoskeletal:  Positive for arthralgias and back pain. Negative for joint swelling, myalgias, neck pain and neck stiffness.   Skin:  Negative for rash and wound.   Allergic/Immunologic: Negative for food allergies and immunocompromised state.   Neurological:  Positive for weakness. Negative for dizziness, seizures, syncope, facial asymmetry, speech difficulty, light-headedness, numbness and headaches.   Hematological:  Negative for adenopathy.   Psychiatric/Behavioral:  Negative for agitation, confusion and hallucinations.    Objective:     Vital Signs (Most Recent):  Temp: 98.2 °F (36.8 °C) (10/25/22 1003)  Pulse: 98 (10/25/22 1003)  Resp: 16 (10/25/22 1026)  BP: (!) 109/57 (10/25/22 1003)  SpO2: 100 % (10/25/22 1003)   Vital Signs (24h Range):  Temp:  [97.7 °F (36.5 °C)-99.3 °F (37.4 °C)] 98.2 °F (36.8 °C)  Pulse:  [] 98  Resp:  [16-20] 16  SpO2:  [99 %-100 %] 100 %  BP: (100-109)/(57-79) 109/57     Weight: 59.2 kg (130 lb 8.2 oz)  Body mass index is 22.4 kg/m².    Intake/Output Summary (Last 24 hours) at 10/25/2022 1333  Last data filed at 10/25/2022 1200  Gross per 24 hour   Intake --   Output 1 ml   Net -1 ml      Physical Exam  Vitals and nursing note reviewed.   Constitutional:       General: She is not in acute distress.     Appearance: She is well-developed. She is not diaphoretic.   HENT:      Head: Normocephalic and atraumatic.      Nose: Nose normal.   Eyes:      General: No scleral icterus.     Conjunctiva/sclera: Conjunctivae normal.   Neck:      Vascular: JVD present.      Trachea: No tracheal deviation.   Cardiovascular:      Rate and Rhythm: Normal rate and regular rhythm.      Heart sounds: Normal heart sounds. No  murmur heard.    No friction rub. No gallop.   Pulmonary:      Effort: Pulmonary effort is normal. No respiratory distress.      Breath sounds: No stridor. Examination of the right-lower field reveals rales. Examination of the left-lower field reveals rales. Rales present. No wheezing.      Comments: +conversational dyspnea    Chest:      Chest wall: No tenderness.   Abdominal:      General: Bowel sounds are normal. There is no distension.      Palpations: Abdomen is soft. There is no mass.      Tenderness: There is no abdominal tenderness. There is no guarding or rebound.   Musculoskeletal:         General: No tenderness or deformity. Normal range of motion.      Cervical back: Normal range of motion and neck supple.      Right lower leg: Edema (trace) present.      Left lower leg: Edema (trace) present.   Skin:     General: Skin is warm and dry.      Coloration: Skin is not pale.      Findings: No erythema or rash.   Neurological:      Mental Status: She is alert and oriented to person, place, and time.      Cranial Nerves: No cranial nerve deficit.      Motor: No abnormal muscle tone.      Coordination: Coordination normal.   Psychiatric:         Behavior: Behavior normal.         Thought Content: Thought content normal.       Significant Labs: All pertinent labs within the past 24 hours have been reviewed.  CBC:   Recent Labs   Lab 10/25/22  0022   WBC 5.80   HGB 7.1*   HCT 24.9*        CMP:   Recent Labs   Lab 10/25/22  0022   *   K 4.1      CO2 19*   GLU 72   BUN 26*   CREATININE 0.9   CALCIUM 8.8   PROT 6.6   ALBUMIN 2.8*   BILITOT 2.6*   ALKPHOS 71   AST 27   ALT 16   ANIONGAP 13     Magnesium:   Recent Labs   Lab 10/25/22  0548   MG 1.7     Troponin:   Recent Labs   Lab 10/25/22  0022 10/25/22  0548 10/25/22  1208   TROPONINI 0.028* 0.025 0.040*     TSH:   Recent Labs   Lab 10/25/22  0548   TSH 1.353     Urine Studies:   Recent Labs   Lab 10/25/22  0500   COLORU Yellow   APPEARANCEUA  Clear   PHUR 6.0   SPECGRAV 1.005   PROTEINUA Negative   GLUCUA Negative   KETONESU Negative   BILIRUBINUA Negative   OCCULTUA Negative   NITRITE Negative   UROBILINOGEN Negative   LEUKOCYTESUR Trace*   WBCUA 0   BACTERIA Rare   HYALINECASTS 1       Significant Imaging:       Assessment/Plan:      * Acute on chronic systolic congestive heart failure  Patient is identified as having Combined Systolic and Diastolic heart failure that is Acute on chronic. CHF is currently uncontrolled due to Pulmonary edema/pleural effusion on CXR. Latest ECHO performed and demonstrates- Results for orders placed during the hospital encounter of 09/22/22    Echo    Interpretation Summary  · The left ventricle is severely enlarged with concentric hypertrophy and severely decreased systolic function.  · The estimated ejection fraction is 15%.  · Severe left atrial enlargement.  · Severe right atrial enlargement.  · Grade III left ventricular diastolic dysfunction.  · The estimated PA systolic pressure is 106 mmHg.  · Normal right ventricular size with mildly reduced right ventricular systolic function.  · There is pulmonary hypertension.  · Elevated central venous pressure (15 mmHg).  · Moderate to severe tricuspid regurgitation.  · There is severe left ventricular global hypokinesis.  · Moderate pulmonic regurgitation.  · Moderate mitral regurgitation.  · There is a small right pleural effusion.  . Continue Beta Blocker, Furosemide and ARNI and monitor clinical status closely. Monitor on telemetry. Patient is on CHF pathway.  Monitor strict Is&Os and daily weights.  Place on fluid restriction of 1.5 L. Continue to stress to patient importance of self efficacy and  on diet for CHF. Last BNP reviewed- and noted below   Recent Labs   Lab 10/25/22  0022   BNP 3,458*   .    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  Cont Eliquis      Normocytic anemia  Appears at baseline   Iron studies   May benefit from PRBC after diuresis       10/25  HGB 7.1  Iron studies in process  Monitor, transfuse PRBC as needed       Cocaine abuse  History of cocaine abuse   Encourage cessation         VTE Risk Mitigation (From admission, onward)         Ordered     apixaban tablet 2.5 mg  2 times daily         10/25/22 0418     IP VTE HIGH RISK PATIENT  Once         10/25/22 0418     Place sequential compression device  Until discontinued         10/25/22 0418                Discharge Planning   PEPITO:      Code Status: Full Code   Is the patient medically ready for discharge?:     Reason for patient still in hospital (select all that apply): Patient trending condition and Treatment                     Paul Gray NP  Department of Hospital Medicine   [facility] ([facility])

## 2022-10-26 LAB
ALBUMIN SERPL BCP-MCNC: 2.4 G/DL (ref 3.5–5.2)
ALP SERPL-CCNC: 64 U/L (ref 55–135)
ALT SERPL W/O P-5'-P-CCNC: 13 U/L (ref 10–44)
ANION GAP SERPL CALC-SCNC: 10 MMOL/L (ref 8–16)
AST SERPL-CCNC: 21 U/L (ref 10–40)
BASOPHILS # BLD AUTO: 0.04 K/UL (ref 0–0.2)
BASOPHILS NFR BLD: 1 % (ref 0–1.9)
BILIRUB SERPL-MCNC: 3 MG/DL (ref 0.1–1)
BUN SERPL-MCNC: 28 MG/DL (ref 6–20)
CALCIUM SERPL-MCNC: 8.3 MG/DL (ref 8.7–10.5)
CHLORIDE SERPL-SCNC: 102 MMOL/L (ref 95–110)
CO2 SERPL-SCNC: 23 MMOL/L (ref 23–29)
CREAT SERPL-MCNC: 0.9 MG/DL (ref 0.5–1.4)
DIFFERENTIAL METHOD: ABNORMAL
EOSINOPHIL # BLD AUTO: 0.3 K/UL (ref 0–0.5)
EOSINOPHIL NFR BLD: 6.5 % (ref 0–8)
ERYTHROCYTE [DISTWIDTH] IN BLOOD BY AUTOMATED COUNT: 24.3 % (ref 11.5–14.5)
EST. GFR  (NO RACE VARIABLE): >60 ML/MIN/1.73 M^2
GLUCOSE SERPL-MCNC: 89 MG/DL (ref 70–110)
HCT VFR BLD AUTO: 24.6 % (ref 37–48.5)
HGB BLD-MCNC: 7.1 G/DL (ref 12–16)
IMM GRANULOCYTES # BLD AUTO: 0.01 K/UL (ref 0–0.04)
IMM GRANULOCYTES NFR BLD AUTO: 0.2 % (ref 0–0.5)
LYMPHOCYTES # BLD AUTO: 1.4 K/UL (ref 1–4.8)
LYMPHOCYTES NFR BLD: 33.9 % (ref 18–48)
MAGNESIUM SERPL-MCNC: 1.7 MG/DL (ref 1.6–2.6)
MCH RBC QN AUTO: 23.4 PG (ref 27–31)
MCHC RBC AUTO-ENTMCNC: 28.9 G/DL (ref 32–36)
MCV RBC AUTO: 81 FL (ref 82–98)
MONOCYTES # BLD AUTO: 0.4 K/UL (ref 0.3–1)
MONOCYTES NFR BLD: 9.6 % (ref 4–15)
NEUTROPHILS # BLD AUTO: 2 K/UL (ref 1.8–7.7)
NEUTROPHILS NFR BLD: 48.8 % (ref 38–73)
NRBC BLD-RTO: 0 /100 WBC
PLATELET # BLD AUTO: 301 K/UL (ref 150–450)
PMV BLD AUTO: 9.6 FL (ref 9.2–12.9)
POTASSIUM SERPL-SCNC: 3.7 MMOL/L (ref 3.5–5.1)
PROT SERPL-MCNC: 5.3 G/DL (ref 6–8.4)
RBC # BLD AUTO: 3.04 M/UL (ref 4–5.4)
SODIUM SERPL-SCNC: 135 MMOL/L (ref 136–145)
WBC # BLD AUTO: 4.16 K/UL (ref 3.9–12.7)

## 2022-10-26 PROCEDURE — 63600175 PHARM REV CODE 636 W HCPCS: Performed by: INTERNAL MEDICINE

## 2022-10-26 PROCEDURE — 96376 TX/PRO/DX INJ SAME DRUG ADON: CPT

## 2022-10-26 PROCEDURE — 99214 PR OFFICE/OUTPT VISIT, EST, LEVL IV, 30-39 MIN: ICD-10-PCS | Mod: ,,, | Performed by: INTERNAL MEDICINE

## 2022-10-26 PROCEDURE — 99214 OFFICE O/P EST MOD 30 MIN: CPT | Mod: ,,, | Performed by: INTERNAL MEDICINE

## 2022-10-26 PROCEDURE — 85025 COMPLETE CBC W/AUTO DIFF WBC: CPT | Performed by: NURSE PRACTITIONER

## 2022-10-26 PROCEDURE — 94761 N-INVAS EAR/PLS OXIMETRY MLT: CPT

## 2022-10-26 PROCEDURE — 25000003 PHARM REV CODE 250: Performed by: NURSE PRACTITIONER

## 2022-10-26 PROCEDURE — 80053 COMPREHEN METABOLIC PANEL: CPT | Performed by: NURSE PRACTITIONER

## 2022-10-26 PROCEDURE — G0378 HOSPITAL OBSERVATION PER HR: HCPCS

## 2022-10-26 PROCEDURE — 94760 N-INVAS EAR/PLS OXIMETRY 1: CPT

## 2022-10-26 PROCEDURE — 36415 COLL VENOUS BLD VENIPUNCTURE: CPT | Performed by: NURSE PRACTITIONER

## 2022-10-26 PROCEDURE — 83735 ASSAY OF MAGNESIUM: CPT | Performed by: NURSE PRACTITIONER

## 2022-10-26 PROCEDURE — 96375 TX/PRO/DX INJ NEW DRUG ADDON: CPT

## 2022-10-26 RX ORDER — LACTULOSE 10 G/15ML
30 SOLUTION ORAL ONCE
Status: COMPLETED | OUTPATIENT
Start: 2022-10-26 | End: 2022-10-26

## 2022-10-26 RX ORDER — FUROSEMIDE 10 MG/ML
20 INJECTION INTRAMUSCULAR; INTRAVENOUS ONCE
Status: COMPLETED | OUTPATIENT
Start: 2022-10-26 | End: 2022-10-26

## 2022-10-26 RX ADMIN — METOPROLOL SUCCINATE 12.5 MG: 25 TABLET, EXTENDED RELEASE ORAL at 08:10

## 2022-10-26 RX ADMIN — IRON SUCROSE 200 MG: 20 INJECTION, SOLUTION INTRAVENOUS at 09:10

## 2022-10-26 RX ADMIN — SENNOSIDES AND DOCUSATE SODIUM 1 TABLET: 50; 8.6 TABLET ORAL at 08:10

## 2022-10-26 RX ADMIN — FUROSEMIDE 20 MG: 10 INJECTION, SOLUTION INTRAMUSCULAR; INTRAVENOUS at 05:10

## 2022-10-26 RX ADMIN — ATORVASTATIN CALCIUM 40 MG: 40 TABLET, FILM COATED ORAL at 08:10

## 2022-10-26 RX ADMIN — ASPIRIN 81 MG CHEWABLE TABLET 81 MG: 81 TABLET CHEWABLE at 08:10

## 2022-10-26 RX ADMIN — APIXABAN 2.5 MG: 2.5 TABLET, FILM COATED ORAL at 08:10

## 2022-10-26 RX ADMIN — QUETIAPINE FUMARATE 100 MG: 100 TABLET ORAL at 08:10

## 2022-10-26 RX ADMIN — FOLIC ACID 1 MG: 1 TABLET ORAL at 08:10

## 2022-10-26 RX ADMIN — HYDROXYZINE PAMOATE 25 MG: 25 CAPSULE ORAL at 08:10

## 2022-10-26 RX ADMIN — LACTULOSE 30 G: 20 SOLUTION ORAL at 04:10

## 2022-10-26 RX ADMIN — POLYETHYLENE GLYCOL 3350 17 G: 17 POWDER, FOR SOLUTION ORAL at 08:10

## 2022-10-26 NOTE — PROGRESS NOTES
[facility] ([facility])  Cardiology  Progress Note    Patient Name: Teresa Echols  MRN: [id number]  Admission Date: 10/24/2022  Hospital Length of Stay: 0 days  Code Status: Full Code   Attending Physician: Edson Morales MD   Primary Care Physician: Juan Contreras MD  Expected Discharge Date:   Principal Problem:Acute on chronic systolic congestive heart failure    Subjective:     Hospital Course:   10/26/22-Patient seen and examined today, resting in bed. Feeling better. Less SOB. Still weak and CROW   Hypotensive this am , did take entresto at night yesterday   Lasix held this morning   Still has jatinder LE edema       Interval History:  Patient continue to experience CHF symptoms. Cardiology consulted.     Review of Systems   Constitutional:  Positive for activity change. Negative for appetite change, chills, diaphoresis, fatigue, fever and unexpected weight change.   HENT:  Negative for congestion, nosebleeds, sinus pressure and sore throat.    Eyes:  Negative for pain, discharge and visual disturbance.   Respiratory:  Positive for shortness of breath. Negative for cough, chest tightness, wheezing and stridor.    Cardiovascular:  Positive for leg swelling. Negative for chest pain and palpitations.   Gastrointestinal:  Positive for abdominal distention (bloating). Negative for abdominal pain, blood in stool, constipation, diarrhea, nausea and vomiting.   Endocrine: Negative for cold intolerance and heat intolerance.   Genitourinary:  Negative for difficulty urinating, dysuria, flank pain, frequency and urgency.   Musculoskeletal:  Positive for arthralgias and back pain. Negative for joint swelling, myalgias, neck pain and neck stiffness.   Skin:  Negative for rash and wound.   Allergic/Immunologic: Negative for food allergies and immunocompromised state.   Neurological:  Positive for weakness. Negative for dizziness, seizures, syncope, facial asymmetry, speech difficulty, light-headedness, numbness and  headaches.   Hematological:  Negative for adenopathy.   Psychiatric/Behavioral:  Negative for agitation, confusion and hallucinations.    Objective:     Vital Signs (Most Recent):  Temp: 97.6 °F (36.4 °C) (10/26/22 1538)  Pulse: 105 (10/26/22 1538)  Resp: 20 (10/26/22 1538)  BP: (!) 93/58 (10/26/22 1538)  SpO2: (!) 93 % (10/26/22 1538)   Vital Signs (24h Range):  Temp:  [97.1 °F (36.2 °C)-98.4 °F (36.9 °C)] 97.6 °F (36.4 °C)  Pulse:  [] 105  Resp:  [18-20] 20  SpO2:  [93 %-99 %] 93 %  BP: (80-97)/(50-72) 93/58     Weight: 59.2 kg (130 lb 8.2 oz)  Body mass index is 22.4 kg/m².    Intake/Output Summary (Last 24 hours) at 10/26/2022 1601  Last data filed at 10/26/2022 1410  Gross per 24 hour   Intake --   Output 400 ml   Net -400 ml        Physical Exam  Vitals and nursing note reviewed.   Constitutional:       General: She is not in acute distress.     Appearance: She is well-developed. She is not diaphoretic.   HENT:      Head: Normocephalic and atraumatic.      Nose: Nose normal.   Eyes:      General: No scleral icterus.     Conjunctiva/sclera: Conjunctivae normal.   Neck:      Vascular: JVD present.      Trachea: No tracheal deviation.   Cardiovascular:      Rate and Rhythm: Normal rate and regular rhythm.      Heart sounds: Normal heart sounds. No murmur heard.    No friction rub. No gallop.   Pulmonary:      Effort: Pulmonary effort is normal. No respiratory distress.      Breath sounds: No stridor. Examination of the right-lower field reveals rales. Examination of the left-lower field reveals rales. Rales present. No wheezing.      Comments: +conversational dyspnea    Chest:      Chest wall: No tenderness.   Abdominal:      General: Bowel sounds are normal. There is no distension.      Palpations: Abdomen is soft. There is no mass.      Tenderness: There is no abdominal tenderness. There is no guarding or rebound.   Musculoskeletal:         General: No tenderness or deformity. Normal range of motion.       Cervical back: Normal range of motion and neck supple.      Right lower leg: Edema (trace) present.      Left lower leg: Edema (trace) present.   Skin:     General: Skin is warm and dry.      Coloration: Skin is not pale.      Findings: No erythema or rash.   Neurological:      Mental Status: She is alert and oriented to person, place, and time.      Cranial Nerves: No cranial nerve deficit.      Motor: No abnormal muscle tone.      Coordination: Coordination normal.   Psychiatric:         Behavior: Behavior normal.         Thought Content: Thought content normal.       Significant Labs: All pertinent labs within the past 24 hours have been reviewed.  CBC:   Recent Labs   Lab 10/25/22  0022 10/26/22  0500   WBC 5.80 4.16   HGB 7.1* 7.1*   HCT 24.9* 24.6*    301       CMP:   Recent Labs   Lab 10/25/22  0022 10/26/22  0500   * 135*   K 4.1 3.7    102   CO2 19* 23   GLU 72 89   BUN 26* 28*   CREATININE 0.9 0.9   CALCIUM 8.8 8.3*   PROT 6.6 5.3*   ALBUMIN 2.8* 2.4*   BILITOT 2.6* 3.0*   ALKPHOS 71 64   AST 27 21   ALT 16 13   ANIONGAP 13 10       Magnesium:   Recent Labs   Lab 10/25/22  0548 10/26/22  0500   MG 1.7 1.7       Troponin:   Recent Labs   Lab 10/25/22  0548 10/25/22  1208 10/25/22  1813   TROPONINI 0.025 0.040* 0.032*       TSH:   Recent Labs   Lab 10/25/22  0548   TSH 1.353       Urine Studies:   Recent Labs   Lab 10/25/22  0500   COLORU Yellow   APPEARANCEUA Clear   PHUR 6.0   SPECGRAV 1.005   PROTEINUA Negative   GLUCUA Negative   KETONESU Negative   BILIRUBINUA Negative   OCCULTUA Negative   NITRITE Negative   UROBILINOGEN Negative   LEUKOCYTESUR Trace*   WBCUA 0   BACTERIA Rare   HYALINECASTS 1         Significant Imaging:   Results for orders placed during the hospital encounter of 09/22/22    Echo    Interpretation Summary  · The left ventricle is severely enlarged with concentric hypertrophy and severely decreased systolic function.  · The estimated ejection fraction is 15%.  ·  Severe left atrial enlargement.  · Severe right atrial enlargement.  · Grade III left ventricular diastolic dysfunction.  · The estimated PA systolic pressure is 106 mmHg.  · Normal right ventricular size with mildly reduced right ventricular systolic function.  · There is pulmonary hypertension.  · Elevated central venous pressure (15 mmHg).  · Moderate to severe tricuspid regurgitation.  · There is severe left ventricular global hypokinesis.  · Moderate pulmonic regurgitation.  · Moderate mitral regurgitation.  · There is a small right pleural effusion.          Assessment and Plan:         * Acute on chronic systolic congestive heart failure  Patient is identified as having Combined Systolic and Diastolic heart failure that is Acute on chronic. CHF is currently uncontrolled due to Continued edema of extremities. Latest ECHO performed and demonstrates- Results for orders placed during the hospital encounter of 09/22/22    Echo    Interpretation Summary  · The left ventricle is severely enlarged with concentric hypertrophy and severely decreased systolic function.  · The estimated ejection fraction is 15%.  · Severe left atrial enlargement.  · Severe right atrial enlargement.  · Grade III left ventricular diastolic dysfunction.  · The estimated PA systolic pressure is 106 mmHg.  · Normal right ventricular size with mildly reduced right ventricular systolic function.  · There is pulmonary hypertension.  · Elevated central venous pressure (15 mmHg).  · Moderate to severe tricuspid regurgitation.  · There is severe left ventricular global hypokinesis.  · Moderate pulmonic regurgitation.  · Moderate mitral regurgitation.  · There is a small right pleural effusion.  . Continue Beta Blocker and monitor clinical status closely. Monitor on telemetry. Patient is off CHF pathway.  Monitor strict Is&Os and daily weights.  Place on fluid restriction of 1.5 L. Continue to stress to patient importance of self efficacy and   on diet for CHF. Last BNP reviewed- and noted below   Recent Labs   Lab 10/25/22  0022   BNP 3,458*   .  Add low dose entresto qhs  Continue with iv lasix     10.26.2022  Lower lasix to 20 mg iv , daily   cw entresto qhs , toprol 25 mg daily   Anticipate dc in am     Elevated troponin  Secondary to ADHF    Cocaine abuse  Denies any recent use   Check UDS        VTE Risk Mitigation (From admission, onward)         Ordered     apixaban tablet 2.5 mg  2 times daily         10/25/22 0418     IP VTE HIGH RISK PATIENT  Once         10/25/22 0418     Place sequential compression device  Until discontinued         10/25/22 0418                Arnaldo Lundberg MD  Cardiology  [facility] ([facility])

## 2022-10-26 NOTE — SUBJECTIVE & OBJECTIVE
Interval History:  Patient continue to experience CHF symptoms. Cardiology consulted.     Review of Systems   Constitutional:  Positive for activity change. Negative for appetite change, chills, diaphoresis, fatigue, fever and unexpected weight change.   HENT:  Negative for congestion, nosebleeds, sinus pressure and sore throat.    Eyes:  Negative for pain, discharge and visual disturbance.   Respiratory:  Positive for shortness of breath. Negative for cough, chest tightness, wheezing and stridor.    Cardiovascular:  Positive for leg swelling. Negative for chest pain and palpitations.   Gastrointestinal:  Positive for abdominal distention (bloating). Negative for abdominal pain, blood in stool, constipation, diarrhea, nausea and vomiting.   Endocrine: Negative for cold intolerance and heat intolerance.   Genitourinary:  Negative for difficulty urinating, dysuria, flank pain, frequency and urgency.   Musculoskeletal:  Positive for arthralgias and back pain. Negative for joint swelling, myalgias, neck pain and neck stiffness.   Skin:  Negative for rash and wound.   Allergic/Immunologic: Negative for food allergies and immunocompromised state.   Neurological:  Positive for weakness. Negative for dizziness, seizures, syncope, facial asymmetry, speech difficulty, light-headedness, numbness and headaches.   Hematological:  Negative for adenopathy.   Psychiatric/Behavioral:  Negative for agitation, confusion and hallucinations.    Objective:     Vital Signs (Most Recent):  Temp: 97.6 °F (36.4 °C) (10/26/22 1538)  Pulse: 105 (10/26/22 1538)  Resp: 20 (10/26/22 1538)  BP: (!) 93/58 (10/26/22 1538)  SpO2: (!) 93 % (10/26/22 1538)   Vital Signs (24h Range):  Temp:  [97.1 °F (36.2 °C)-98.4 °F (36.9 °C)] 97.6 °F (36.4 °C)  Pulse:  [] 105  Resp:  [18-20] 20  SpO2:  [93 %-99 %] 93 %  BP: (80-97)/(50-72) 93/58     Weight: 59.2 kg (130 lb 8.2 oz)  Body mass index is 22.4 kg/m².    Intake/Output Summary (Last 24 hours) at  10/26/2022 1601  Last data filed at 10/26/2022 1410  Gross per 24 hour   Intake --   Output 400 ml   Net -400 ml        Physical Exam  Vitals and nursing note reviewed.   Constitutional:       General: She is not in acute distress.     Appearance: She is well-developed. She is not diaphoretic.   HENT:      Head: Normocephalic and atraumatic.      Nose: Nose normal.   Eyes:      General: No scleral icterus.     Conjunctiva/sclera: Conjunctivae normal.   Neck:      Vascular: JVD present.      Trachea: No tracheal deviation.   Cardiovascular:      Rate and Rhythm: Normal rate and regular rhythm.      Heart sounds: Normal heart sounds. No murmur heard.    No friction rub. No gallop.   Pulmonary:      Effort: Pulmonary effort is normal. No respiratory distress.      Breath sounds: No stridor. Examination of the right-lower field reveals rales. Examination of the left-lower field reveals rales. Rales present. No wheezing.      Comments: +conversational dyspnea    Chest:      Chest wall: No tenderness.   Abdominal:      General: Bowel sounds are normal. There is no distension.      Palpations: Abdomen is soft. There is no mass.      Tenderness: There is no abdominal tenderness. There is no guarding or rebound.   Musculoskeletal:         General: No tenderness or deformity. Normal range of motion.      Cervical back: Normal range of motion and neck supple.      Right lower leg: Edema (trace) present.      Left lower leg: Edema (trace) present.   Skin:     General: Skin is warm and dry.      Coloration: Skin is not pale.      Findings: No erythema or rash.   Neurological:      Mental Status: She is alert and oriented to person, place, and time.      Cranial Nerves: No cranial nerve deficit.      Motor: No abnormal muscle tone.      Coordination: Coordination normal.   Psychiatric:         Behavior: Behavior normal.         Thought Content: Thought content normal.       Significant Labs: All pertinent labs within the past 24  hours have been reviewed.  CBC:   Recent Labs   Lab 10/25/22  0022 10/26/22  0500   WBC 5.80 4.16   HGB 7.1* 7.1*   HCT 24.9* 24.6*    301       CMP:   Recent Labs   Lab 10/25/22  0022 10/26/22  0500   * 135*   K 4.1 3.7    102   CO2 19* 23   GLU 72 89   BUN 26* 28*   CREATININE 0.9 0.9   CALCIUM 8.8 8.3*   PROT 6.6 5.3*   ALBUMIN 2.8* 2.4*   BILITOT 2.6* 3.0*   ALKPHOS 71 64   AST 27 21   ALT 16 13   ANIONGAP 13 10       Magnesium:   Recent Labs   Lab 10/25/22  0548 10/26/22  0500   MG 1.7 1.7       Troponin:   Recent Labs   Lab 10/25/22  0548 10/25/22  1208 10/25/22  1813   TROPONINI 0.025 0.040* 0.032*       TSH:   Recent Labs   Lab 10/25/22  0548   TSH 1.353       Urine Studies:   Recent Labs   Lab 10/25/22  0500   COLORU Yellow   APPEARANCEUA Clear   PHUR 6.0   SPECGRAV 1.005   PROTEINUA Negative   GLUCUA Negative   KETONESU Negative   BILIRUBINUA Negative   OCCULTUA Negative   NITRITE Negative   UROBILINOGEN Negative   LEUKOCYTESUR Trace*   WBCUA 0   BACTERIA Rare   HYALINECASTS 1         Significant Imaging:   Results for orders placed during the hospital encounter of 09/22/22    Echo    Interpretation Summary  · The left ventricle is severely enlarged with concentric hypertrophy and severely decreased systolic function.  · The estimated ejection fraction is 15%.  · Severe left atrial enlargement.  · Severe right atrial enlargement.  · Grade III left ventricular diastolic dysfunction.  · The estimated PA systolic pressure is 106 mmHg.  · Normal right ventricular size with mildly reduced right ventricular systolic function.  · There is pulmonary hypertension.  · Elevated central venous pressure (15 mmHg).  · Moderate to severe tricuspid regurgitation.  · There is severe left ventricular global hypokinesis.  · Moderate pulmonic regurgitation.  · Moderate mitral regurgitation.  · There is a small right pleural effusion.

## 2022-10-26 NOTE — PROGRESS NOTES
10/26/22 0915   Vital Signs   BP (!) 82/58   BP Location Right arm   BP Method Manual       MD and Primary nurse notified regarding BP. Pt un symptomatic at this time.

## 2022-10-26 NOTE — PROGRESS NOTES
[facility] Medicine  Progress Note    Patient Name: Teresa Echols  MRN: [id number]  Patient Class: OP- Observation   Admission Date: 10/24/2022  Length of Stay: 0 days  Attending Physician: Edson Morales MD  Primary Care Provider: Juan Contreras MD        Subjective:     Principal Problem:Acute on chronic systolic congestive heart failure        HPI:  37-year-old  female with Systolic CHF EF 15%, chronic cocaine user, right lower extremity DVT s/p thrombectomy, Bipolar d/o who presented to ED with SOB, leg swelling and bloating - onset one week ago. Pt denies chest pain. Denies recent cocaine use.     Ed work up showed sinus tachycardia- no ischemic changes. Hgb 7.1(baseline), BNP 3458, Troponin 0.028. CXR showed Cardiomegaly, Mild left-sided effusion.  Pt was given IV lasix 80mg. 1 liter UOP noted       The patient will be placed in observation for CHF exacerbation       Overview/Hospital Course:  Ms Echols is a 37 year old female who presented to [facility] for evaluation of shortness of breath. Associated symptoms include bilateral leg swelling and left side abdominal bloating. Denies associated symoptom of chest pain, fever, chills, nausea or vomiting. BNP elevated at 3458 on admission. Cardiology consulted for further evaluation. As of 10/26/2022, patient repots shortness of breath and orthopnea. She also noted to have some hypotension this morning. Cardiology following. Will monitor.       Interval History: Pt continues to report shortness of breath, CROW,    Review of Systems   Constitutional:  Positive for activity change. Negative for appetite change, chills, diaphoresis, fatigue, fever and unexpected weight change.   HENT:  Negative for congestion, nosebleeds, sinus pressure and sore throat.    Eyes:  Negative for pain, discharge and visual disturbance.   Respiratory:  Positive for shortness of breath. Negative for cough, chest tightness, wheezing and stridor.          Orthopna    Cardiovascular:  Positive for leg swelling. Negative for chest pain and palpitations.   Gastrointestinal:  Positive for abdominal distention (bloating) and constipation. Negative for abdominal pain, blood in stool, diarrhea, nausea and vomiting.   Endocrine: Negative for cold intolerance and heat intolerance.   Genitourinary:  Negative for difficulty urinating, dysuria, flank pain, frequency and urgency.   Musculoskeletal:  Positive for arthralgias and back pain. Negative for joint swelling, myalgias, neck pain and neck stiffness.   Skin:  Negative for rash and wound.   Allergic/Immunologic: Negative for food allergies and immunocompromised state.   Neurological:  Positive for weakness. Negative for dizziness, seizures, syncope, facial asymmetry, speech difficulty, light-headedness, numbness and headaches.   Hematological:  Negative for adenopathy.   Psychiatric/Behavioral:  Negative for agitation, confusion and hallucinations.    Objective:     Vital Signs (Most Recent):  Temp: 97.9 °F (36.6 °C) (10/26/22 0742)  Pulse: 90 (10/26/22 1300)  Resp: 20 (10/26/22 1150)  BP: 97/72 (10/26/22 1150)  SpO2: (!) 94 % (10/26/22 1150)   Vital Signs (24h Range):  Temp:  [97.1 °F (36.2 °C)-98.4 °F (36.9 °C)] 97.9 °F (36.6 °C)  Pulse:  [] 90  Resp:  [18-20] 20  SpO2:  [94 %-99 %] 94 %  BP: ()/(50-72) 97/72     Weight: 59.2 kg (130 lb 8.2 oz)  Body mass index is 22.4 kg/m².    Intake/Output Summary (Last 24 hours) at 10/26/2022 1514  Last data filed at 10/26/2022 1410  Gross per 24 hour   Intake --   Output 400 ml   Net -400 ml      Physical Exam  Vitals and nursing note reviewed.   Constitutional:       General: She is not in acute distress.     Appearance: She is well-developed. She is not diaphoretic.   HENT:      Head: Normocephalic and atraumatic.      Nose: Nose normal.   Eyes:      General: No scleral icterus.     Conjunctiva/sclera: Conjunctivae normal.   Neck:      Vascular: JVD present.       Trachea: No tracheal deviation.   Cardiovascular:      Rate and Rhythm: Normal rate and regular rhythm.      Heart sounds: Normal heart sounds. No murmur heard.    No friction rub. No gallop.   Pulmonary:      Effort: Pulmonary effort is normal. No respiratory distress.      Breath sounds: No stridor. Examination of the right-lower field reveals rales. Examination of the left-lower field reveals rales. Rales present. No wheezing.      Comments: +conversational dyspnea    Chest:      Chest wall: No tenderness.   Abdominal:      General: Bowel sounds are normal. There is distension.      Palpations: Abdomen is soft. There is no mass.      Tenderness: There is no abdominal tenderness. There is no guarding or rebound.   Musculoskeletal:         General: No tenderness or deformity. Normal range of motion.      Cervical back: Normal range of motion and neck supple.      Right lower leg: Edema (trace) present.      Left lower leg: Edema (trace) present.   Skin:     General: Skin is warm and dry.      Coloration: Skin is not pale.      Findings: No erythema or rash.   Neurological:      Mental Status: She is alert and oriented to person, place, and time.      Cranial Nerves: No cranial nerve deficit.      Motor: No abnormal muscle tone.      Coordination: Coordination normal.   Psychiatric:         Behavior: Behavior normal.         Thought Content: Thought content normal.       Significant Labs: All pertinent labs within the past 24 hours have been reviewed.  CBC:   Recent Labs   Lab 10/25/22  0022 10/26/22  0500   WBC 5.80 4.16   HGB 7.1* 7.1*   HCT 24.9* 24.6*    301     CMP:   Recent Labs   Lab 10/25/22  0022 10/26/22  0500   * 135*   K 4.1 3.7    102   CO2 19* 23   GLU 72 89   BUN 26* 28*   CREATININE 0.9 0.9   CALCIUM 8.8 8.3*   PROT 6.6 5.3*   ALBUMIN 2.8* 2.4*   BILITOT 2.6* 3.0*   ALKPHOS 71 64   AST 27 21   ALT 16 13   ANIONGAP 13 10       Significant Imaging:     Imaging Results               X-Ray Chest AP Portable (Final result)  Result time 10/25/22 07:11:58      Final result by Vince Carter MD (10/25/22 07:11:58)                   Impression:      1. Similar marked cardiomegaly.  2. Developing opacity in the right lung base may reflect pneumonia.      Electronically signed by: Vince Carter  Date:    10/25/2022  Time:    07:11               Narrative:    EXAMINATION:  XR CHEST AP PORTABLE    CLINICAL HISTORY:  CHF;    FINDINGS:  Comparison is made to September 27, 2022.    The mediastinal silhouette is enlarged, similar to previous.  There is new opacity in the right lung base, the remainder of the right lung is clear.  Trace left pleural effusion is suspected.  No pneumothorax.  No acute osseous finding.                        ED Interpretation by Alexander Kauffman Jr., MD (10/25/22 00:54:46, [facility] - Emergency Dept., Emergency Medicine)    Cardiomegaly.  Mild left-sided effusion.  Clear lungs                                       Assessment/Plan:      * Acute on chronic systolic congestive heart failure  Patient is identified as having Combined Systolic and Diastolic heart failure that is Acute on chronic. CHF is currently uncontrolled due to Pulmonary edema/pleural effusion on CXR. Latest ECHO performed and demonstrates- Results for orders placed during the hospital encounter of 09/22/22    Echo    Interpretation Summary  · The left ventricle is severely enlarged with concentric hypertrophy and severely decreased systolic function.  · The estimated ejection fraction is 15%.  · Severe left atrial enlargement.  · Severe right atrial enlargement.  · Grade III left ventricular diastolic dysfunction.  · The estimated PA systolic pressure is 106 mmHg.  · Normal right ventricular size with mildly reduced right ventricular systolic function.  · There is pulmonary hypertension.  · Elevated central venous pressure (15 mmHg).  · Moderate to severe tricuspid regurgitation.  · There is severe left  ventricular global hypokinesis.  · Moderate pulmonic regurgitation.  · Moderate mitral regurgitation.  · There is a small right pleural effusion.  . Continue Beta Blocker, Furosemide and ARNI and monitor clinical status closely. Monitor on telemetry. Patient is on CHF pathway.  Monitor strict Is&Os and daily weights.  Place on fluid restriction of 1.5 L. Continue to stress to patient importance of self efficacy and  on diet for CHF. Last BNP reviewed- and noted below   Recent Labs   Lab 10/25/22  0022   BNP 3,458*   .    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  Cont Eliquis      Normocytic anemia  Appears at baseline   Iron studies   May benefit from PRBC after diuresis       10/25  HGB 7.1  Iron studies in process  Monitor, transfuse PRBC as needed       10/26  HGB 7.1  Iron level low,   Venofer 200 mg daily X 3 doses           Cocaine abuse  History of cocaine abuse   Encourage cessation         VTE Risk Mitigation (From admission, onward)         Ordered     apixaban tablet 2.5 mg  2 times daily         10/25/22 0418     IP VTE HIGH RISK PATIENT  Once         10/25/22 0418     Place sequential compression device  Until discontinued         10/25/22 0418                Discharge Planning   PEPITO:      Code Status: Full Code   Is the patient medically ready for discharge?:     Reason for patient still in hospital (select all that apply): Patient trending condition and Treatment  Discharge Plan A: Home with family                  Paul Gray NP  Department of Hospital Medicine   [facility] Tele[facility] ([facility]

## 2022-10-26 NOTE — HOSPITAL COURSE
10/26/22-Patient seen and examined today, resting in bed. Feeling better. Less SOB. Still weak and CROW   Hypotensive this am , did take entresto at night yesterday   Lasix held this morning   Still has jatinder LE edema     10/27/22-Patient seen and examined today, resting in bed. SOB and LE edema improved. Feels nearly back to her baseline. Diuresed well overnight. BP remains tenuous, Entresto d/c'd, can consider 1/2 tab nightly as OP. Labs reviewed, K low being repleted. H/H at baseline.

## 2022-10-26 NOTE — SUBJECTIVE & OBJECTIVE
Interval History: Pt continues to report shortness of breath, CROW,    Review of Systems   Constitutional:  Positive for activity change. Negative for appetite change, chills, diaphoresis, fatigue, fever and unexpected weight change.   HENT:  Negative for congestion, nosebleeds, sinus pressure and sore throat.    Eyes:  Negative for pain, discharge and visual disturbance.   Respiratory:  Positive for shortness of breath. Negative for cough, chest tightness, wheezing and stridor.         Orthopna    Cardiovascular:  Positive for leg swelling. Negative for chest pain and palpitations.   Gastrointestinal:  Positive for abdominal distention (bloating) and constipation. Negative for abdominal pain, blood in stool, diarrhea, nausea and vomiting.   Endocrine: Negative for cold intolerance and heat intolerance.   Genitourinary:  Negative for difficulty urinating, dysuria, flank pain, frequency and urgency.   Musculoskeletal:  Positive for arthralgias and back pain. Negative for joint swelling, myalgias, neck pain and neck stiffness.   Skin:  Negative for rash and wound.   Allergic/Immunologic: Negative for food allergies and immunocompromised state.   Neurological:  Positive for weakness. Negative for dizziness, seizures, syncope, facial asymmetry, speech difficulty, light-headedness, numbness and headaches.   Hematological:  Negative for adenopathy.   Psychiatric/Behavioral:  Negative for agitation, confusion and hallucinations.    Objective:     Vital Signs (Most Recent):  Temp: 97.9 °F (36.6 °C) (10/26/22 0742)  Pulse: 90 (10/26/22 1300)  Resp: 20 (10/26/22 1150)  BP: 97/72 (10/26/22 1150)  SpO2: (!) 94 % (10/26/22 1150)   Vital Signs (24h Range):  Temp:  [97.1 °F (36.2 °C)-98.4 °F (36.9 °C)] 97.9 °F (36.6 °C)  Pulse:  [] 90  Resp:  [18-20] 20  SpO2:  [94 %-99 %] 94 %  BP: ()/(50-72) 97/72     Weight: 59.2 kg (130 lb 8.2 oz)  Body mass index is 22.4 kg/m².    Intake/Output Summary (Last 24 hours) at 10/26/2022  1514  Last data filed at 10/26/2022 1410  Gross per 24 hour   Intake --   Output 400 ml   Net -400 ml      Physical Exam  Vitals and nursing note reviewed.   Constitutional:       General: She is not in acute distress.     Appearance: She is well-developed. She is not diaphoretic.   HENT:      Head: Normocephalic and atraumatic.      Nose: Nose normal.   Eyes:      General: No scleral icterus.     Conjunctiva/sclera: Conjunctivae normal.   Neck:      Vascular: JVD present.      Trachea: No tracheal deviation.   Cardiovascular:      Rate and Rhythm: Normal rate and regular rhythm.      Heart sounds: Normal heart sounds. No murmur heard.    No friction rub. No gallop.   Pulmonary:      Effort: Pulmonary effort is normal. No respiratory distress.      Breath sounds: No stridor. Examination of the right-lower field reveals rales. Examination of the left-lower field reveals rales. Rales present. No wheezing.      Comments: +conversational dyspnea    Chest:      Chest wall: No tenderness.   Abdominal:      General: Bowel sounds are normal. There is distension.      Palpations: Abdomen is soft. There is no mass.      Tenderness: There is no abdominal tenderness. There is no guarding or rebound.   Musculoskeletal:         General: No tenderness or deformity. Normal range of motion.      Cervical back: Normal range of motion and neck supple.      Right lower leg: Edema (trace) present.      Left lower leg: Edema (trace) present.   Skin:     General: Skin is warm and dry.      Coloration: Skin is not pale.      Findings: No erythema or rash.   Neurological:      Mental Status: She is alert and oriented to person, place, and time.      Cranial Nerves: No cranial nerve deficit.      Motor: No abnormal muscle tone.      Coordination: Coordination normal.   Psychiatric:         Behavior: Behavior normal.         Thought Content: Thought content normal.       Significant Labs: All pertinent labs within the past 24 hours have been  reviewed.  CBC:   Recent Labs   Lab 10/25/22  0022 10/26/22  0500   WBC 5.80 4.16   HGB 7.1* 7.1*   HCT 24.9* 24.6*    301     CMP:   Recent Labs   Lab 10/25/22  0022 10/26/22  0500   * 135*   K 4.1 3.7    102   CO2 19* 23   GLU 72 89   BUN 26* 28*   CREATININE 0.9 0.9   CALCIUM 8.8 8.3*   PROT 6.6 5.3*   ALBUMIN 2.8* 2.4*   BILITOT 2.6* 3.0*   ALKPHOS 71 64   AST 27 21   ALT 16 13   ANIONGAP 13 10       Significant Imaging:     Imaging Results              X-Ray Chest AP Portable (Final result)  Result time 10/25/22 07:11:58      Final result by Vince Carter MD (10/25/22 07:11:58)                   Impression:      1. Similar marked cardiomegaly.  2. Developing opacity in the right lung base may reflect pneumonia.      Electronically signed by: Vince Carter  Date:    10/25/2022  Time:    07:11               Narrative:    EXAMINATION:  XR CHEST AP PORTABLE    CLINICAL HISTORY:  CHF;    FINDINGS:  Comparison is made to September 27, 2022.    The mediastinal silhouette is enlarged, similar to previous.  There is new opacity in the right lung base, the remainder of the right lung is clear.  Trace left pleural effusion is suspected.  No pneumothorax.  No acute osseous finding.                        ED Interpretation by Alexander Kauffman Jr., MD (10/25/22 00:54:46, [facility] - Emergency Dept[facility] Emergency Medicine)    Cardiomegaly.  Mild left-sided effusion.  Clear lungs

## 2022-10-26 NOTE — ASSESSMENT & PLAN NOTE
Patient is identified as having Combined Systolic and Diastolic heart failure that is Acute on chronic. CHF is currently uncontrolled due to Continued edema of extremities. Latest ECHO performed and demonstrates- Results for orders placed during the hospital encounter of 09/22/22    Echo    Interpretation Summary  · The left ventricle is severely enlarged with concentric hypertrophy and severely decreased systolic function.  · The estimated ejection fraction is 15%.  · Severe left atrial enlargement.  · Severe right atrial enlargement.  · Grade III left ventricular diastolic dysfunction.  · The estimated PA systolic pressure is 106 mmHg.  · Normal right ventricular size with mildly reduced right ventricular systolic function.  · There is pulmonary hypertension.  · Elevated central venous pressure (15 mmHg).  · Moderate to severe tricuspid regurgitation.  · There is severe left ventricular global hypokinesis.  · Moderate pulmonic regurgitation.  · Moderate mitral regurgitation.  · There is a small right pleural effusion.  . Continue Beta Blocker and monitor clinical status closely. Monitor on telemetry. Patient is off CHF pathway.  Monitor strict Is&Os and daily weights.  Place on fluid restriction of 1.5 L. Continue to stress to patient importance of self efficacy and  on diet for CHF. Last BNP reviewed- and noted below   Recent Labs   Lab 10/25/22  0022   BNP 3,458*   .  Add low dose entresto qhs  Continue with iv lasix     10.26.2022  Lower lasix to 20 mg iv , daily   cw entresto qhs , toprol 25 mg daily   Anticipate dc in am

## 2022-10-26 NOTE — ASSESSMENT & PLAN NOTE
Appears at baseline   Iron studies   May benefit from PRBC after diuresis       10/25  HGB 7.1  Iron studies in process  Monitor, transfuse PRBC as needed       10/26  HGB 7.1  Iron level low,   Will give Venofer 200 mg daily X 3 doses

## 2022-10-27 VITALS
OXYGEN SATURATION: 99 % | HEIGHT: 64 IN | DIASTOLIC BLOOD PRESSURE: 59 MMHG | RESPIRATION RATE: 20 BRPM | WEIGHT: 132.94 LBS | HEART RATE: 94 BPM | TEMPERATURE: 98 F | BODY MASS INDEX: 22.7 KG/M2 | SYSTOLIC BLOOD PRESSURE: 90 MMHG

## 2022-10-27 LAB
ANION GAP SERPL CALC-SCNC: 9 MMOL/L (ref 8–16)
BASOPHILS # BLD AUTO: 0.03 K/UL (ref 0–0.2)
BASOPHILS NFR BLD: 0.6 % (ref 0–1.9)
BUN SERPL-MCNC: 13 MG/DL (ref 6–20)
CALCIUM SERPL-MCNC: 8.6 MG/DL (ref 8.7–10.5)
CHLORIDE SERPL-SCNC: 108 MMOL/L (ref 95–110)
CO2 SERPL-SCNC: 22 MMOL/L (ref 23–29)
CREAT SERPL-MCNC: 0.8 MG/DL (ref 0.5–1.4)
DIFFERENTIAL METHOD: ABNORMAL
EOSINOPHIL # BLD AUTO: 0.2 K/UL (ref 0–0.5)
EOSINOPHIL NFR BLD: 3 % (ref 0–8)
ERYTHROCYTE [DISTWIDTH] IN BLOOD BY AUTOMATED COUNT: 24.3 % (ref 11.5–14.5)
EST. GFR  (NO RACE VARIABLE): >60 ML/MIN/1.73 M^2
GLUCOSE SERPL-MCNC: 109 MG/DL (ref 70–110)
HCT VFR BLD AUTO: 26 % (ref 37–48.5)
HGB BLD-MCNC: 7.2 G/DL (ref 12–16)
IMM GRANULOCYTES # BLD AUTO: 0.03 K/UL (ref 0–0.04)
IMM GRANULOCYTES NFR BLD AUTO: 0.6 % (ref 0–0.5)
LYMPHOCYTES # BLD AUTO: 1.4 K/UL (ref 1–4.8)
LYMPHOCYTES NFR BLD: 29.1 % (ref 18–48)
MAGNESIUM SERPL-MCNC: 1.7 MG/DL (ref 1.6–2.6)
MCH RBC QN AUTO: 23.1 PG (ref 27–31)
MCHC RBC AUTO-ENTMCNC: 27.7 G/DL (ref 32–36)
MCV RBC AUTO: 83 FL (ref 82–98)
MONOCYTES # BLD AUTO: 0.4 K/UL (ref 0.3–1)
MONOCYTES NFR BLD: 8.5 % (ref 4–15)
NEUTROPHILS # BLD AUTO: 2.9 K/UL (ref 1.8–7.7)
NEUTROPHILS NFR BLD: 58.2 % (ref 38–73)
NRBC BLD-RTO: 1 /100 WBC
PLATELET # BLD AUTO: 313 K/UL (ref 150–450)
PMV BLD AUTO: 9.7 FL (ref 9.2–12.9)
POTASSIUM SERPL-SCNC: 3.3 MMOL/L (ref 3.5–5.1)
RBC # BLD AUTO: 3.12 M/UL (ref 4–5.4)
SODIUM SERPL-SCNC: 139 MMOL/L (ref 136–145)
WBC # BLD AUTO: 4.92 K/UL (ref 3.9–12.7)

## 2022-10-27 PROCEDURE — 36415 COLL VENOUS BLD VENIPUNCTURE: CPT | Performed by: NURSE PRACTITIONER

## 2022-10-27 PROCEDURE — 83735 ASSAY OF MAGNESIUM: CPT | Performed by: NURSE PRACTITIONER

## 2022-10-27 PROCEDURE — 63600175 PHARM REV CODE 636 W HCPCS: Performed by: INTERNAL MEDICINE

## 2022-10-27 PROCEDURE — 85025 COMPLETE CBC W/AUTO DIFF WBC: CPT | Performed by: NURSE PRACTITIONER

## 2022-10-27 PROCEDURE — G0378 HOSPITAL OBSERVATION PER HR: HCPCS

## 2022-10-27 PROCEDURE — 99214 PR OFFICE/OUTPT VISIT, EST, LEVL IV, 30-39 MIN: ICD-10-PCS | Mod: ,,, | Performed by: INTERNAL MEDICINE

## 2022-10-27 PROCEDURE — 25000003 PHARM REV CODE 250: Performed by: NURSE PRACTITIONER

## 2022-10-27 PROCEDURE — 94761 N-INVAS EAR/PLS OXIMETRY MLT: CPT

## 2022-10-27 PROCEDURE — 99214 OFFICE O/P EST MOD 30 MIN: CPT | Mod: ,,, | Performed by: INTERNAL MEDICINE

## 2022-10-27 PROCEDURE — 96376 TX/PRO/DX INJ SAME DRUG ADON: CPT

## 2022-10-27 PROCEDURE — 80048 BASIC METABOLIC PNL TOTAL CA: CPT | Performed by: NURSE PRACTITIONER

## 2022-10-27 RX ORDER — MIDODRINE HYDROCHLORIDE 5 MG/1
5 TABLET ORAL
Status: DISCONTINUED | OUTPATIENT
Start: 2022-10-27 | End: 2022-10-27

## 2022-10-27 RX ORDER — METOPROLOL SUCCINATE 25 MG/1
12.5 TABLET, EXTENDED RELEASE ORAL NIGHTLY
Qty: 15 TABLET | Refills: 0 | Status: SHIPPED | OUTPATIENT
Start: 2022-10-27 | End: 2023-10-27

## 2022-10-27 RX ORDER — MIDODRINE HYDROCHLORIDE 5 MG/1
5 TABLET ORAL
Qty: 90 TABLET | Refills: 0 | Status: SHIPPED | OUTPATIENT
Start: 2022-10-27 | End: 2022-10-27 | Stop reason: HOSPADM

## 2022-10-27 RX ADMIN — FOLIC ACID 1 MG: 1 TABLET ORAL at 09:10

## 2022-10-27 RX ADMIN — MIDODRINE HYDROCHLORIDE 5 MG: 5 TABLET ORAL at 09:10

## 2022-10-27 RX ADMIN — ASPIRIN 81 MG CHEWABLE TABLET 81 MG: 81 TABLET CHEWABLE at 09:10

## 2022-10-27 RX ADMIN — APIXABAN 2.5 MG: 2.5 TABLET, FILM COATED ORAL at 09:10

## 2022-10-27 RX ADMIN — IRON SUCROSE 200 MG: 20 INJECTION, SOLUTION INTRAVENOUS at 09:10

## 2022-10-27 RX ADMIN — ATORVASTATIN CALCIUM 40 MG: 40 TABLET, FILM COATED ORAL at 09:10

## 2022-10-27 NOTE — PLAN OF CARE
Went over discharge instructions with patient.   Stressed importance of making and keeping all follow ups as well as making prescribed medication changes.   Too early for metoprolol refill per pharmacy. No new medications ordered to bring to bedside.  Patient verbalized understanding and has had all questions in regards to discharge answered to satisfaction.  IV removed without complications.  Telemetry box removed and returned to monitor tech.  Patient transported via wheelchair to transportation set up by charge nurse at main entrance.

## 2022-10-27 NOTE — SUBJECTIVE & OBJECTIVE
Review of Systems   Constitutional: Positive for malaise/fatigue.   HENT: Negative.     Eyes: Negative.    Cardiovascular:  Positive for dyspnea on exertion (improved).   Respiratory:  Positive for shortness of breath (improved).    Endocrine: Negative.    Hematologic/Lymphatic: Negative.    Skin: Negative.    Musculoskeletal: Negative.    Gastrointestinal: Negative.    Genitourinary: Negative.    Neurological: Negative.    Psychiatric/Behavioral: Negative.     Allergic/Immunologic: Negative.    Objective:     Vital Signs (Most Recent):  Temp: 97.5 °F (36.4 °C) (10/27/22 1143)  Pulse: 94 (10/27/22 1143)  Resp: 20 (10/27/22 1143)  BP: (!) 90/59 (10/27/22 1143)  SpO2: 99 % (10/27/22 1200)   Vital Signs (24h Range):  Temp:  [97.5 °F (36.4 °C)-98.1 °F (36.7 °C)] 97.5 °F (36.4 °C)  Pulse:  [] 94  Resp:  [14-20] 20  SpO2:  [92 %-100 %] 99 %  BP: ()/(50-69) 90/59     Weight: 60.3 kg (132 lb 15 oz)  Body mass index is 22.82 kg/m².     SpO2: 99 %  O2 Device (Oxygen Therapy): room air      Intake/Output Summary (Last 24 hours) at 10/27/2022 1211  Last data filed at 10/27/2022 1200  Gross per 24 hour   Intake 480 ml   Output 2000 ml   Net -1520 ml       Lines/Drains/Airways       Peripheral Intravenous Line  Duration                  Peripheral IV - Single Lumen 10/25/22 0127 20 G Right Forearm 2 days                    Physical Exam  Vitals and nursing note reviewed.   Constitutional:       General: She is not in acute distress.     Appearance: She is well-developed. She is ill-appearing. She is not diaphoretic.      Comments: On supplemental O2   HENT:      Head: Normocephalic and atraumatic.   Eyes:      General:         Right eye: No discharge.         Left eye: No discharge.      Pupils: Pupils are equal, round, and reactive to light.   Neck:      Thyroid: No thyromegaly.      Vascular: No JVD.      Trachea: No tracheal deviation.   Cardiovascular:      Rate and Rhythm: Normal rate and regular rhythm.       Pulses: Intact distal pulses.      Heart sounds: Normal heart sounds, S1 normal and S2 normal. No murmur heard.  Pulmonary:      Effort: Pulmonary effort is normal. No respiratory distress.      Breath sounds: Normal breath sounds. No wheezing or rales.   Abdominal:      General: There is no distension.      Tenderness: There is no rebound.   Musculoskeletal:      Cervical back: Neck supple.      Right lower leg: No edema.      Left lower leg: No edema.   Skin:     General: Skin is warm and dry.      Findings: No erythema.   Neurological:      Mental Status: She is alert and oriented to person, place, and time.   Psychiatric:         Mood and Affect: Mood normal.         Behavior: Behavior normal.         Thought Content: Thought content normal.       Significant Labs: CMP   Recent Labs   Lab 10/26/22  0500 10/27/22  0944   * 139   K 3.7 3.3*    108   CO2 23 22*   GLU 89 109   BUN 28* 13   CREATININE 0.9 0.8   CALCIUM 8.3* 8.6*   PROT 5.3*  --    ALBUMIN 2.4*  --    BILITOT 3.0*  --    ALKPHOS 64  --    AST 21  --    ALT 13  --    ANIONGAP 10 9   , CBC   Recent Labs   Lab 10/26/22  0500 10/27/22  0944   WBC 4.16 4.92   HGB 7.1* 7.2*   HCT 24.6* 26.0*    313   , INR No results for input(s): INR, PROTIME in the last 48 hours., Troponin   Recent Labs   Lab 10/25/22  1813   TROPONINI 0.032*   , and All pertinent lab results from the last 24 hours have been reviewed.    Significant Imaging: Echocardiogram: Transthoracic echo (TTE) complete (Cupid Only):   Results for orders placed or performed during the hospital encounter of 09/22/22   Echo   Result Value Ref Range    BSA 1.6 m2    TDI SEPTAL 0.04 m/s    LV LATERAL E/E' RATIO 14.57 m/s    LV SEPTAL E/E' RATIO 25.50 m/s    LA WIDTH 4.80 cm    IVC diameter 2.77 cm    Left Ventricular Outflow Tract Mean Velocity 0.24 cm/s    Left Ventricular Outflow Tract Mean Gradient 0.25 mmHg    TDI LATERAL 0.07 m/s    LVIDd 6.41 (A) 3.5 - 6.0 cm    IVS 1.34 (A) 0.6 -  1.1 cm    Posterior Wall 1.43 (A) 0.6 - 1.1 cm    LVIDs 6.05 (A) 2.1 - 4.0 cm    FS 6 28 - 44 %    LA volume 116.45 cm3    Sinus 2.66 cm    STJ 2.47 cm    Ascending aorta 2.54 cm    LV mass 425.12 g    LA size 4.46 cm    TAPSE 1.10 cm    Left Ventricle Relative Wall Thickness 0.45 cm    AV mean gradient 1 mmHg    AV valve area 1.05 cm2    AV Velocity Ratio 0.41     AV index (prosthetic) 0.35     MV valve area p 1/2 method 4.37 cm2    E/A ratio 3.52     Mean e' 0.06 m/s    E wave deceleration time 173.52 msec    IVRT 51.38 msec    LVOT diameter 1.95 cm    LVOT area 3.0 cm2    LVOT peak marcus 0.32 m/s    LVOT peak VTI 3.60 cm    Ao peak marcus 0.78 m/s    Ao VTI 10.2 cm    Mr max marcus 4.80 m/s    LVOT stroke volume 10.75 cm3    AV peak gradient 2 mmHg    E/E' ratio 18.55 m/s    MV Peak E Marcus 1.02 m/s    TR Max Marcus 4.77 m/s    MV stenosis pressure 1/2 time 50.32 ms    MV Peak A Marcus 0.29 m/s    LV Systolic Volume 183.41 mL    LV Systolic Volume Index 114.6 mL/m2    LV Diastolic Volume 209.62 mL    LV Diastolic Volume Index 131.01 mL/m2    LA Volume Index 72.8 mL/m2    LV Mass Index 266 g/m2    RA Major Axis 6.43 cm    Left Atrium Minor Axis 6.44 cm    Left Atrium Major Axis 6.36 cm    Triscuspid Valve Regurgitation Peak Gradient 91 mmHg    RA Width 5.22 cm    Right Atrial Pressure (from IVC) 15 mmHg    EF 15 %    TV rest pulmonary artery pressure 106 mmHg    Narrative    · The left ventricle is severely enlarged with concentric hypertrophy and   severely decreased systolic function.  · The estimated ejection fraction is 15%.  · Severe left atrial enlargement.  · Severe right atrial enlargement.  · Grade III left ventricular diastolic dysfunction.  · The estimated PA systolic pressure is 106 mmHg.  · Normal right ventricular size with mildly reduced right ventricular   systolic function.  · There is pulmonary hypertension.  · Elevated central venous pressure (15 mmHg).  · Moderate to severe tricuspid regurgitation.  · There is  severe left ventricular global hypokinesis.  · Moderate pulmonic regurgitation.  · Moderate mitral regurgitation.  · There is a small right pleural effusion.      , EKG: Reviewed, and X-Ray: CXR: X-Ray Chest 1 View (CXR): No results found for this visit on 10/24/22. and X-Ray Chest PA and Lateral (CXR): No results found for this visit on 10/24/22.

## 2022-10-27 NOTE — PROGRESS NOTES
"  [facility] - Telemetry (Hospital)  Adult Nutrition  Consult Note    SUMMARY     Recommendations    Recommendation/Intervention:   1. Recommend diet advance to [facility] (Low Na/Chol) diet, 1500 mL fluid restriction   2. Recommend boost plus BID to fill any nutritional gaps    Goals:   1. 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 Pt reports having a good appetite but comes and goes, stated 0% PO intake today d/t pt not feeling well c/o gas pain, pt not experiencing any N/V/D, denies chewing/swallowing difficulties.Pt receptive to boost oral nutrtion supplements to fill nutritional gaps when not feeling well enough to consume PO intake, NFPE not appropriate at this time. Provided pt with "Fluid Restricted diet and Low Sodium Nutrition Therapy" nutrition education from [facility]. Pt expressed understanding, did not have any questions, provided RD information for further questions. Current diet order includes diabetic, confirmed with RN via secure chat that pt reported to never been diagnosed with diabetes. Skin WDL. Lukasz score 23. No edema. Charted gastrointestional "WDL; except, appearance/characteristics". Pt stated wt 136 lbs PTA, charted weight 9/30/22 115 lbs, 21 lb wt gain in < 1 month. Re-weight warranted for accuracy. LBM 10/24. RD " "will continue to monitor.  Nutrition Discharge Planning: Cardiac diet    Follow up:  10/27: Visited pt at bedside, pt alert. Pt reported feeling better, appetite improved, confirmed 75% PO intake, not experiencing any N/V/D. Pt stated prefers strawberry, chocolate boost, still receptive to consume. NFPE preformed, noted mild malnutrition, fingers almost touch when pinching tricep, 1 + trace LE edema. Pt stomach very hard and round, pt denies any gas pains, stated does feel pressure when she sits up. LBM 10/26. Chart reviewed: Skin WDL, GI WDL, except appearance, rounded; distended. Labs, meds, weight reviewed. K (L), Calcium (L). RD will continue to monitor.   Nutrition Risk Screen    Nutrition Risk Screen: no indicators present    Nutrition/Diet History    Food Allergies: NKFA  Factors Affecting Nutritional Intake: abdominal distension, decreased appetite    Anthropometrics    Temp: 97.5 °F (36.4 °C)  Height Method: Stated  Height: 5' 4" (162.6 cm)  Height (inches): 64 in  Weight Method: Bed Scale  Weight: 60.3 kg (132 lb 15 oz)  Weight (lb): 132.94 lb  Ideal Body Weight (IBW), Female: 120 lb  % Ideal Body Weight, Female (lb): 108.76 %  BMI (Calculated): 22.8  BMI Grade: 18.5-24.9 - normal     Wt Readings from Last 30 Encounters:   10/26/22 60.3 kg (132 lb 15 oz)   10/11/22 56.8 kg (125 lb 3.5 oz)   10/07/22 55.1 kg (121 lb 7.6 oz)   09/30/22 52.3 kg (115 lb 4.8 oz)     Lab/Procedures/Meds    Pertinent Labs Reviewed: reviewed  Pertinent Medications Reviewed: reviewed  BMP  Lab Results   Component Value Date     10/27/2022    K 3.3 (L) 10/27/2022     10/27/2022    CO2 22 (L) 10/27/2022    BUN 13 10/27/2022    CREATININE 0.8 10/27/2022    CALCIUM 8.6 (L) 10/27/2022    ANIONGAP 9 10/27/2022    Scheduled Meds:   apixaban  2.5 mg Oral BID    aspirin  81 mg Oral Daily    atorvastatin  40 mg Oral Daily    folic acid  1 mg Oral Daily    iron sucrose  200 mg Intravenous Daily    metoprolol succinate  12.5 mg " Oral Nightly    polyethylene glycol  17 g Oral Daily    QUEtiapine  100 mg Oral QHS    senna-docusate 8.6-50 mg  1 tablet Oral BID     Continuous Infusions:  PRN Meds:.hydrOXYzine pamoate, ondansetron, oxyCODONE-acetaminophen, sodium chloride 0.9%    Physical Findings/Assessment         Estimated/Assessed Needs    Weight Used For Calorie Calculations: 59.2 kg (130 lb 8.2 oz)  Energy Calorie Requirements (kcal): 9951-7752 (22-24kcal/kg ABW (CHF))  Energy Need Method: Kcal/kg  Protein Requirements: 65-83 (1.1-1.4 g/kg (CHF))  Weight Used For Protein Calculations: 59.2 kg (130 lb 8.2 oz)  Fluid Requirements (mL): 1500 (Fluid restriction)     RDA Method (mL): 1302  CHO Requirement: 162-177 (5261-4254 kcal/8)      Nutrition Prescription Ordered    Current Diet Order: Diabetic/Cardic diet, 1500 mL fluid restriction    Evaluation of Received Nutrient/Fluid Intake  I/O: (Net since admit)  10/25: -1 mL   10/27: -1621 mL  Energy Calories Required: not meeting needs  Protein Required: not meeting needs  Fluid Required: meeting needs  % Intake of Estimated Energy Needs: %  % Meal Intake: %    Nutrition Risk    Level of Risk/Frequency of Follow-up: Low (F/u 1 x weekly)       Monitor and Evaluation    Food and Nutrient Intake: food and beverage intake  Food and Nutrient Adminstration: diet order  Knowledge/Beliefs/Attitudes: food and nutrition knowledge/skill, beliefs and attitudes  Anthropometric Measurements: weight, weight change, body mass index  Biochemical Data, Medical Tests and Procedures: electrolyte and renal panel, gastrointestinal profile, glucose/endocrine profile, inflammatory profile, lipid profile  Nutrition-Focused Physical Findings: overall appearance       Nutrition Follow-Up  RD follow-up?: Yes  Todd Rowland   RD Follow-up?: Yes

## 2022-10-27 NOTE — PLAN OF CARE
[facility] - Telemetry (Hospital)  Discharge Final Note    Primary Care Provider: Juan Contreras MD    Expected Discharge Date: 10/27/2022    Final Discharge Note (most recent)       Final Note - 10/27/22 1230          Final Note    Assessment Type Final Discharge Note     Anticipated Discharge Disposition Home or Self Care     Hospital Resources/Appts/Education Provided Appointments scheduled by Navigator/Coordinator;Appointments scheduled and added to AVS        Post-Acute Status    Discharge Delays None known at this time                     Important Message from Medicare             Contact Info       HENRRY Echols   Specialty: Transplant    [address] [place] [id number]   Phone: [phone]       Next Steps: Follow up on 11/1/2022    Instructions: Hospital follow for Congestive Heart Failure          Follow up with Tiffanie Clemons PA-C  Tuesday Nov 1, 2022 10:00AM  Hospital follow for Congestive Heart Failure

## 2022-10-27 NOTE — PLAN OF CARE
Aox4. Able to verbalize needs & follow commands. Calm & cooperative throughout shift.   POC reviewed with pt. Interventions implemented as appropriate.    VSS; SR-ST on tele-monitor.  On room air. Has 2L NC for comfort.    PIV patent. Integrity maintained.    Skin WDI. No new skin issues.    No c/o pain.   Continent of b/b. Up to toilet.    Eliquis for VTE.  Ambulates independently.  Activity ad berta. Frequent position changes encouraged. Able to reposition in bed independently.  Educated on s/sx of pressure injury;  verbalized understanding.  NADN. Resting quietly in bed.   Free of falls. Hourly rounding complete.   All safety measures remain in place. SR up x2; bed low & locked. Call light w/in reach.   Will continue to monitor throughout shift.

## 2022-10-27 NOTE — PLAN OF CARE
Nutrition Recommendations 10/27:  1. Recommend diet advance to Cardic (Low Na/Chol) diet, 1500 mL fluid restriction  2. Recommend boost plus BID to fill any nutritional gaps  3. Collaboration of care with medical providers  Todd Rowland

## 2022-10-27 NOTE — ASSESSMENT & PLAN NOTE
Patient is identified as having Combined Systolic and Diastolic heart failure that is Acute on chronic. CHF is currently uncontrolled due to Continued edema of extremities. Latest ECHO performed and demonstrates- Results for orders placed during the hospital encounter of 09/22/22    Echo    Interpretation Summary  · The left ventricle is severely enlarged with concentric hypertrophy and severely decreased systolic function.  · The estimated ejection fraction is 15%.  · Severe left atrial enlargement.  · Severe right atrial enlargement.  · Grade III left ventricular diastolic dysfunction.  · The estimated PA systolic pressure is 106 mmHg.  · Normal right ventricular size with mildly reduced right ventricular systolic function.  · There is pulmonary hypertension.  · Elevated central venous pressure (15 mmHg).  · Moderate to severe tricuspid regurgitation.  · There is severe left ventricular global hypokinesis.  · Moderate pulmonic regurgitation.  · Moderate mitral regurgitation.  · There is a small right pleural effusion.  . Continue Beta Blocker and monitor clinical status closely. Monitor on telemetry. Patient is off CHF pathway.  Monitor strict Is&Os and daily weights.  Place on fluid restriction of 1.5 L. Continue to stress to patient importance of self efficacy and  on diet for CHF. Last BNP reviewed- and noted below   Recent Labs   Lab 10/25/22  0022   BNP 3,458*   .  Add low dose entresto qhs  Continue with iv lasix     10.26.2022  Lower lasix to 20 mg iv , daily   cw entresto qhs , toprol 25 mg daily   Anticipate dc in am     10/27/22  -Volume status improved  -BP unable to tolerate Entresto  -Continue Toprol XL, po Lasix, can consider 1/2 tab entresto nightly at OP  -Hypotension limiting GDMT  -Follow-up in CHF clinic

## 2022-10-27 NOTE — PROGRESS NOTES
[facility] ([facility])  Cardiology  Progress Note    Patient Name: Teresa Echols  MRN: [id number]  Admission Date: 10/24/2022  Hospital Length of Stay: 0 days  Code Status: Full Code   Attending Physician: Edson Morales MD   Primary Care Physician: Juan Contreras MD  Expected Discharge Date: 10/27/2022  Principal Problem:Acute on chronic systolic congestive heart failure    Subjective:   HPI:  37-year-old  female with Systolic CHF EF 15%, chronic cocaine user, right lower extremity DVT s/p thrombectomy, Bipolar d/o who presented to ED with SOB, leg swelling and bloating - onset one week ago. Pt denies chest pain. Denies recent cocaine use.      Ed work up showed sinus tachycardia- no ischemic changes. Hgb 7.1(baseline), BNP 3458, Troponin 0.028. CXR showed Cardiomegaly, Mild left-sided effusion.  Pt was given IV lasix 80mg. 1 liter UOP noted      Denies any recent cocaine use , no chest pain   States mild improvement since lasix, complained of bloated stomach     Hospital Course:   10/26/22-Patient seen and examined today, resting in bed. Feeling better. Less SOB. Still weak and CROW   Hypotensive this am , did take entresto at night yesterday   Lasix held this morning   Still has jatinder LE edema     10/27/22-Patient seen and examined today, resting in bed. SOB and LE edema improved. Feels nearly back to her baseline. Diuresed well overnight. BP remains tenuous, Entresto d/c'd, can consider 1/2 tab nightly as OP. Labs reviewed, K low being repleted. H/H at baseline.           Review of Systems   Constitutional: Positive for malaise/fatigue.   HENT: Negative.     Eyes: Negative.    Cardiovascular:  Positive for dyspnea on exertion (improved).   Respiratory:  Positive for shortness of breath (improved).    Endocrine: Negative.    Hematologic/Lymphatic: Negative.    Skin: Negative.    Musculoskeletal: Negative.    Gastrointestinal: Negative.    Genitourinary: Negative.    Neurological: Negative.     Psychiatric/Behavioral: Negative.     Allergic/Immunologic: Negative.    Objective:     Vital Signs (Most Recent):  Temp: 97.5 °F (36.4 °C) (10/27/22 1143)  Pulse: 94 (10/27/22 1143)  Resp: 20 (10/27/22 1143)  BP: (!) 90/59 (10/27/22 1143)  SpO2: 99 % (10/27/22 1200)   Vital Signs (24h Range):  Temp:  [97.5 °F (36.4 °C)-98.1 °F (36.7 °C)] 97.5 °F (36.4 °C)  Pulse:  [] 94  Resp:  [14-20] 20  SpO2:  [92 %-100 %] 99 %  BP: ()/(50-69) 90/59     Weight: 60.3 kg (132 lb 15 oz)  Body mass index is 22.82 kg/m².     SpO2: 99 %  O2 Device (Oxygen Therapy): room air      Intake/Output Summary (Last 24 hours) at 10/27/2022 1211  Last data filed at 10/27/2022 1200  Gross per 24 hour   Intake 480 ml   Output 2000 ml   Net -1520 ml       Lines/Drains/Airways       Peripheral Intravenous Line  Duration                  Peripheral IV - Single Lumen 10/25/22 0127 20 G Right Forearm 2 days                    Physical Exam  Vitals and nursing note reviewed.   Constitutional:       General: She is not in acute distress.     Appearance: She is well-developed. She is ill-appearing. She is not diaphoretic.      Comments: On supplemental O2   HENT:      Head: Normocephalic and atraumatic.   Eyes:      General:         Right eye: No discharge.         Left eye: No discharge.      Pupils: Pupils are equal, round, and reactive to light.   Neck:      Thyroid: No thyromegaly.      Vascular: No JVD.      Trachea: No tracheal deviation.   Cardiovascular:      Rate and Rhythm: Normal rate and regular rhythm.      Pulses: Intact distal pulses.      Heart sounds: Normal heart sounds, S1 normal and S2 normal. No murmur heard.  Pulmonary:      Effort: Pulmonary effort is normal. No respiratory distress.      Breath sounds: Normal breath sounds. No wheezing or rales.   Abdominal:      General: There is no distension.      Tenderness: There is no rebound.   Musculoskeletal:      Cervical back: Neck supple.      Right lower leg: No edema.       Left lower leg: No edema.   Skin:     General: Skin is warm and dry.      Findings: No erythema.   Neurological:      Mental Status: She is alert and oriented to person, place, and time.   Psychiatric:         Mood and Affect: Mood normal.         Behavior: Behavior normal.         Thought Content: Thought content normal.       Significant Labs: CMP   Recent Labs   Lab 10/26/22  0500 10/27/22  0944   * 139   K 3.7 3.3*    108   CO2 23 22*   GLU 89 109   BUN 28* 13   CREATININE 0.9 0.8   CALCIUM 8.3* 8.6*   PROT 5.3*  --    ALBUMIN 2.4*  --    BILITOT 3.0*  --    ALKPHOS 64  --    AST 21  --    ALT 13  --    ANIONGAP 10 9   , CBC   Recent Labs   Lab 10/26/22  0500 10/27/22  0944   WBC 4.16 4.92   HGB 7.1* 7.2*   HCT 24.6* 26.0*    313   , INR No results for input(s): INR, PROTIME in the last 48 hours., Troponin   Recent Labs   Lab 10/25/22  1813   TROPONINI 0.032*   , and All pertinent lab results from the last 24 hours have been reviewed.    Significant Imaging: Echocardiogram: Transthoracic echo (TTE) complete (Cupid Only):   Results for orders placed or performed during the hospital encounter of 09/22/22   Echo   Result Value Ref Range    BSA 1.6 m2    TDI SEPTAL 0.04 m/s    LV LATERAL E/E' RATIO 14.57 m/s    LV SEPTAL E/E' RATIO 25.50 m/s    LA WIDTH 4.80 cm    IVC diameter 2.77 cm    Left Ventricular Outflow Tract Mean Velocity 0.24 cm/s    Left Ventricular Outflow Tract Mean Gradient 0.25 mmHg    TDI LATERAL 0.07 m/s    LVIDd 6.41 (A) 3.5 - 6.0 cm    IVS 1.34 (A) 0.6 - 1.1 cm    Posterior Wall 1.43 (A) 0.6 - 1.1 cm    LVIDs 6.05 (A) 2.1 - 4.0 cm    FS 6 28 - 44 %    LA volume 116.45 cm3    Sinus 2.66 cm    STJ 2.47 cm    Ascending aorta 2.54 cm    LV mass 425.12 g    LA size 4.46 cm    TAPSE 1.10 cm    Left Ventricle Relative Wall Thickness 0.45 cm    AV mean gradient 1 mmHg    AV valve area 1.05 cm2    AV Velocity Ratio 0.41     AV index (prosthetic) 0.35     MV valve area p 1/2 method  4.37 cm2    E/A ratio 3.52     Mean e' 0.06 m/s    E wave deceleration time 173.52 msec    IVRT 51.38 msec    LVOT diameter 1.95 cm    LVOT area 3.0 cm2    LVOT peak marcus 0.32 m/s    LVOT peak VTI 3.60 cm    Ao peak marcus 0.78 m/s    Ao VTI 10.2 cm    Mr max marcus 4.80 m/s    LVOT stroke volume 10.75 cm3    AV peak gradient 2 mmHg    E/E' ratio 18.55 m/s    MV Peak E Marcus 1.02 m/s    TR Max Marcus 4.77 m/s    MV stenosis pressure 1/2 time 50.32 ms    MV Peak A Marcus 0.29 m/s    LV Systolic Volume 183.41 mL    LV Systolic Volume Index 114.6 mL/m2    LV Diastolic Volume 209.62 mL    LV Diastolic Volume Index 131.01 mL/m2    LA Volume Index 72.8 mL/m2    LV Mass Index 266 g/m2    RA Major Axis 6.43 cm    Left Atrium Minor Axis 6.44 cm    Left Atrium Major Axis 6.36 cm    Triscuspid Valve Regurgitation Peak Gradient 91 mmHg    RA Width 5.22 cm    Right Atrial Pressure (from IVC) 15 mmHg    EF 15 %    TV rest pulmonary artery pressure 106 mmHg    Narrative    · The left ventricle is severely enlarged with concentric hypertrophy and   severely decreased systolic function.  · The estimated ejection fraction is 15%.  · Severe left atrial enlargement.  · Severe right atrial enlargement.  · Grade III left ventricular diastolic dysfunction.  · The estimated PA systolic pressure is 106 mmHg.  · Normal right ventricular size with mildly reduced right ventricular   systolic function.  · There is pulmonary hypertension.  · Elevated central venous pressure (15 mmHg).  · Moderate to severe tricuspid regurgitation.  · There is severe left ventricular global hypokinesis.  · Moderate pulmonic regurgitation.  · Moderate mitral regurgitation.  · There is a small right pleural effusion.      , EKG: Reviewed, and X-Ray: CXR: X-Ray Chest 1 View (CXR): No results found for this visit on 10/24/22. and X-Ray Chest PA and Lateral (CXR): No results found for this visit on 10/24/22.    Assessment and Plan:   Patient who presents with decompensated combined  CHF. Volumes status improved. Hypotension limits titration of GDMT. Follow-up in CHF clinic.    * Acute on chronic systolic congestive heart failure  Patient is identified as having Combined Systolic and Diastolic heart failure that is Acute on chronic. CHF is currently uncontrolled due to Continued edema of extremities. Latest ECHO performed and demonstrates- Results for orders placed during the hospital encounter of 09/22/22    Echo    Interpretation Summary  · The left ventricle is severely enlarged with concentric hypertrophy and severely decreased systolic function.  · The estimated ejection fraction is 15%.  · Severe left atrial enlargement.  · Severe right atrial enlargement.  · Grade III left ventricular diastolic dysfunction.  · The estimated PA systolic pressure is 106 mmHg.  · Normal right ventricular size with mildly reduced right ventricular systolic function.  · There is pulmonary hypertension.  · Elevated central venous pressure (15 mmHg).  · Moderate to severe tricuspid regurgitation.  · There is severe left ventricular global hypokinesis.  · Moderate pulmonic regurgitation.  · Moderate mitral regurgitation.  · There is a small right pleural effusion.  . Continue Beta Blocker and monitor clinical status closely. Monitor on telemetry. Patient is off CHF pathway.  Monitor strict Is&Os and daily weights.  Place on fluid restriction of 1.5 L. Continue to stress to patient importance of self efficacy and  on diet for CHF. Last BNP reviewed- and noted below   Recent Labs   Lab 10/25/22  0022   BNP 3,458*   .  Add low dose entresto qhs  Continue with iv lasix     10.26.2022  Lower lasix to 20 mg iv , daily   cw entresto qhs , toprol 25 mg daily   Anticipate dc in am     10/27/22  -Volume status improved  -BP unable to tolerate Entresto  -Continue Toprol XL, po Lasix, can consider 1/2 tab entresto nightly at OP  -Hypotension limiting GDMT  -Follow-up in CHF clinic    Elevated troponin  Secondary to  ADHF    Tobacco use  -Smoking cessation advised    Normocytic anemia  -H/H at baseline  -Transfuse prn    Cocaine abuse  Denies any recent use   Check UDS        VTE Risk Mitigation (From admission, onward)         Ordered     apixaban tablet 2.5 mg  2 times daily         10/25/22 0418     IP VTE HIGH RISK PATIENT  Once         10/25/22 0418     Place sequential compression device  Until discontinued         10/25/22 0418                Zoe Kim PA-C  Cardiology  [facility] ([facility])

## 2022-10-28 NOTE — DISCHARGE SUMMARY
[facility] Medicine  Discharge Summary      Patient Name: Teresa Echols  MRN: [id number]  Patient Class: OP- Observation  Admission Date: 10/24/2022  Hospital Length of Stay: 0 days  Discharge Date and Time: 10/27/2022  2:26 PM  Attending Physician: Edson Morales MD  Discharging Provider: Paul Gray NP  Primary Care Provider: Juan Contreras MD      HPI:   37-year-old  female with Systolic CHF EF 15%, chronic cocaine user, right lower extremity DVT s/p thrombectomy, Bipolar d/o who presented to ED with SOB, leg swelling and bloating - onset one week ago. Pt denies chest pain. Denies recent cocaine use.     Ed work up showed sinus tachycardia- no ischemic changes. Hgb 7.1(baseline), BNP 3458, Troponin 0.028. CXR showed Cardiomegaly, Mild left-sided effusion.  Pt was given IV lasix 80mg. 1 liter UOP noted       The patient will be placed in observation for CHF exacerbation       * No surgery found *      Hospital Course:   Ms Echols is a 37 year old female who presented to [facility] for evaluation of shortness of breath. Associated symptoms include bilateral leg swelling and left side abdominal bloating. Denies associated symoptom of chest pain, fever, chills, nausea or vomiting. BNP elevated at 3458 on admission. Cardiology consulted for further evaluation. As of 10/26/2022, patient repots shortness of breath and orthopnea. She also noted to have some hypotension this morning. Cardiology following. As of 10/27/2022, patient feeling well, blood pressure on the low side but stable. Case reviewed with Cardiology, ok to discharge for from their standpoint. Will have close follow up in CHF Clinic. Iron level low, may be contributing to anemia. Venofer IV daily X 2 given. She was seen, examined and deemed stable for discharge.        Goals of Care Treatment Preferences:  Code Status: Full Code      Consults:   Consults (From admission, onward)        Status Ordering  Provider     Inpatient consult to Cardiology  Once        Provider:  Arnaldo Lundberg MD    Completed SANA WHITTINGTON     Inpatient consult to Social Work/Case Management  Once        Provider:  (Not yet assigned)    Completed REGINA GRANADO     Inpatient consult to Registered Dietitian/Nutritionist  Once        Provider:  (Not yet assigned)    Completed REGINA GRANADO          No new Assessment & Plan notes have been filed under this hospital service since the last note was generated.  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    Specialty: Transplant  Why: Hospital follow for Congestive Heart Failure  Contact information:  [address] [place] [address] [id number] [phone]                       Patient Instructions:      Ambulatory referral/consult to Congestive Heart Failure Clinic   Standing Status: Future   Referral Priority: Routine Referral Type: Consultation   Referral Reason: Specialty Services Required   Requested Specialty: Cardiology   Number of Visits Requested: 1     Diet Adult Regular     Order Specific Question Answer Comments   Na restriction, if any: 2gNa    Fluid restriction: Fluid - 1500mL        Significant Diagnostic Studies: Labs:   CMP   Recent Labs   Lab 10/27/22  0944      K 3.3*      CO2 22*      BUN 13   CREATININE 0.8   CALCIUM 8.6*   ANIONGAP 9    and CBC   Recent Labs   Lab  10/27/22  0944   WBC 4.92   HGB 7.2*   HCT 26.0*          Pending Diagnostic Studies:     None         Medications:  Reconciled Home Medications:      Medication List      CHANGE how you take these medications    metoprolol succinate 25 MG 24 hr tablet  Commonly known as: TOPROL-XL  Take 0.5 tablets (12.5 mg total) by mouth nightly.  What changed: how much to take        CONTINUE taking these medications    acetaminophen 325 MG tablet  Commonly known as: TYLENOL  Take 2 tablets (650 mg total) by mouth every 6 (six) hours as needed for Pain.     ascorbic acid (vitamin C) 500 MG tablet  Commonly known as: VITAMIN C  Take 1 tablet (500 mg total) by mouth every evening.     atorvastatin 40 MG tablet  Commonly known as: LIPITOR  Take 40 mg by mouth once daily.     benzonatate 200 MG capsule  Commonly known as: TESSALON  Take by mouth.     ELIQUIS 2.5 mg Tab  Generic drug: apixaban  Take 1 tablet (2.5 mg total) by mouth 2 (two) times daily.     ferrous sulfate 324 mg (65 mg iron) Tbec  Take 1 tablet (324 mg total) by mouth 2 (two) times daily with meals.     folic acid 1 MG tablet  Commonly known as: FOLVITE  Take 1 tablet (1 mg total) by mouth once daily.     furosemide 40 MG tablet  Commonly known as: LASIX  Take 40 mg by mouth once daily.     JARDIANCE 10 mg tablet  Generic drug: empagliflozin  Take 10 mg by mouth once daily.     multivitamin Tab  Take 1 tablet by mouth once daily.     potassium chloride SA 10 MEQ tablet  Commonly known as: K-DUR,KLOR-CON M  Take 1 tablet by mouth once daily.     QUEtiapine 100 MG Tab  Commonly known as: SEROQUEL  Take 1 tablet (100 mg total) by mouth every evening.            Indwelling Lines/Drains at time of discharge:   Lines/Drains/Airways     None                 Time spent on the discharge of patient: 45 minutes         Paul Gray NP  Department of Hospital Medicine  [facility] ([facility])

## 2022-10-31 ENCOUNTER — TELEPHONE (OUTPATIENT)
Dept: CARDIOLOGY | Facility: CLINIC | Age: 37
End: 2022-10-31
Payer: MEDICAID

## 2022-10-31 NOTE — TELEPHONE ENCOUNTER
Called pt, busy signal.   Called Yee, mother who informed would have pt return call and notify pt of f/u appt on 11/1/2022 with HENRRY Huff.

## 2022-11-01 NOTE — TELEPHONE ENCOUNTER
Called pt regarding missed f/u appt today, no answer, and unable to leave VM.    Called Yee, mother, no answer and unable to leave VM.

## 2022-11-18 DIAGNOSIS — R06.02 SOB (SHORTNESS OF BREATH): Primary | ICD-10-CM

## 2022-12-08 NOTE — ASSESSMENT & PLAN NOTE
Target O2 sat 92-94%.  Multifactorial CHF plus suspected bilateral pneumonia.  IV Rocephin doxycycline.  Check procalcitonin CRP.  Send respiratory culture.  Nasal MRSA.  Blood culture.   Acitretin Pregnancy And Lactation Text: This medication is Pregnancy Category X and should not be given to women who are pregnant or may become pregnant in the future. This medication is excreted in breast milk.

## 2022-12-26 PROBLEM — N39.0 URINARY TRACT INFECTION WITH HEMATURIA: Status: RESOLVED | Noted: 2022-09-24 | Resolved: 2022-12-26

## 2022-12-26 PROBLEM — R31.9 URINARY TRACT INFECTION WITH HEMATURIA: Status: RESOLVED | Noted: 2022-09-24 | Resolved: 2022-12-26

## 2023-03-03 NOTE — H&P
[facility] - Emergency Dept.  [facility] Medicine  History & Physical    Patient Name: Teresa Echols  MRN: [id number]  Patient Class: OP- Observation  Admission Date: 9/22/2022  Attending Physician: Srinivas Beard MD   Primary Care Provider: Primary Doctor No         Patient information was obtained from patient, past medical records and ER records.     Subjective:     Principal Problem:Hemoptysis    Chief Complaint:   Chief Complaint   Patient presents with    Right leg pain     Pt C/O of 10/10 right leg pain. No trauma noted, Hx DVT. Pt has chronic SOB, and she has been coughing.         HPI: Ms. Echols is a 37-year-old  female with PMH significant for systolic CHF (EF 25% on echo done June 2022), chronic cocaine user, right lower extremity DVT status post thrombectomy on in June), presented to [facility] ED complaining of coughing up blood the past 2-3 months, since the thrombectomy procedure.  Patient on Xarelto.  States that she has been to multiple hospitals for the same issue, was told that there is nothing abnormal and was sent home.  Denies chest pain, but complains of SOB, worsen with exertion.  Denies fever, chills.  /68.  Afebrile.  HR .  SaO2 100% on 2 L O2 N/C (not home oxygen dependent).  Laboratory workup reveals hemoglobin 7.3, MCV 81, INR 1.4.  D-dimer elevated 2.65.  CTA chest negative for PE, but reveals enlarged cardiac silhouette, right heart strain, increased vascular congestion.  BNP elevated 3610.  Troponin 0.024.  UDS pending.  Received Lasix 40 mg IV x1 in the ED.    Admitting diagnosis:  Hemoptysis.  Acute on chronic systolic CHF.  Chronic cocaine user.  History of right leg DVT, status post thrombectomy in June 2022.  On Xarelto.      Past Medical History:   Diagnosis Date    Bipolar disorder, unspecified     CHF (congestive heart failure)     Hypertension        History reviewed. No pertinent surgical history.    Review of patient's allergies indicates:  No  Known Allergies    No current facility-administered medications on file prior to encounter.     Current Outpatient Medications on File Prior to Encounter   Medication Sig    apixaban (ELIQUIS) 5 mg Tab Take 5 mg by mouth.    metoprolol succinate (TOPROL-XL) 25 MG 24 hr tablet Take 1 tablet by mouth once daily.    potassium chloride SA (K-DUR,KLOR-CON M) 10 MEQ tablet Take 1 tablet by mouth once daily.    aspirin (ECOTRIN) 81 MG EC tablet Take 81 mg by mouth once daily.    atorvastatin (LIPITOR) 40 MG tablet Take 40 mg by mouth once daily.    benzonatate (TESSALON) 200 MG capsule Take by mouth.    carvediloL (COREG) 6.25 MG tablet Take 6.25 mg by mouth 2 (two) times daily.    ferrous sulfate 324 mg (65 mg iron) TbEC Take by mouth once daily.    furosemide (LASIX) 40 MG tablet Take 40 mg by mouth once daily.    JARDIANCE 10 mg tablet Take 10 mg by mouth once daily.    QUEtiapine (SEROQUEL) 100 MG Tab Take 100 mg by mouth every evening.     Family History       Problem Relation (Age of Onset)    Hypertension Mother, Father          Tobacco Use    Smoking status: Every Day     Types: Cigarettes    Smokeless tobacco: Never   Substance and Sexual Activity    Alcohol use: Yes    Drug use: Yes     Types: Cocaine    Sexual activity: Not on file     Review of Systems   Constitutional:  Positive for fatigue. Negative for chills and fever.   HENT: Negative.  Negative for congestion, rhinorrhea, sore throat and trouble swallowing.    Eyes: Negative.    Respiratory:  Positive for cough (blood) and shortness of breath. Negative for wheezing.    Cardiovascular:  Positive for leg swelling. Negative for chest pain and palpitations.   Gastrointestinal: Negative.  Negative for abdominal pain, diarrhea, nausea and vomiting.   Endocrine: Negative.    Genitourinary: Negative.  Negative for dysuria and flank pain.   Musculoskeletal: Negative.  Negative for back pain.   Skin: Negative.  Negative for rash.    Allergic/Immunologic: Negative.    Neurological: Negative.  Negative for speech difficulty, weakness, numbness and headaches.   Hematological: Negative.    Psychiatric/Behavioral: Negative.  Negative for hallucinations. The patient is not nervous/anxious.    All other systems reviewed and are negative.  Objective:     Vital Signs (Most Recent):  Temp: 98.8 °F (37.1 °C) (09/22/22 2340)  Pulse: (!) 115 (09/22/22 2355)  Resp: 18 (09/23/22 0209)  BP: 129/87 (09/22/22 2355)  SpO2: 100 % (09/22/22 2355)   Vital Signs (24h Range):  Temp:  [98.8 °F (37.1 °C)] 98.8 °F (37.1 °C)  Pulse:  [] 115  Resp:  [18-20] 18  SpO2:  [100 %] 100 %  BP: (122-129)/(68-87) 129/87        There is no height or weight on file to calculate BMI.    Physical Exam  Vitals and nursing note reviewed.   Constitutional:       General: She is awake. She is not in acute distress.     Appearance: She is not ill-appearing.      Interventions: Nasal cannula in place.      Comments: Currently on 2 L O2 N/C   HENT:      Head: Normocephalic and atraumatic.      Mouth/Throat:      Mouth: Mucous membranes are moist.   Eyes:      General: No scleral icterus.     Conjunctiva/sclera: Conjunctivae normal.   Cardiovascular:      Rate and Rhythm: Normal rate and regular rhythm.      Heart sounds: No murmur heard.  Pulmonary:      Effort: Pulmonary effort is normal. No respiratory distress.      Breath sounds: Rales present. No wheezing.   Abdominal:      Palpations: Abdomen is soft.      Tenderness: There is no abdominal tenderness.   Musculoskeletal:         General: Swelling (Trace edema bilateral ankles) present. Normal range of motion.      Cervical back: Normal range of motion and neck supple.   Skin:     General: Skin is warm.   Neurological:      General: No focal deficit present.      Mental Status: She is alert and oriented to person, place, and time. Mental status is at baseline.   Psychiatric:         Attention and Perception: Attention normal.          Mood and Affect: Affect is flat.         Speech: Speech normal.         Behavior: Behavior is cooperative.           Significant Labs: All pertinent labs within the past 24 hours have been reviewed.  BMP:   Recent Labs   Lab 09/23/22 0053   GLU 74      K 4.2      CO2 18*   BUN 16   CREATININE 0.9   CALCIUM 8.6*   MG 1.8     CBC:   Recent Labs   Lab 09/23/22 0053   WBC 5.87   HGB 7.3*   HCT 25.8*        CMP:   Recent Labs   Lab 09/23/22 0053      K 4.2      CO2 18*   GLU 74   BUN 16   CREATININE 0.9   CALCIUM 8.6*   PROT 6.6   ALBUMIN 2.8*   BILITOT 3.0*   ALKPHOS 75   AST 22   ALT 17   ANIONGAP 13     Troponin:   Recent Labs   Lab 09/23/22 0053   TROPONINI 0.024       Significant Imaging: I have reviewed all pertinent imaging results/findings within the past 24 hours.  I have reviewed and interpreted all pertinent imaging results/findings within the past 24 hours.    Imaging Results              CTA Chest Non-Coronary (PE Studies) (In process)                      X-Ray Chest AP Portable (In process)                   Per [facility] radiology, pt's CTA Chest results:      1. No definitive evidence of PE.  2. Moderate cardiomegaly with evidence of right heart strain. Small pericardial effusion, suboptimally evaluated.  3. Mild pulmonary interstitial edema.  4. Consolidations involving both lower lobes. Small right pleural effusion    I have independently reviewed and interpreted the EKG.     I have independently reviewed all pertinent labs within the past 24 hours.    I have independently reviewed, visualized and interpreted all pertinent imaging results within the past 24 hours and discussed the findings with the ED physician, Dr. Zee          Assessment/Plan:     * Hemoptysis  -hold Xarelto  -BP stable  -CTA chest negative for PE or masses    Acute on chronic systolic congestive heart failure  -EF 25% on echo done in June 2022 at [facility]  -repeat echo here  -Lasix 40 mg IV BID x 2  doses, re-evaluate  -CHF pathway      Chronic deep vein thrombosis (DVT) of right lower extremity  -s/p thrombectomy few months ago at [facility]  -Has been on Xarelto  -hold due to hemoptysis      Cocaine abuse  -per Care Everywhere, has been snorting cocaine since age 18  -UDS pending, hold BB until resulted      Tobacco use  -nicotine patch      Non compliance w medication regimen           VTE Risk Mitigation (From admission, onward)         Ordered     Place sequential compression device  Until discontinued         09/23/22 0539     IP VTE HIGH RISK PATIENT  Once         09/23/22 0539                 The patient is placed in OBSERVATION status.      Abel Franklin MD  Department of Hospital Medicine   '[place] - Emergency Dept.   information could not be obtained

## (undated) DEVICE — PACK CATH LAB CUSTOM BR

## (undated) DEVICE — GUIDEWIRE AMPLATZ .035X145 STR

## (undated) DEVICE — OMNIPAQUE 300MG 150ML VIAL